# Patient Record
Sex: MALE | Race: WHITE | NOT HISPANIC OR LATINO | Employment: OTHER | URBAN - NONMETROPOLITAN AREA
[De-identification: names, ages, dates, MRNs, and addresses within clinical notes are randomized per-mention and may not be internally consistent; named-entity substitution may affect disease eponyms.]

---

## 2017-02-16 DIAGNOSIS — M25.50 ARTHRALGIA, UNSPECIFIED JOINT: Primary | ICD-10-CM

## 2017-02-16 RX ORDER — CELECOXIB 200 MG/1
200 CAPSULE ORAL DAILY
Qty: 60 CAPSULE | Refills: 3 | Status: SHIPPED | OUTPATIENT
Start: 2017-02-16 | End: 2017-09-03

## 2017-02-16 NOTE — TELEPHONE ENCOUNTER
celebrex Information provided by pharmacy-  Last Written Prescription Date:  1/10/17  Last Fill Quantity: 30,   # refills: 0  Last Office Visit with [facility], [facility] or [facility] prescribing provider: 2/25/15  Future Office visit:    Next 5 appointments (look out 90 days)     Mar 01, 2017  8:40 AM CST   Office Visit with Ryan Perera MD   [facility] ([facility]    [address] [facility] [id number] [phone]                   Routing refill request to provider for review/approval because:  Medication is reported/historical  Chelsie Maher MA     2/16/2017

## 2017-03-01 ENCOUNTER — OFFICE VISIT (OUTPATIENT)
Dept: FAMILY MEDICINE | Facility: OTHER | Age: 67
End: 2017-03-01
Payer: COMMERCIAL

## 2017-03-01 VITALS
HEIGHT: 72 IN | SYSTOLIC BLOOD PRESSURE: 126 MMHG | BODY MASS INDEX: 29.26 KG/M2 | RESPIRATION RATE: 20 BRPM | OXYGEN SATURATION: 98 % | TEMPERATURE: 97.6 F | HEART RATE: 60 BPM | WEIGHT: 216 LBS | DIASTOLIC BLOOD PRESSURE: 80 MMHG

## 2017-03-01 DIAGNOSIS — I10 HYPERTENSION GOAL BP (BLOOD PRESSURE) < 140/90: ICD-10-CM

## 2017-03-01 DIAGNOSIS — M25.551 HIP PAIN, RIGHT: Primary | ICD-10-CM

## 2017-03-01 DIAGNOSIS — M25.50 ARTHRALGIA, UNSPECIFIED JOINT: ICD-10-CM

## 2017-03-01 PROCEDURE — 99213 OFFICE O/P EST LOW 20 MIN: CPT | Performed by: FAMILY MEDICINE

## 2017-03-01 RX ORDER — ATENOLOL 25 MG/1
TABLET ORAL
Qty: 90 TABLET | Refills: 3 | Status: SHIPPED | OUTPATIENT
Start: 2017-03-01 | End: 2018-01-08

## 2017-03-01 ASSESSMENT — PAIN SCALES - GENERAL: PAINLEVEL: NO PAIN (0)

## 2017-03-01 NOTE — MR AVS SNAPSHOT
After Visit Summary   3/1/2017    Dav Seay    MRN: [id number]           Patient Information     Date Of Birth          1950        Visit Information        Provider Department      3/1/2017 8:40 AM Ryan Perera MD [facility]        Today's Diagnoses     Hip pain, right    -  1    Hypertension goal BP (blood pressure) < 140/90        Arthralgia, unspecified joint           Follow-ups after your visit        Additional Services     ORTHOPEDICS ADULT REFERRAL       Your provider has referred you to: [facility]: [facility] [phone]   [email or web address] Dr. Bah, Dr. Townsend, Dr. Alicia    Please be aware that coverage of these services is subject to the terms and limitations of your health insurance plan.  Call member services at your health plan with any benefit or coverage questions.      Please bring the following to your appointment:    >>   Any x-rays, CTs or MRIs which have been performed.  Contact the facility where they were done to arrange for  prior to your scheduled appointment.    >>   List of current medications   >>   This referral request   >>   Any documents/labs given to you for this referral                  Who to contact     If you have questions or need follow up information about today's clinic visit or your schedule please contact [facility] directly at [phone].  Normal or non-critical lab and imaging results will be communicated to you by MyChart, letter or phone within 4 business days after the clinic has received the results. If you do not hear from us within 7 days, please contact the clinic through MyChart or phone. If you have a critical or abnormal lab result, we will notify you by phone as soon as possible.  Submit refill requests through "[employer]" or call your pharmacy and they will forward the refill request to us. Please allow 3 business days for your refill to be  "completed.          Additional Information About Your Visit        [employer] Information     [employer] gives you secure access to your electronic health record. If you see a primary care provider, you can also send messages to your care team and make appointments. If you have questions, please call your primary care clinic.  If you do not have a primary care provider, please call [phone] and they will assist you.        Care EveryWhere ID     This is your Care EveryWhere ID. This could be used by other organizations to access your [facility] medical records  [id number]        Your Vitals Were     Pulse Temperature Respirations Height Pulse Oximetry BMI (Body Mass Index)    60 97.6  F (36.4  C) (Temporal) 20 5' 11.5\" (1.816 m) 98% 29.71 kg/m2       Blood Pressure from Last 3 Encounters:   03/01/17 126/80   02/25/15 136/88   03/25/14 132/78    Weight from Last 3 Encounters:   03/01/17 216 lb (98 kg)   02/25/15 214 lb 11.2 oz (97.4 kg)   03/25/14 223 lb 1.6 oz (101.2 kg)              We Performed the Following     ORTHOPEDICS ADULT REFERRAL          Today's Medication Changes          These changes are accurate as of: 3/1/17  9:03 AM.  If you have any questions, ask your nurse or doctor.               These medicines have changed or have updated prescriptions.        Dose/Directions    atenolol 25 MG tablet   Commonly known as:  TENORMIN   This may have changed:  See the new instructions.   Used for:  Hypertension goal BP (blood pressure) < 140/90   Changed by:  Ryan Perera MD        TAKE 1 TABLET BY MOUTH EVER Y DAY   Quantity:  90 tablet   Refills:  3            Where to get your medicines      These medications were sent to [id number] [place] - [address] [place] [address]  [address] [place], [place] [address] [address]    Hours:  M-F 8:30-6:30; Sat 9-4; closed Sunday Phone:  [phone]     atenolol 25 MG tablet                Primary Care Provider Office Phone # Fax #    Ryan Perera [phone]    "    [facility] [address] [facility] [address]        Thank you!     Thank you for choosing [facility]  for your care. Our goal is always to provide you with excellent care. Hearing back from our patients is one way we can continue to improve our services. Please take a few minutes to complete the written survey that you may receive in the mail after your visit with us. Thank you!             Your Updated Medication List - Protect others around you: Learn how to safely use, store and throw away your medicines at www.disposemymeds.org.          This list is accurate as of: 3/1/17  9:03 AM.  Always use your most recent med list.                   Brand Name Dispense Instructions for use    atenolol 25 MG tablet    TENORMIN    90 tablet    TAKE 1 TABLET BY MOUTH EVER Y DAY       celecoxib 200 MG capsule    celeBREX    60 capsule    Take 1 capsule (200 mg) by mouth daily       MELATONIN PO

## 2017-03-01 NOTE — PROGRESS NOTES
"Chief Complaint   Patient presents with     Refill Request     Recheck Medication       Initial /80 (BP Location: Right arm, Patient Position: Chair, Cuff Size: Adult Large)  Pulse 60  Temp 97.6  F (36.4  C) (Temporal)  Resp 20  Ht 5' 11.5\" (1.816 m)  Wt 216 lb (98 kg)  SpO2 98%  BMI 29.71 kg/m2 Estimated body mass index is 29.71 kg/(m^2) as calculated from the following:    Height as of this encounter: 5' 11.5\" (1.816 m).    Weight as of this encounter: 216 lb (98 kg).  Medication Reconciliation: complete     Batsheva Degroot CMA      "

## 2017-03-01 NOTE — PROGRESS NOTES
SUBJECTIVE:                                                    Dav Seay is a 66 year old male who presents to clinic today for the following health issues:      Medication Followup of Celebrex    Taking Medication as prescribed: yes 200 mg once per day    Side Effects:  None    Medication Helping Symptoms:  yes       Hypertension Follow-up      Outpatient blood pressures are not being checked.    Low Salt Diet: no added salt       Problem list and histories reviewed & adjusted, as indicated.  Additional history: He continues to take Celebrex daily for right hip pain. It controls his pain well and his pain increases if he forgets to take it. He did have Left SIOBHAN many years ago with [facility]. His surgeon has retired and he may be interested in having Right SIOBHAN before end of 2017.    Patient Active Problem List   Diagnosis     CARDIOVASCULAR SCREENING; LDL GOAL LESS THAN 160     Hypertension goal BP (blood pressure) < 140/90     Advance Care Planning     Insomnia, transient     Past Surgical History   Procedure Laterality Date     Hc removal of tonsils,<13 y/o  5 years old     Tonsils <14y.o.     Hc colonoscopy thru stoma w biopsy/cautery tumor/polyp/lesion  2003     hyperplastic polyp repeat in 2008.     Hernia repair, inguinal rt/lt       Joint replacement, hip rt/lt  12/19/11     Left total hip arthroplasty. [facility].     Colonoscopy  6/10/2013     Procedure: COLONOSCOPY;  Colonoscopy;  Surgeon: Wes Pablo MD;  Location:  GI       Social History   Substance Use Topics     Smoking status: Never Smoker     Smokeless tobacco: Never Used     Alcohol use Yes      Comment: occasional     Family History   Problem Relation Age of Onset     DIABETES Father      CANCER Father      skin, lymph and colon CA     HEART DISEASE Mother      Tachycardia     OSTEOPOROSIS Mother      Arthritis Brother      Anesthesia Reaction No family hx of          Current Outpatient Prescriptions   Medication Sig  "Dispense Refill     atenolol (TENORMIN) 25 MG tablet TAKE 1 TABLET BY MOUTH EVER Y DAY 90 tablet 3     celecoxib (CELEBREX) 200 MG capsule Take 1 capsule (200 mg) by mouth daily 60 capsule 3     MELATONIN PO        [DISCONTINUED] atenolol (TENORMIN) 25 MG tablet TAKE 1 TABLET BY MOUTH EVER Y DAY 90 tablet 2     [DISCONTINUED] Celecoxib (CELEBREX PO) Take  by mouth. 1 daily- not sure of dose       Allergies   Allergen Reactions     No Known Drug Allergies      Recent Labs   Lab Test 03/21/16  02/25/15   0827  12/14/11   0910  08/03/10   0807   02/07/09   0800   LDL  100.0  100   --   130*   < >   --    HDL  49  49   --   44   < >   --    TRIG  75  94   --   116   < >   --    ALT  31   --    --    --    --    --    CR  1.3  1.21  1.07  1.28*   --   1.09   GFRESTIMATED  56  61  71  58*   --   70   GFRESTBLACK   --   73  86  70   --   85   POTASSIUM  4.9  4.5  4.6  4.3   --   4.2   TSH   --    --    --    --    --   1.70    < > = values in this interval not displayed.      BP Readings from Last 3 Encounters:   03/01/17 126/80   02/25/15 136/88   03/25/14 132/78    Wt Readings from Last 3 Encounters:   03/01/17 216 lb (98 kg)   02/25/15 214 lb 11.2 oz (97.4 kg)   03/25/14 223 lb 1.6 oz (101.2 kg)                    Reviewed and updated as needed this visit by clinical staff  Tobacco  Allergies       Reviewed and updated as needed this visit by Provider         ROS:  C: NEGATIVE for fever, chills, change in weight  E/M: NEGATIVE for ear, mouth and throat problems  R: NEGATIVE for significant cough or SOB  CV: NEGATIVE for chest pain, palpitations or peripheral edema  MUSCULOSKELETAL: POSITIVE  for joint pain right hip  ROS otherwise negative    OBJECTIVE:                                                    /80 (BP Location: Right arm, Patient Position: Chair, Cuff Size: Adult Large)  Pulse 60  Temp 97.6  F (36.4  C) (Temporal)  Resp 20  Ht 5' 11.5\" (1.816 m)  Wt 216 lb (98 kg)  SpO2 98%  BMI 29.71 kg/m2  Body mass " index is 29.71 kg/(m^2).  GENERAL: healthy, alert and no distress  NECK: no adenopathy, no asymmetry, masses, or scars and thyroid normal to palpation  RESP: lungs clear to auscultation - no rales, rhonchi or wheezes  CV: regular rate and rhythm, normal S1 S2, no S3 or S4, no murmur, click or rub, no peripheral edema and peripheral pulses strong  ABDOMEN: soft, nontender, no hepatosplenomegaly, no masses and bowel sounds normal  MS: RLE exam shows normal strength and muscle mass, no deformities and EXTREMITIES: No palpable pain. ROM limited in right  hip with external rotation. No lateral/gluteal pain. Good pulses. No edema. Normal reflexes.      PELVIS AND BILATERAL HIPS 2009      CLINICAL HISTORY:  Back pain; Hip pain       FINDINGS:  AP view of the pelvis and lateral view of both hips was   obtained.  There is mild degenerative changes in the right hip and   moderate degenerative changes in the left hip. There is no acute   fracture identified or dislocation. There is evidence of previous   hernia repair in the pelvis.         IMPRESSION: Mild degenerative changes right hip and moderate   degenerative changes left hip.  No acute bone abnormalities seen.    Diagnostic Test Results:  none      ASSESSMENT/PLAN:                                                      1. Hypertension goal BP (blood pressure) < 140/90  Chronic controlled. The current medical regimen is effective;  continue present plan and medications.  - atenolol (TENORMIN) 25 MG tablet; TAKE 1 TABLET BY MOUTH EVER Y DAY  Dispense: 90 tablet; Refill: 3    2. Arthralgia, unspecified joint  Chronic degenerative joint of right hip. Continues to be symptomatic but manageable with Celebrex    3. Hip pain, right  Chronic degenerative joint of right hip. Continues to be symptomatic but manageable with Celebrex. Will refer to PT for opinion and possible surgery prior to end of 2017.  - ORTHOPEDICS ADULT REFERRAL    SELF MONITORING:       - Please check blood  pressure readings several times per week  Work on weight loss  Regular exercise    Ryan Perera MD  [facility]

## 2017-03-16 ENCOUNTER — TRANSFERRED RECORDS (OUTPATIENT)
Dept: HEALTH INFORMATION MANAGEMENT | Facility: CLINIC | Age: 67
End: 2017-03-16

## 2017-03-16 LAB
ALT SERPL-CCNC: 41 U/L (ref 0–65)
AST SERPL-CCNC: 29 U/L (ref 5–40)
CREAT SERPL-MCNC: 1.3 MG/DL (ref 0.5–1.5)
GFR SERPL CREATININE-BSD FRML MDRD: 56 ML/MIN/1.73M2
GLUCOSE SERPL-MCNC: 103 MG/DL (ref 70–100)
POTASSIUM SERPL-SCNC: 5.1 MMOL/L (ref 3.5–5)

## 2017-04-08 ENCOUNTER — APPOINTMENT (OUTPATIENT)
Dept: CT IMAGING | Facility: CLINIC | Age: 67
End: 2017-04-08
Attending: FAMILY MEDICINE
Payer: MEDICARE

## 2017-04-08 ENCOUNTER — HOSPITAL ENCOUNTER (EMERGENCY)
Facility: CLINIC | Age: 67
Discharge: HOME | End: 2017-04-08
Attending: FAMILY MEDICINE | Admitting: FAMILY MEDICINE
Payer: MEDICARE

## 2017-04-08 VITALS
TEMPERATURE: 98 F | HEIGHT: 72 IN | SYSTOLIC BLOOD PRESSURE: 148 MMHG | RESPIRATION RATE: 16 BRPM | BODY MASS INDEX: 28.44 KG/M2 | WEIGHT: 210 LBS | OXYGEN SATURATION: 98 % | DIASTOLIC BLOOD PRESSURE: 72 MMHG | HEART RATE: 74 BPM

## 2017-04-08 DIAGNOSIS — M25.551 RIGHT HIP PAIN: ICD-10-CM

## 2017-04-08 DIAGNOSIS — S39.012A STRAIN OF LUMBAR REGION, INITIAL ENCOUNTER: ICD-10-CM
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
## 2017-04-08 DIAGNOSIS — S33.5XXA SPRAIN OF LUMBAR REGION, INITIAL ENCOUNTER: ICD-10-CM

## 2017-04-08 PROCEDURE — 72192 CT PELVIS W/O DYE: CPT

## 2017-04-08 PROCEDURE — 96374 THER/PROPH/DIAG INJ IV PUSH: CPT

## 2017-04-08 PROCEDURE — 99285 EMERGENCY DEPT VISIT HI MDM: CPT | Mod: Z6 | Performed by: FAMILY MEDICINE

## 2017-04-08 PROCEDURE — 99285 EMERGENCY DEPT VISIT HI MDM: CPT | Mod: 25

## 2017-04-08 PROCEDURE — 72131 CT LUMBAR SPINE W/O DYE: CPT

## 2017-04-08 PROCEDURE — 25000132 ZZH RX MED GY IP 250 OP 250 PS 637: Performed by: FAMILY MEDICINE

## 2017-04-08 PROCEDURE — 96372 THER/PROPH/DIAG INJ SC/IM: CPT | Mod: XS

## 2017-04-08 PROCEDURE — 25000128 H RX IP 250 OP 636: Performed by: FAMILY MEDICINE

## 2017-04-08 RX ORDER — OXYCODONE AND ACETAMINOPHEN 5; 325 MG/1; MG/1
1-2 TABLET ORAL EVERY 4 HOURS PRN
Qty: 30 TABLET | Refills: 0 | Status: SHIPPED | OUTPATIENT
Start: 2017-04-08 | End: 2017-06-05

## 2017-04-08 RX ORDER — LIDOCAINE 40 MG/G
CREAM TOPICAL
Status: DISCONTINUED | OUTPATIENT
Start: 2017-04-08 | End: 2017-04-08 | Stop reason: HOSPADM

## 2017-04-08 RX ORDER — CYCLOBENZAPRINE HCL 5 MG
5 TABLET ORAL 3 TIMES DAILY PRN
Qty: 20 TABLET | Refills: 0 | Status: SHIPPED | OUTPATIENT
Start: 2017-04-08 | End: 2017-04-14

## 2017-04-08 RX ORDER — KETOROLAC TROMETHAMINE 15 MG/ML
30 INJECTION, SOLUTION INTRAMUSCULAR; INTRAVENOUS ONCE
Status: COMPLETED | OUTPATIENT
Start: 2017-04-08 | End: 2017-04-08

## 2017-04-08 RX ORDER — OXYCODONE AND ACETAMINOPHEN 5; 325 MG/1; MG/1
1-2 TABLET ORAL ONCE
Status: COMPLETED | OUTPATIENT
Start: 2017-04-08 | End: 2017-04-08

## 2017-04-08 RX ORDER — HYDROMORPHONE HYDROCHLORIDE 1 MG/ML
0.5 INJECTION, SOLUTION INTRAMUSCULAR; INTRAVENOUS; SUBCUTANEOUS
Status: COMPLETED | OUTPATIENT
Start: 2017-04-08 | End: 2017-04-08

## 2017-04-08 RX ADMIN — HYDROMORPHONE HYDROCHLORIDE 0.5 MG: 1 INJECTION, SOLUTION INTRAMUSCULAR; INTRAVENOUS; SUBCUTANEOUS at 17:12

## 2017-04-08 RX ADMIN — KETOROLAC TROMETHAMINE 30 MG: 15 INJECTION, SOLUTION INTRAMUSCULAR; INTRAVENOUS at 16:49

## 2017-04-08 RX ADMIN — OXYCODONE HYDROCHLORIDE AND ACETAMINOPHEN 1 TABLET: 5; 325 TABLET ORAL at 16:00

## 2017-04-08 ASSESSMENT — ENCOUNTER SYMPTOMS
NECK PAIN: 0
HEADACHES: 0
NUMBNESS: 0
ARTHRALGIAS: 1
BACK PAIN: 1
SHORTNESS OF BREATH: 0

## 2017-04-08 NOTE — ED AVS SNAPSHOT
[facility]    [address] [place] [address]    Phone:  [phone]    Fax:  [phone]                                       Dav Seay   MRN: [id number]    Department:  [facility]   Date of Visit:  4/8/2017           After Visit Summary Signature Page     I have received my discharge instructions, and my questions have been answered. I have discussed any challenges I see with this plan with the nurse or doctor.    ..........................................................................................................................................  Patient/Patient Representative Signature      ..........................................................................................................................................  Patient Representative Print Name and Relationship to Patient    ..................................................               ................................................  Date                                            Time    ..........................................................................................................................................  Reviewed by Signature/Title    ...................................................              ..............................................  Date                                                            Time

## 2017-04-08 NOTE — ED AVS SNAPSHOT
[facility]    [address]     [place] [address]    Phone:  [phone]    Fax:  [phone]                                       Dav Seay   MRN: [id number]    Department:  [facility]   Date of Visit:  4/8/2017           Patient Information     Date Of Birth          1950        Your diagnoses for this visit were:     Sprain of lumbar region, initial encounter        You were seen by Graham Will MD.      Follow-up Information     Follow up with Ryan Perera MD. Schedule an appointment as soon as possible for a visit in 3 days.    Specialty:  Family Practice    Why:  If not improving.    Contact information:    [facility]  [address] [facility] [address]  [phone]          Discharge Instructions         Back Sprain or Strain    Injury to the muscles (strain) or ligaments (sprain) around the spine can be troubling. Injury may occur after a sudden forceful twisting or bending force such as in a car accident, after a simple awkward movement, or after lifting something heavy with poor body positioning. In any case, muscle spasm is often present and adds to the pain.  Thankfully, most people feel better in 1 to 2 weeks, and most of the rest in 1 to 2 months. Most people can remain active. Unless you had a forceful physical injury such as a car accident or fall, X-rays are usually not ordered for the first evaluation of a back sprain or strain. If pain continues and does not respond to medical treatment, your healthcare provider may order X-rays and other tests.  Home care  The following guidelines will help you care for your injury at home:    When in bed, try to find a comfortable position. A firm mattress is best. Try lying flat on your back with pillows under your knees. You can also try lying on your side with your knees bent up toward your chest and a pillow between your knees.    Don't sit for long  periods. Try not to take long car rides or take other trips that have you sitting for a long time. This puts more stress on the lower back than standing or walking.    During the first 24 to 72 hours after an injury or flare-up, apply an ice pack to the painful area for 20 minutes. Then remove it for 20 minutes. Do this for 60 to 90 minutes, or several times a day, This will reduce swelling and pain. Be sure to wrap the ice pack in a thin towel or plastic to protect your skin.    You can start with ice, then switch to heat. Heat from a hot shower, hot bath, or heating pad reduces swelling and pain and works well for muscle spasms. Put heat on the painful area for 20 minutes, then remove for 20 minutes. Do this for 60 to 90 minutes, or several times a day. Do not use a heating pad while sleeping. It can burn the skin.    You can alternate the ice and heat. Talk with your healthcare provider to find out the best treatment or therapy for your back pain.    Therapeutic massage will help relax the back muscles without stretching them.    Be aware of safe lifting methods. Do not lift anything over 15 pounds until all of the pain is gone.  Medicines  Talk to your healthcare provider before using medicines, especially if you have other health problems or are taking other medicines.    You may use acetaminophen or ibuprofen to control pain, unless another pain medicine was prescribed. If you have chronic conditions like diabetes, liver or kidney disease, stomach ulcers, or gastrointestinal bleeding, or are taking blood-thinner medicines, talk with your doctor before taking any medicines.    Be careful if you are given prescription medicines, narcotics, or medicine for muscle spasm. They can cause drowsiness, and affect your coordination, reflexes, and judgment. Do not drive or operate heavy machinery.  Follow-up care  Follow up with your healthcare provider or this facility as advised. You may need physical therapy or more  tests if your symptoms get worse.  If you had X-rays today, they didn t show any broken bones, breaks, or fractures. Sometimes fractures don t show up on the first X-ray. Bruises and sprains can sometimes hurt as much as a fracture. These injuries can take time to heal completely. If your symptoms don t improve or they get worse, talk with your healthcare provider. You may need a repeat X-ray.  Call 911  Call for emergency care if any of the following occur:    Trouble breathing    Confused    Very drowsy or trouble awakening    Fainting or loss of consciousness    Rapid or very slow heart rate    Loss of bowel or bladder control  When to seek medical advice  Call your healthcare provider right away if any of the following occur:    Pain gets worse or spreads to your arms or legs    Weakness or numbness in one or both arms or legs    Numbness in the groin or genital area    6658-9332 [employer]. [address] [place], [place] [address]. All rights reserved. This information is not intended as a substitute for professional medical care. Always follow your healthcare professional's instructions.          Relieving Back Pain  Back pain is a common problem. You can strain back muscles by lifting too much weight or just by moving the wrong way. Back strain can be uncomfortable, even painful. And it can take weeks or months to improve. To help yourself feel better and prevent future back strains, try these tips.  Important Note: Do not give aspirin to children or teens without first discussing it with your healthcare provider.     Ice    Ice reduces muscle pain and swelling. It helps most during the first 24 to 48 hours after an injury.    Wrap an ice pack or a bag of frozen peas in a thin towel. (Never place ice directly on your skin.)    Place the ice where your back hurts the most.    Don t ice for more than 20 minutes at a time.    You can use ice several times a day.     Medicines  Over-the-counter  pain relievers can include acetaminophen and anti-inflammatory medicines, which includes aspirin or ibuprofen. They can help ease discomfort. Some also reduce swelling.    Tell your healthcare provider about any medicines you are already taking.    Take medicines only as directed.    Heat  After the first 48 hours, heat can relax sore muscles and improve blood flow.    Try a warm bath or shower. Or use a heating pad set on low. To prevent a burn, keep a cloth between you and the heating pad.    Don t use a heating pad for more than 15 minutes at a time. Never sleep on a heating pad.    0464-0578 [employer]. [address] [place], [place] [address]. All rights reserved. This information is not intended as a substitute for professional medical care. Always follow your healthcare professional's instructions.          24 Hour Appointment Hotline       To make an appointment at any [facility], call [phone] ([phone]). If you don't have a family doctor or clinic, we will help you find one. [facility] are conveniently located to serve the needs of you and your family.             Review of your medicines      START taking        Dose / Directions Last dose taken    cyclobenzaprine 5 MG tablet   Commonly known as:  FLEXERIL   Dose:  5 mg   Quantity:  20 tablet        Take 1 tablet (5 mg) by mouth 3 times daily as needed for muscle spasms   Refills:  0        oxyCODONE-acetaminophen 5-325 MG per tablet   Commonly known as:  PERCOCET   Dose:  1-2 tablet   Quantity:  30 tablet        Take 1-2 tablets by mouth every 4 hours as needed for pain   Refills:  0          Our records show that you are taking the medicines listed below. If these are incorrect, please call your family doctor or clinic.        Dose / Directions Last dose taken    atenolol 25 MG tablet   Commonly known as:  TENORMIN   Quantity:  90 tablet        TAKE 1 TABLET BY MOUTH EVER Y DAY   Refills:  3        celecoxib 200 MG  capsule   Commonly known as:  celeBREX   Dose:  200 mg   Quantity:  60 capsule        Take 1 capsule (200 mg) by mouth daily   Refills:  3        MELATONIN PO        Refills:  0                Prescriptions were sent or printed at these locations (2 Prescriptions)                   [facility] [place], [place] - [address]   [address], [place] [address]    Telephone:  [phone]   Fax:  [phone]   Hours:                  Printed at Department/Unit printer (2 of 2)         oxyCODONE-acetaminophen (PERCOCET) 5-325 MG per tablet               cyclobenzaprine (FLEXERIL) 5 MG tablet                Procedures and tests performed during your visit     CT Pelvis w/o Contrast    Lumbar spine CT w/o contrast      Orders Needing Specimen Collection     None      Pending Results     Date and Time Order Name Status Description    4/8/2017 1552 CT Pelvis w/o Contrast Preliminary     4/8/2017 1552 Lumbar spine CT w/o contrast Preliminary             Pending Culture Results     No orders found from 4/6/2017 to 4/9/2017.            Thank you for choosing [facility]       Thank you for choosing [facility] for your care. Our goal is always to provide you with excellent care. Hearing back from our patients is one way we can continue to improve our services. Please take a few minutes to complete the written survey that you may receive in the mail after you visit with us. Thank you!        [employer] Information     [employer] gives you secure access to your electronic health record. If you see a primary care provider, you can also send messages to your care team and make appointments. If you have questions, please call your primary care clinic.  If you do not have a primary care provider, please call [phone] and they will assist you.        Care EveryWhere ID     This is your Care EveryWhere ID. This could be used by other organizations to access your [facility] medical records  [id number]        After  Visit Summary       This is your record. Keep this with you and show to your community pharmacist(s) and doctor(s) at your next visit.

## 2017-04-08 NOTE — DISCHARGE INSTRUCTIONS
Back Sprain or Strain    Injury to the muscles (strain) or ligaments (sprain) around the spine can be troubling. Injury may occur after a sudden forceful twisting or bending force such as in a car accident, after a simple awkward movement, or after lifting something heavy with poor body positioning. In any case, muscle spasm is often present and adds to the pain.  Thankfully, most people feel better in 1 to 2 weeks, and most of the rest in 1 to 2 months. Most people can remain active. Unless you had a forceful physical injury such as a car accident or fall, X-rays are usually not ordered for the first evaluation of a back sprain or strain. If pain continues and does not respond to medical treatment, your healthcare provider may order X-rays and other tests.  Home care  The following guidelines will help you care for your injury at home:    When in bed, try to find a comfortable position. A firm mattress is best. Try lying flat on your back with pillows under your knees. You can also try lying on your side with your knees bent up toward your chest and a pillow between your knees.    Don't sit for long periods. Try not to take long car rides or take other trips that have you sitting for a long time. This puts more stress on the lower back than standing or walking.    During the first 24 to 72 hours after an injury or flare-up, apply an ice pack to the painful area for 20 minutes. Then remove it for 20 minutes. Do this for 60 to 90 minutes, or several times a day, This will reduce swelling and pain. Be sure to wrap the ice pack in a thin towel or plastic to protect your skin.    You can start with ice, then switch to heat. Heat from a hot shower, hot bath, or heating pad reduces swelling and pain and works well for muscle spasms. Put heat on the painful area for 20 minutes, then remove for 20 minutes. Do this for 60 to 90 minutes, or several times a day. Do not use a heating pad while sleeping. It can burn the  skin.    You can alternate the ice and heat. Talk with your healthcare provider to find out the best treatment or therapy for your back pain.    Therapeutic massage will help relax the back muscles without stretching them.    Be aware of safe lifting methods. Do not lift anything over 15 pounds until all of the pain is gone.  Medicines  Talk to your healthcare provider before using medicines, especially if you have other health problems or are taking other medicines.    You may use acetaminophen or ibuprofen to control pain, unless another pain medicine was prescribed. If you have chronic conditions like diabetes, liver or kidney disease, stomach ulcers, or gastrointestinal bleeding, or are taking blood-thinner medicines, talk with your doctor before taking any medicines.    Be careful if you are given prescription medicines, narcotics, or medicine for muscle spasm. They can cause drowsiness, and affect your coordination, reflexes, and judgment. Do not drive or operate heavy machinery.  Follow-up care  Follow up with your healthcare provider or this facility as advised. You may need physical therapy or more tests if your symptoms get worse.  If you had X-rays today, they didn t show any broken bones, breaks, or fractures. Sometimes fractures don t show up on the first X-ray. Bruises and sprains can sometimes hurt as much as a fracture. These injuries can take time to heal completely. If your symptoms don t improve or they get worse, talk with your healthcare provider. You may need a repeat X-ray.  Call 911  Call for emergency care if any of the following occur:    Trouble breathing    Confused    Very drowsy or trouble awakening    Fainting or loss of consciousness    Rapid or very slow heart rate    Loss of bowel or bladder control  When to seek medical advice  Call your healthcare provider right away if any of the following occur:    Pain gets worse or spreads to your arms or legs    Weakness or numbness in one or  both arms or legs    Numbness in the groin or genital area    8568-8443 [employer]. [address] [place] [address]. All rights reserved. This information is not intended as a substitute for professional medical care. Always follow your healthcare professional's instructions.          Relieving Back Pain  Back pain is a common problem. You can strain back muscles by lifting too much weight or just by moving the wrong way. Back strain can be uncomfortable, even painful. And it can take weeks or months to improve. To help yourself feel better and prevent future back strains, try these tips.  Important Note: Do not give aspirin to children or teens without first discussing it with your healthcare provider.     Ice    Ice reduces muscle pain and swelling. It helps most during the first 24 to 48 hours after an injury.    Wrap an ice pack or a bag of frozen peas in a thin towel. (Never place ice directly on your skin.)    Place the ice where your back hurts the most.    Don t ice for more than 20 minutes at a time.    You can use ice several times a day.     Medicines  Over-the-counter pain relievers can include acetaminophen and anti-inflammatory medicines, which includes aspirin or ibuprofen. They can help ease discomfort. Some also reduce swelling.    Tell your healthcare provider about any medicines you are already taking.    Take medicines only as directed.    Heat  After the first 48 hours, heat can relax sore muscles and improve blood flow.    Try a warm bath or shower. Or use a heating pad set on low. To prevent a burn, keep a cloth between you and the heating pad.    Don t use a heating pad for more than 15 minutes at a time. Never sleep on a heating pad.    2531-2669 [employer]. [address] [place] [id number]. All rights reserved. This information is not intended as a substitute for professional medical care. Always follow your healthcare professional's  instructions.

## 2017-04-08 NOTE — ED PROVIDER NOTES
"  History     Chief Complaint   Patient presents with     Fall     Fall from tree approx 5 feet. Right hip and back pain.      The history is provided by the patient.     Dav Seay is a 67 year old male who presents to the emergency department with right hip pain. Around 1500 today, the patient was 5 feet on a ladder leaning against a tree when he lost balance, falling, and landing on his back. He complains of right hip and right sided low back pain. Patient is able to bear \"some weight\" on his right leg but not without significant pain. He denies headache, numbness, tingling, neck pain, shortness of breath, or loss of consciousness. He says he hit his head \"a little\". Patient notes chronic issues with his right hip. He says he needs a right hip replacement. He has never received medications or injections for this chronic hip pain.     I have reviewed the Medications, Allergies, Past Medical and Surgical History, and Social History in the Epic system.    Patient Active Problem List   Diagnosis     CARDIOVASCULAR SCREENING; LDL GOAL LESS THAN 160     Hypertension goal BP (blood pressure) < 140/90     Advance Care Planning     Insomnia, transient     Past Medical History:   Diagnosis Date     Traumatic amputation of other finger(s) (complete) (partial), without mention of complication 1975    Amputated RIght 2nd finger       Past Surgical History:   Procedure Laterality Date     COLONOSCOPY  6/10/2013    Procedure: COLONOSCOPY;  Colonoscopy;  Surgeon: Wes Pablo MD;  Location:  GI      COLONOSCOPY THRU STOMA W BIOPSY/CAUTERY TUMOR/POLYP/LESION  2003    hyperplastic polyp repeat in 2008.     HC REMOVAL OF TONSILS,<15 Y/O  5 years old    Tonsils <14y.o.     HERNIA REPAIR, INGUINAL RT/LT       JOINT REPLACEMENT, HIP RT/LT  12/19/11    Left total hip arthroplasty. [facility].       Family History   Problem Relation Age of Onset     DIABETES Father      CANCER Father      skin, lymph and " colon CA     HEART DISEASE Mother      Tachycardia     OSTEOPOROSIS Mother      Arthritis Brother      Anesthesia Reaction No family hx of        Social History   Substance Use Topics     Smoking status: Never Smoker     Smokeless tobacco: Never Used     Alcohol use Yes      Comment: occasional        Immunization History   Administered Date(s) Administered     Hepatitis A Vac Ped/Adol-2 Dose 10/17/2012     Hepatitis B 10/17/2012     Influenza (IIV3) 11/19/2008, 10/19/2011, 10/17/2012, 09/11/2013     TD (ADULT, 7+) 12/18/2000     TDAP Vaccine (Adacel) 08/02/2010     Zoster vaccine, live 09/11/2013          Allergies   Allergen Reactions     No Known Drug Allergies        Current Outpatient Prescriptions   Medication Sig Dispense Refill     atenolol (TENORMIN) 25 MG tablet TAKE 1 TABLET BY MOUTH EVER Y DAY 90 tablet 3     celecoxib (CELEBREX) 200 MG capsule Take 1 capsule (200 mg) by mouth daily 60 capsule 3     MELATONIN PO        Review of Systems   Respiratory: Negative for shortness of breath.    Musculoskeletal: Positive for arthralgias (right hip) and back pain (right low). Negative for neck pain.   Neurological: Negative for numbness and headaches.   All other systems reviewed and are negative.      Physical Exam   BP: 150/88  Pulse: 68  Temp: 98  F (36.7  C)  Resp: 20  Height: 182.9 cm (6')  Weight: 95.3 kg (210 lb)  SpO2: 96 %    Physical Exam   Constitutional: He is oriented to person, place, and time. He appears well-developed and well-nourished.   HENT:   Head: Normocephalic and atraumatic.   Mouth/Throat: Oropharynx is clear and moist.   Eyes: Conjunctivae and EOM are normal. Pupils are equal, round, and reactive to light.   Neck: Normal range of motion. Neck supple. No spinous process tenderness and no muscular tenderness present.   Cardiovascular: Normal rate, regular rhythm, normal heart sounds and intact distal pulses.    Pulmonary/Chest: Effort normal and breath sounds normal. He has no wheezes. He  has no rhonchi. He has no rales.   Abdominal: Soft. Bowel sounds are normal. There is no tenderness.   Musculoskeletal:        Cervical back: He exhibits no tenderness.        Lumbar back: He exhibits decreased range of motion, tenderness, bony tenderness, pain and spasm. He exhibits no swelling, no edema, no deformity, no laceration and normal pulse.        Back:    Lumbar region: Midline and right paraspinal muscle tenderness.  Right hip: pain with internal and external rotation.   Neurological: He is alert and oriented to person, place, and time.   Skin: Skin is warm and dry.   Psychiatric: He has a normal mood and affect. His behavior is normal.   Nursing note and vitals reviewed.      ED Course     ED Course     Procedures         Results for orders placed or performed during the hospital encounter of 04/08/17   Lumbar spine CT w/o contrast    Narrative    CT LUMBAR SPINE WITHOUT CONTRAST 4/8/2017 4:39 PM    HISTORY: Fall, right-sided back pain, right pelvic pain.    COMPARISON: None.    TECHNIQUE: CT lumbar spine T12-S1. Multiplanar reconstruction.  Radiation dose for this scan was reduced using automated exposure  control, adjustment of the mA and/or kV according to patient size, or  iterative reconstruction technique.    FINDINGS: Grade 1 spondylolisthesis of L4 on L5 due to bilateral  spondylolysis at this level. Advanced degenerative narrowing of the  L4-L5 interspace with vacuum sign of degenerative disc disease and  advanced narrowing of the lumbosacral interspace. No fracture or acute  appearing abnormality in the lumbar spine or visualized upper sacrum.  No paraspinous soft tissue abnormality. Findings by level as follows:    L1-L2: Facet joint disease, mild.    L2-L3: No central or lateral stenosis. Moderate bilateral facet joint  disease.    L3-L4: Minimal annular bulge. Minimal central stenosis. No significant  foraminal stenosis. Moderate facet joint disease.    L4-L5: Degenerative changes  discussed above including spondylitic  spondylolisthesis and advanced degenerative disease. Moderate to  severe central stenosis. Moderate up down foraminal stenosis  bilaterally.    L5-S1: Narrowing of the interspace. No central stenosis. Moderate  bilateral foraminal stenosis. Minimal facet joint disease.      Impression    IMPRESSION:  1. No fracture or acute abnormality.  2. Multilevel degenerative change as described.  3. Grade I spondylolisthesis of L4 on L5 due to bilateral  spondylolysis.  4. Spinal stenosis as described most marked at L4-L5 centrally and  laterally.   CT Pelvis w/o Contrast    Narrative    CT PELVIS WITHOUT CONTRAST   4/8/2017 4:40 PM     HISTORY: Right pelvic and right hip pain, fall out of tree.    TECHNIQUE: CT bony pelvis without IV contrast. Transverse, sagittal  and coronal reformatted images were obtained from the source data.  Radiation dose for this scan was reduced using automated exposure  control, adjustment of the mA and/or kV according to patient size, or  iterative reconstruction technique.    COMPARISON: Pelvis and right hip x-rays dated 2/11/2009.    FINDINGS: Bilateral chronic L4 spondylolysis is noted with minimal  (0.5 cm) anterolisthesis of L4 and L5. Severe degenerative changes are  seen in the lumbar spine at L4-L5 and L5-S1 with partial fusion of L5  and S1 vertebral bodies. Lucency is seen through the inferior and  posterior osteophytes around the right acetabulum which could  represent fractured osteophytes but this could also be a chronic  finding. No acute fracture or malalignment is otherwise identified.  Left hip arthroplasty is new since the prior plain films from 2009.  This causes streak artifact mildly limiting evaluation of pelvis.  Hardware appears to be intact and well seated. No obvious evidence for  hardware failure or loosening is seen.    There are severe degenerative changes of the right hip with near  complete joint space loss and large osteophyte  formation. No acute  fracture of the hip is identified.    Partial fusion superior aspect of the right SI joint due to large  osteophytes are noted. This is consistent with degenerative change.    Small fat-containing bilateral inguinal hernias are noted. There are a  few scattered colonic diverticuli. Some atherosclerotic calcifications  are noted. Hernia fixation devices are seen in the anterior pelvic  wall. Visualized intrapelvic structures are otherwise unremarkable.      Impression    IMPRESSION:  1. Lucency is seen through the posterior and inferior osteophytes  around the right acetabulum which could represent a fractured  osteophyte. This is more likely a chronic finding. Otherwise, no acute  fracture or malalignment is identified.  2. Chronic bilateral L4 spondylolysis with minimal anterolisthesis of  L4 and L5.  3. Severe degenerative changes of the right hip and of the lower  lumbar spine. Degenerative changes of the right SI joint with partial  fusion are also noted.  4. Mild colonic diverticulosis is partially imaged. Small  fat-containing bilateral inguinal hernias.  5. Left hip arthroplasty causes streak artifact mildly limiting  evaluation of pelvis. No obvious abnormality of the left hip  arthroplasty is identified.       Medications   lidocaine 1 % 1 mL (not administered)   lidocaine (LMX4) kit (not administered)   sodium chloride (PF) 0.9% PF flush 3 mL (not administered)   sodium chloride (PF) 0.9% PF flush 3 mL (not administered)   ketorolac (TORADOL) injection 30 mg (30 mg Intramuscular Given 4/8/17 1649)   oxyCODONE-acetaminophen (PERCOCET) 5-325 MG per tablet 1-2 tablet (1 tablet Oral Given 4/8/17 1600)   HYDROmorphone (PF) (DILAUDID) injection 0.5 mg (0.5 mg Intravenous Given 4/8/17 1712)     Dav is a 67 year old male who presents to the ED with right hip and right low back pain after falling off a ladder from 5 feet in the air. Upon arrival to the ED, the patient had a blood pressure of  150/88 but was otherwise vitally stable. On exam, he exhibits pain with internal and external rotation of his hip. He also has midline lumber and right paraspinal tenderness. The patient was given Toradol and oxycodone.  With some improvement.  A  CT of his right pelvis and lumbar spine were ordered.  This is negative for any obvious fracture.  Patient did have significant amount of arthritis noted in this hip and back.  It seems like this fall mostly is causing muscle spasms in his back.  Recommend conservative care.  Will discharge him home with a prescriptive for Percocet and Flexeril.  We'll have a follow-up with his doctor on Monday if there is no improvement in the symptoms..        Assessments & Plan (with Medical Decision Making)  Lumbar muscle strain     I have reviewed the nursing notes.    I have reviewed the findings, diagnosis, plan and need for follow up with the patient.    This document serves as a record of services personally performed by Graham Will MD. It was created on their behalf by Lidia Cook, a trained medical scribe. The creation of this record is based on the provider's personal observations and the statements of the patient. This document has been checked and approved by the attending provider.     Note: Chart documentation done in part with Dragon Voice Recognition software. Although reviewed after completion, some word and grammatical errors may remain.    4/8/2017   [facility] EMERGENCY DEPARTMENT     Graham Will MD  04/08/17 [id number]

## 2017-06-05 ENCOUNTER — OFFICE VISIT (OUTPATIENT)
Dept: FAMILY MEDICINE | Facility: OTHER | Age: 67
End: 2017-06-05
Payer: COMMERCIAL

## 2017-06-05 VITALS
HEART RATE: 63 BPM | TEMPERATURE: 97.8 F | DIASTOLIC BLOOD PRESSURE: 80 MMHG | BODY MASS INDEX: 28.7 KG/M2 | WEIGHT: 211.6 LBS | OXYGEN SATURATION: 95 % | RESPIRATION RATE: 18 BRPM | SYSTOLIC BLOOD PRESSURE: 130 MMHG

## 2017-06-05 DIAGNOSIS — G56.02 CARPAL TUNNEL SYNDROME OF LEFT WRIST: Primary | ICD-10-CM

## 2017-06-05 PROCEDURE — 99213 OFFICE O/P EST LOW 20 MIN: CPT | Performed by: FAMILY MEDICINE

## 2017-06-05 ASSESSMENT — PAIN SCALES - GENERAL: PAINLEVEL: MILD PAIN (2)

## 2017-06-05 NOTE — PROGRESS NOTES
SUBJECTIVE:                                                    Dav Seay is a 67 year old male who presents to clinic today for the following health issues:        Left Wrist Pain     Onset: January 2017    Description:   Location: left wrist  Character: Dull ache    Intensity: mild    Progression of Symptoms: worse    Accompanying Signs & Symptoms:  Other symptoms: numbness, tingling, weakness of index finger, thumb, middle finger and little with ring finger and swelling   History:   Previous similar pain: no       Precipitating factors:   Trauma or overuse: YES    Alleviating factors:  Improved by: support wrap       Therapies Tried and outcome: Support wrap helps a little         Problem list and histories reviewed & adjusted, as indicated.  Additional history:     S: Dav Seay is a 67 year old male who complains of numbness of lateral aspect of left hand, especially with use of the hand and at night. He is left handed.        Patient Active Problem List   Diagnosis     CARDIOVASCULAR SCREENING; LDL GOAL LESS THAN 160     Hypertension goal BP (blood pressure) < 140/90     Advance Care Planning     Insomnia, transient     Past Surgical History:   Procedure Laterality Date     COLONOSCOPY  6/10/2013    Procedure: COLONOSCOPY;  Colonoscopy;  Surgeon: Wes Pablo MD;  Location:  GI     HC COLONOSCOPY THRU STOMA W BIOPSY/CAUTERY TUMOR/POLYP/LESION  2003    hyperplastic polyp repeat in 2008.     HC REMOVAL OF TONSILS,<13 Y/O  5 years old    Tonsils <14y.o.     HERNIA REPAIR, INGUINAL RT/LT       JOINT REPLACEMENT, HIP RT/LT  12/19/11    Left total hip arthroplasty. [facility].       Social History   Substance Use Topics     Smoking status: Never Smoker     Smokeless tobacco: Never Used     Alcohol use Yes      Comment: occasional     Family History   Problem Relation Age of Onset     HEART DISEASE Mother      Tachycardia     OSTEOPOROSIS Mother      DIABETES Father      CANCER Father       skin, lymph and colon CA     Arthritis Brother      Anesthesia Reaction No family hx of          Current Outpatient Prescriptions   Medication Sig Dispense Refill     atenolol (TENORMIN) 25 MG tablet TAKE 1 TABLET BY MOUTH EVER Y DAY 90 tablet 3     celecoxib (CELEBREX) 200 MG capsule Take 1 capsule (200 mg) by mouth daily 60 capsule 3     MELATONIN PO        Allergies   Allergen Reactions     No Known Drug Allergies      Recent Labs   Lab Test 03/21/16  02/25/15   0827  12/14/11   0910  08/03/10   0807   LDL  100.0  100   --   130*   HDL  49  49   --   44   TRIG  75  94   --   116   ALT  31   --    --    --    CR  1.3  1.21  1.07  1.28*   GFRESTIMATED  56  61  71  58*   GFRESTBLACK   --   73  86  70   POTASSIUM  4.9  4.5  4.6  4.3      BP Readings from Last 3 Encounters:   06/05/17 130/80   04/08/17 148/72   03/01/17 126/80    Wt Readings from Last 3 Encounters:   06/05/17 211 lb 9.6 oz (96 kg)   04/08/17 210 lb (95.3 kg)   03/01/17 216 lb (98 kg)                  Labs reviewed in EPIC    Reviewed and updated as needed this visit by clinical staff  Tobacco  Allergies  Meds  Problems  Med Hx  Surg Hx  Fam Hx  Soc Hx        Reviewed and updated as needed this visit by Provider      Allergies  Meds  Problems         O: He appears well, vitals are normal. Hand exam revealed bilaterally normal motor power and no atrophy.    RIGHT hand: Phalen's sign negative - Tinel's sign is negative.  LEFT hand: Phalen is positive - Tinel is positive.    A: Carpal tunnel syndrome.      P: Explanation of median nerve entrapment is given. A wrist splint is provided for prn use, especially at night.  The treatment spectrum is discussed, including possible surgical release if the symptoms are persistent and severe.  Return as needed for this problem.      Patient Instructions       What is Carpal Tunnel Syndrome (CTS)?  Carpal tunnel syndrome (CTS) is a problem that affects the wrist and hand. If you have CTS, tingling and  numbness can make even simple tasks hard to do. But CTS can be treated, and your symptoms can be controlled.    Learning about carpal tunnel  The carpal tunnel is a narrow space inside the wrist that is surrounded by bone and ligament. This space lets certain tendons and a major nerve pass from the forearm into the hand. With CTS, the tendon sheaths may thicken and enlarge. This reduces the amount of space inside the carpal tunnel. As a result, the median nerve may be compressed.  The symptoms of CTS  Tingling and numbness are the most common symptoms of CTS. Some people also have hand pain or even a weakened . At first, symptoms may wake you up at night. Later, they may also occur during your daily routines. For instance, you may notice symptoms while you are driving or holding a newspaper. Your symptoms may become more severe over time.     Symptoms of CTS may keep you up at night.    Working with your doctor  Your doctor will perform an exam to learn more about your symptoms. Once your problem is diagnosed, you and your doctor can make a treatment plan. He or she may recommend wrist braces or splints, local corticosteroid injections, and/or surgery, depending on your history and the severity of your physical exam findings. If you have surgery, you are likely to go home the same day.    3026-6470 [employer]. [address] [place], [place] [id number]. All rights reserved. This information is not intended as a substitute for professional medical care. Always follow your healthcare professional's instructions.        Carpal Tunnel Syndrome    Carpal tunnel syndrome is a painful condition of the wrist and arm. It is caused by pressure on the median nerve.  The median nerve is one of the nerves that give feeling and movement to the hand. It passes through a tunnel in the wrist called the carpal tunnel. This tunnel is made up of bones and ligaments. Narrowing of this tunnel or swelling of the tissues  inside the tunnel puts pressure on the median nerve. This causes numbness, pins and needles, or electric shooting pains in your hand and forearm. Often the pain is worse at night and may wake you when you are asleep.  Carpal tunnel syndrome may occur during pregnancy and with use of birth control pills. It is more common in workers who must often bend their wrists. It is also common in people who work with power tools that cause strong vibrations.  Home care    Rest the painful wrist. Avoid repeated bending of the wrist back and forth. This puts pressure on the median nerve. Avoid using power tools with strong vibrations.    If you were given a splint, wear it at night while you sleep. You may also wear it during the day for comfort.    Move your fingers and wrists often to avoid stiffness.    Elevate your arms on pillows when you lie down.    Try using the unaffected hand more.    Try not to hold your wrists in a bent, downward position.    Sometimes changes in the work place may ease symptoms. If you type most of the day, it may help to change the position of your keyboard or add a wrist support. Your wrist should be in a neutral position and not bent back when typing.    You may use over-the-counter pain medicine to treat pain and inflammation, unless another medicine was prescribed. Anti-inflammatory pain medicines, such as ibuprofen or naproxen may be more effective than acetaminophen, which treats pain, but not inflammation. If you have chronic liver or kidney disease or ever had a stomach ulcer or GI bleeding, talk with your doctor before using these medicines.    Opioid pain medicine will only give temporary relief and does not treat the problem. If pain continues, you may need a shot of a steroid drug into your wrist.    If the above methods fail, you may need surgery. This will open the carpal tunnel and release the pressure on the trapped nerve.  Follow-up care  Follow up with your healthcare provider,  or as advised, if the pain doesn t begin to improve within the next week.  If X-rays were taken, you will be notified of any new findings that may affect your care.  When to seek medical advice  Call your healthcare provider right away if any of these occur:    Pain not improving with the above treatment    Fingers or hand become cold, blue, numb, or tingly    Your whole arm becomes swollen or weak    5254-9220 [employer]. [address] [place] [address], [place] [address]. All rights reserved. This information is not intended as a substitute for professional medical care. Always follow your healthcare professional's instructions.        Carpal Tunnel Syndrome Prevention Tips  Some repetitive hand activities put you at higher risk for carpal tunnel syndrome (CTS). But you can reduce your risk. Learn how to change the way you use your hands. Below are tips for at home and on the job. Be sure to also follow the hand and wrist safety policies at your workplace.  Keep your wrist in neutral    Keep a neutral (straight) wrist position as often as you can. Don t use your wrist in a bent (flexed) position for long periods of time. This includes extended or twisted positions.  Watch your   Don t just use your thumb and index finger to grasp or lift. This can put stress on your wrist. When you can, use your whole hand and all its fingers to grasp an object.  Minimize repetition  Don t move your arms or hands or hold an object in the same way for long periods of time. Even simple, light tasks can cause injury this way. Instead, alternate tasks or switch hands.  Rest your hands  Give your hands a break from time to time with a rest. Even a few minutes once an hour can help.  Reduce speed and force  Slow down the speed in which you do a forceful, repetitive motion. This gives your wrist time to recover from the effort. Use power tools to help reduce the force.  Strengthen the muscles     Keep your wrist in a neutral  (straight) position when exercising.     Weak muscles may lead to a poor wrist or arm position. Exercises will make your hand and arm muscles stronger. This can help you keep a better position.    3264-7667 [employer]. [address] [place] [address], [place] [address]. All rights reserved. This information is not intended as a substitute for professional medical care. Always follow your healthcare professional's instructions.        Electronically signed by Ryan Perera MD

## 2017-06-05 NOTE — NURSING NOTE
Chief Complaint   Patient presents with     Musculoskeletal Problem       Initial /80 (BP Location: Left arm, Patient Position: Chair, Cuff Size: Adult Large)  Pulse 63  Temp 97.8  F (36.6  C) (Temporal)  Resp 18  Wt 211 lb 9.6 oz (96 kg)  SpO2 95%  BMI 28.7 kg/m2 Estimated body mass index is 28.7 kg/(m^2) as calculated from the following:    Height as of 4/8/17: 6' (1.829 m).    Weight as of this encounter: 211 lb 9.6 oz (96 kg).  Medication Reconciliation: complete     Zoe Mo MA 6/5/2017  3:09 PM

## 2017-06-05 NOTE — MR AVS SNAPSHOT
After Visit Summary   6/5/2017    Dav Seay    MRN: [id number]           Patient Information     Date Of Birth          1950        Visit Information        Provider Department      6/5/2017 2:50 PM Ryan Perera MD [facility]        Today's Diagnoses     Carpal tunnel syndrome of left wrist    -  1      Care Instructions      What is Carpal Tunnel Syndrome (CTS)?  Carpal tunnel syndrome (CTS) is a problem that affects the wrist and hand. If you have CTS, tingling and numbness can make even simple tasks hard to do. But CTS can be treated, and your symptoms can be controlled.    Learning about carpal tunnel  The carpal tunnel is a narrow space inside the wrist that is surrounded by bone and ligament. This space lets certain tendons and a major nerve pass from the forearm into the hand. With CTS, the tendon sheaths may thicken and enlarge. This reduces the amount of space inside the carpal tunnel. As a result, the median nerve may be compressed.  The symptoms of CTS  Tingling and numbness are the most common symptoms of CTS. Some people also have hand pain or even a weakened . At first, symptoms may wake you up at night. Later, they may also occur during your daily routines. For instance, you may notice symptoms while you are driving or holding a newspaper. Your symptoms may become more severe over time.     Symptoms of CTS may keep you up at night.    Working with your doctor  Your doctor will perform an exam to learn more about your symptoms. Once your problem is diagnosed, you and your doctor can make a treatment plan. He or she may recommend wrist braces or splints, local corticosteroid injections, and/or surgery, depending on your history and the severity of your physical exam findings. If you have surgery, you are likely to go home the same day.    4648-8022 [employer]. [address] [place] [address], [place] [address]. All rights reserved. This information is  not intended as a substitute for professional medical care. Always follow your healthcare professional's instructions.        Carpal Tunnel Syndrome    Carpal tunnel syndrome is a painful condition of the wrist and arm. It is caused by pressure on the median nerve.  The median nerve is one of the nerves that give feeling and movement to the hand. It passes through a tunnel in the wrist called the carpal tunnel. This tunnel is made up of bones and ligaments. Narrowing of this tunnel or swelling of the tissues inside the tunnel puts pressure on the median nerve. This causes numbness, pins and needles, or electric shooting pains in your hand and forearm. Often the pain is worse at night and may wake you when you are asleep.  Carpal tunnel syndrome may occur during pregnancy and with use of birth control pills. It is more common in workers who must often bend their wrists. It is also common in people who work with power tools that cause strong vibrations.  Home care    Rest the painful wrist. Avoid repeated bending of the wrist back and forth. This puts pressure on the median nerve. Avoid using power tools with strong vibrations.    If you were given a splint, wear it at night while you sleep. You may also wear it during the day for comfort.    Move your fingers and wrists often to avoid stiffness.    Elevate your arms on pillows when you lie down.    Try using the unaffected hand more.    Try not to hold your wrists in a bent, downward position.    Sometimes changes in the work place may ease symptoms. If you type most of the day, it may help to change the position of your keyboard or add a wrist support. Your wrist should be in a neutral position and not bent back when typing.    You may use over-the-counter pain medicine to treat pain and inflammation, unless another medicine was prescribed. Anti-inflammatory pain medicines, such as ibuprofen or naproxen may be more effective than acetaminophen, which treats pain,  but not inflammation. If you have chronic liver or kidney disease or ever had a stomach ulcer or GI bleeding, talk with your doctor before using these medicines.    Opioid pain medicine will only give temporary relief and does not treat the problem. If pain continues, you may need a shot of a steroid drug into your wrist.    If the above methods fail, you may need surgery. This will open the carpal tunnel and release the pressure on the trapped nerve.  Follow-up care  Follow up with your healthcare provider, or as advised, if the pain doesn t begin to improve within the next week.  If X-rays were taken, you will be notified of any new findings that may affect your care.  When to seek medical advice  Call your healthcare provider right away if any of these occur:    Pain not improving with the above treatment    Fingers or hand become cold, blue, numb, or tingly    Your whole arm becomes swollen or weak    5457-2615 [employer]. [address] [place] [address]. All rights reserved. This information is not intended as a substitute for professional medical care. Always follow your healthcare professional's instructions.        Carpal Tunnel Syndrome Prevention Tips  Some repetitive hand activities put you at higher risk for carpal tunnel syndrome (CTS). But you can reduce your risk. Learn how to change the way you use your hands. Below are tips for at home and on the job. Be sure to also follow the hand and wrist safety policies at your workplace.  Keep your wrist in neutral    Keep a neutral (straight) wrist position as often as you can. Don t use your wrist in a bent (flexed) position for long periods of time. This includes extended or twisted positions.  Watch your   Don t just use your thumb and index finger to grasp or lift. This can put stress on your wrist. When you can, use your whole hand and all its fingers to grasp an object.  Minimize repetition  Don t move your arms or hands or  hold an object in the same way for long periods of time. Even simple, light tasks can cause injury this way. Instead, alternate tasks or switch hands.  Rest your hands  Give your hands a break from time to time with a rest. Even a few minutes once an hour can help.  Reduce speed and force  Slow down the speed in which you do a forceful, repetitive motion. This gives your wrist time to recover from the effort. Use power tools to help reduce the force.  Strengthen the muscles     Keep your wrist in a neutral (straight) position when exercising.     Weak muscles may lead to a poor wrist or arm position. Exercises will make your hand and arm muscles stronger. This can help you keep a better position.    5826-1151 [employer]. [address] [place] [address], [place] [address]. All rights reserved. This information is not intended as a substitute for professional medical care. Always follow your healthcare professional's instructions.                Follow-ups after your visit        Follow-up notes from your care team     Return if symptoms worsen or fail to improve.      Who to contact     If you have questions or need follow up information about today's clinic visit or your schedule please contact [facility] directly at [phone].  Normal or non-critical lab and imaging results will be communicated to you by [employer], letter or phone within 4 business days after the clinic has received the results. If you do not hear from us within 7 days, please contact the clinic through [employer] or phone. If you have a critical or abnormal lab result, we will notify you by phone as soon as possible.  Submit refill requests through [employer] or call your pharmacy and they will forward the refill request to us. Please allow 3 business days for your refill to be completed.          Additional Information About Your Visit        [email or web address] Information     [employer] gives you secure access to your electronic health record.  If you see a primary care provider, you can also send messages to your care team and make appointments. If you have questions, please call your primary care clinic.  If you do not have a primary care provider, please call [phone] and they will assist you.        Care EveryWhere ID     This is your Care EveryWhere ID. This could be used by other organizations to access your [facility] medical records  [id number]        Your Vitals Were     Pulse Temperature Respirations Pulse Oximetry BMI (Body Mass Index)       63 97.8  F (36.6  C) (Temporal) 18 95% 28.7 kg/m2        Blood Pressure from Last 3 Encounters:   06/05/17 130/80   04/08/17 148/72   03/01/17 126/80    Weight from Last 3 Encounters:   06/05/17 211 lb 9.6 oz (96 kg)   04/08/17 210 lb (95.3 kg)   03/01/17 216 lb (98 kg)              Today, you had the following     No orders found for display       Primary Care Provider Office Phone # Fax #    Ryan Perera [phone]       [facility] [address] [facility], located within [facility] [address]        Thank you!     Thank you for choosing [facility]  for your care. Our goal is always to provide you with excellent care. Hearing back from our patients is one way we can continue to improve our services. Please take a few minutes to complete the written survey that you may receive in the mail after your visit with us. Thank you!             Your Updated Medication List - Protect others around you: Learn how to safely use, store and throw away your medicines at www.disposemymeds.org.          This list is accurate as of: 6/5/17  3:30 PM.  Always use your most recent med list.                   Brand Name Dispense Instructions for use    atenolol 25 MG tablet    TENORMIN    90 tablet    TAKE 1 TABLET BY MOUTH EVER Y DAY       celecoxib 200 MG capsule    celeBREX    60 capsule    Take 1 capsule (200 mg) by mouth daily       MELATONIN PO

## 2017-06-11 ENCOUNTER — HOSPITAL ENCOUNTER (EMERGENCY)
Facility: CLINIC | Age: 67
Discharge: HOME | End: 2017-06-11
Attending: EMERGENCY MEDICINE | Admitting: EMERGENCY MEDICINE
Payer: COMMERCIAL

## 2017-06-11 VITALS
BODY MASS INDEX: 29.23 KG/M2 | RESPIRATION RATE: 18 BRPM | TEMPERATURE: 97.1 F | OXYGEN SATURATION: 97 % | WEIGHT: 215.5 LBS | DIASTOLIC BLOOD PRESSURE: 79 MMHG | SYSTOLIC BLOOD PRESSURE: 135 MMHG

## 2017-06-11 DIAGNOSIS — J01.90 ACUTE SINUSITIS WITH SYMPTOMS > 10 DAYS: ICD-10-CM

## 2017-06-11 PROCEDURE — 99282 EMERGENCY DEPT VISIT SF MDM: CPT | Performed by: EMERGENCY MEDICINE

## 2017-06-11 PROCEDURE — 99284 EMERGENCY DEPT VISIT MOD MDM: CPT | Mod: Z6 | Performed by: EMERGENCY MEDICINE

## 2017-06-11 RX ORDER — CEFDINIR 300 MG/1
600 CAPSULE ORAL DAILY
Qty: 20 CAPSULE | Refills: 0 | Status: SHIPPED | OUTPATIENT
Start: 2017-06-11 | End: 2017-06-23

## 2017-06-11 NOTE — ED AVS SNAPSHOT
[facility] Emergency Department    [address] [place] [address]    Phone:  [phone]    Fax:  [phone]                                       Dav Seay   MRN: [id number]    Department:  [facility] Emergency Department   Date of Visit:  6/11/2017           Patient Information     Date Of Birth          1950        Your diagnoses for this visit were:     Acute sinusitis with symptoms > 10 days        You were seen by Su Rojas MD.      Follow-up Information     Follow up with Ryan Perera MD In 1 week.    Specialty:  Family Practice    Why:  As needed    Contact information:    [facility]  [address] [facility] [address] [phone]          Discharge Instructions       Be sure to drink plenty of fluids and rest.    Antibiotics as prescribed.    Can use a saline nasal spray if desired to help moisten and clear the nasal passages.    Follow up in clinic if not improving through the next week or so and return at anytime for worsening, changes or concerns.    I hope you feel MUCH better soon!!    Discharge References/Attachments     SINUSITIS (ANTIBIOTIC TREATMENT) (ENGLISH)      24 Hour Appointment Hotline       To make an appointment at any [facility], call [phone] ([phone]). If you don't have a family doctor or clinic, we will help you find one. [facility] clinics are conveniently located to serve the needs of you and your family.             Review of your medicines      START taking        Dose / Directions Last dose taken    cefdinir 300 MG capsule   Commonly known as:  OMNICEF   Dose:  600 mg   Quantity:  20 capsule        Take 2 capsules (600 mg) by mouth daily   Refills:  0          Our records show that you are taking the medicines listed below. If these are incorrect, please call your family doctor or clinic.        Dose / Directions Last dose taken    ALEVE PO        Refills:  0        atenolol 25 MG tablet    Commonly known as:  TENORMIN   Quantity:  90 tablet        TAKE 1 TABLET BY MOUTH EVER Y DAY   Refills:  3        celecoxib 200 MG capsule   Commonly known as:  celeBREX   Dose:  200 mg   Quantity:  60 capsule        Take 1 capsule (200 mg) by mouth daily   Refills:  3        MELATONIN PO        Refills:  0                Prescriptions were sent or printed at these locations (1 Prescription)                   [facility] [address] [place] [address] [address]   [address] [facility] [address]    Telephone:  [phone]   Fax:  [phone]   Hours:                  E-Prescribed (1 of 1)         cefdinir (OMNICEF) 300 MG capsule                Orders Needing Specimen Collection     None      Pending Results     No orders found from 6/9/2017 to 6/12/2017.            Pending Culture Results     No orders found from 6/9/2017 to 6/12/2017.            Pending Results Instructions     If you had any lab results that were not finalized at the time of your Discharge, you can call the ED Lab Result RN at [phone]. You will be contacted by this team for any positive Lab results or changes in treatment. The nurses are available 7 days a week from 10A to 6:30P.  You can leave a message 24 hours per day and they will return your call.        Thank you for choosing [facility]       Thank you for choosing [facility] for your care. Our goal is always to provide you with excellent care. Hearing back from our patients is one way we can continue to improve our services. Please take a few minutes to complete the written survey that you may receive in the mail after you visit with us. Thank you!        [facility] Information     [employer] gives you secure access to your electronic health record. If you see a primary care provider, you can also send messages to your care team and make appointments. If you have questions, please call your primary care clinic.  If you do not have a primary care provider, please call [phone] and  they will assist you.        Care EveryWhere ID     This is your Care EveryWhere ID. This could be used by other organizations to access your [facility] medical records  [id number]        After Visit Summary       This is your record. Keep this with you and show to your community pharmacist(s) and doctor(s) at your next visit.

## 2017-06-11 NOTE — ED AVS SNAPSHOT
[facility] Emergency Department    [address] [place] [address]    Phone:  [phone]    Fax:  [phone]                                       Dav Seay   MRN: [id number]    Department:  [facility] Emergency Department   Date of Visit:  6/11/2017           After Visit Summary Signature Page     I have received my discharge instructions, and my questions have been answered. I have discussed any challenges I see with this plan with the nurse or doctor.    ..........................................................................................................................................  Patient/Patient Representative Signature      ..........................................................................................................................................  Patient Representative Print Name and Relationship to Patient    ..................................................               ................................................  Date                                            Time    ..........................................................................................................................................  Reviewed by Signature/Title    ...................................................              ..............................................  Date                                                            Time

## 2017-06-11 NOTE — ED NOTES
States has had a cold for over a week, and started coughing up green sputum this past Tuesday or Wednesday, comes in today for worsening symptoms.

## 2017-06-12 NOTE — ED PROVIDER NOTES
History     Chief Complaint   Patient presents with     Sinusitis     The history is provided by the patient and medical records.     This is a 67-year-old male with history of hypertension presenting with sinusitis. Patient started having URI symptoms with runny nose, sinus discomfort, cough 2 weeks ago.  Symptoms lasted for about a week and then seemed to improve for a couple of days. He then started having worsening symptoms about 5 days ago. He has increased sinus pressure and has been coughing up green sputum. He notes that this seems to be postnasal rather than from his chest. He does not have any chest tightness. No fevers or chills. No ear pain. Eating and drinking normally. No bowel or bladder symptoms. Patient does have a history of sinusitis. He was traveling in [place] when his symptoms started. He believes he had sick contacts in the airplane. No other complaints. Patient does not smoke.    I have reviewed the Medications, Allergies, Past Medical and Surgical History, and Social History in the Epic system.    Allergies:   Allergies   Allergen Reactions     No Known Drug Allergies          No current facility-administered medications on file prior to encounter.   Current Outpatient Prescriptions on File Prior to Encounter:  atenolol (TENORMIN) 25 MG tablet TAKE 1 TABLET BY MOUTH EVER Y DAY   celecoxib (CELEBREX) 200 MG capsule Take 1 capsule (200 mg) by mouth daily   MELATONIN PO        Patient Active Problem List   Diagnosis     CARDIOVASCULAR SCREENING; LDL GOAL LESS THAN 160     Hypertension goal BP (blood pressure) < 140/90     Advance Care Planning     Insomnia, transient       Past Surgical History:   Procedure Laterality Date     COLONOSCOPY  6/10/2013    Procedure: COLONOSCOPY;  Colonoscopy;  Surgeon: Wes Pablo MD;  Location:  GI     HC COLONOSCOPY THRU STOMA W BIOPSY/CAUTERY TUMOR/POLYP/LESION  2003    hyperplastic polyp repeat in 2008.     HC REMOVAL OF TONSILS,<11 Y/O  5 years  old    Tonsils <12y.o.     HERNIA REPAIR, INGUINAL RT/LT       JOINT REPLACEMENT, HIP RT/LT  12/19/11    Left total hip arthroplasty. [facility].       Social History   Substance Use Topics     Smoking status: Never Smoker     Smokeless tobacco: Never Used     Alcohol use Yes      Comment: occasional       Most Recent Immunizations   Administered Date(s) Administered     Hepatitis A Vac Ped/Adol-2 Dose 10/17/2012     Hepatitis B 10/17/2012     Influenza (IIV3) 09/11/2013     TD (ADULT, 7+) 12/18/2000     TDAP Vaccine (Adacel) 08/02/2010     Zoster vaccine, live 09/11/2013       BMI: Estimated body mass index is 29.23 kg/(m^2) as calculated from the following:    Height as of 4/8/17: 1.829 m (6').    Weight as of this encounter: 97.8 kg (215 lb 8 oz).      Review of Systems   All other ROS reviewed and are negative or non-contributory except as stated in HPI.     Physical Exam   BP: 135/79  Heart Rate: 53  Temp: 97.1  F (36.2  C)  Resp: 18  Weight: 97.8 kg (215 lb 8 oz)  SpO2: 97 %  Physical Exam   Constitutional: He is oriented to person, place, and time. He appears well-developed and well-nourished.   Pleasant, healthy appearing male sitting in the bed   HENT:   Head: Normocephalic.   Right Ear: External ear normal.   Left Ear: External ear normal.   Mouth/Throat: Oropharynx is clear and moist.   Tenderness over the maxillary sinuses  Nasal congestion   Eyes: Conjunctivae and EOM are normal. Pupils are equal, round, and reactive to light.   Neck: Normal range of motion. Neck supple.   Cardiovascular: Regular rhythm, normal heart sounds and intact distal pulses.    Slight bradycardia   Pulmonary/Chest: Effort normal and breath sounds normal.   Musculoskeletal: Normal range of motion. He exhibits no tenderness.   Neurological: He is alert and oriented to person, place, and time. He exhibits normal muscle tone.   Skin: Skin is warm and dry. He is not diaphoretic.   Psychiatric: He has a normal mood and  affect. His behavior is normal.   Vitals reviewed.      ED Course (with Medical Decision Making)    Pt seen and examined by me.  RN and EPIC notes reviewed.      Patient with sinus symptoms that initially started almost 2 weeks ago or so. He has had worsening over the last week. Because of the worsening symptoms and an extended course, and going to treat him with antibiotics. We talked about using nasal saline spray also. Follow-up with primary care provider if not improving over the next week or so and return at any time for worsening, changes or concerns.      Procedures      Assessments & Plan   I have reviewed the findings, diagnosis, plan and need for follow up prn with the patient.    Discharge Medication List as of 6/11/2017  1:22 PM      START taking these medications    Details   cefdinir (OMNICEF) 300 MG capsule Take 2 capsules (600 mg) by mouth daily, Disp-20 capsule, R-0, E-Prescribe             Final diagnoses:   Acute sinusitis with symptoms > 10 days     Disposition: Patient discharged home in stable condition.  Plan as above.  Return for concerns.   Note: Chart documentation done in part with Dragon Voice Recognition software. Although reviewed after completion, some word and grammatical errors may remain.     6/11/2017   [facility] EMERGENCY DEPARTMENT     Su Rojas MD  06/12/17 0010

## 2017-06-20 ENCOUNTER — TELEPHONE (OUTPATIENT)
Dept: FAMILY MEDICINE | Facility: OTHER | Age: 67
End: 2017-06-20

## 2017-06-20 NOTE — TELEPHONE ENCOUNTER
Reason for call:  Patient reporting a symptom    Symptom or request: left wrist pain, not improving, maybe a little worse, numbness in fingers    Duration (how long have symptoms been present): seen last, 6-6-17    Have you been treated for this before? Yes    Additional comments: asking if you can see him next week, or if testing can be ordered and scheduled.     Phone Number patient can be reached at:  Home number on file [phone] (home)    Best Time:  any    Can we leave a detailed message on this number:  YES, aware Dr is out today.     Call taken on 6/20/2017 at 9:39 AM by Jacqueline Muir

## 2017-06-20 NOTE — TELEPHONE ENCOUNTER
OK for workin to next available non Same Day appointment. Please call patient  to schedule time.  Electronically signed by Ryan Perera MD

## 2017-06-23 ENCOUNTER — OFFICE VISIT (OUTPATIENT)
Dept: FAMILY MEDICINE | Facility: OTHER | Age: 67
End: 2017-06-23
Payer: COMMERCIAL

## 2017-06-23 VITALS
SYSTOLIC BLOOD PRESSURE: 130 MMHG | RESPIRATION RATE: 18 BRPM | WEIGHT: 214.7 LBS | BODY MASS INDEX: 29.12 KG/M2 | HEART RATE: 65 BPM | DIASTOLIC BLOOD PRESSURE: 74 MMHG | OXYGEN SATURATION: 96 % | TEMPERATURE: 97.8 F

## 2017-06-23 DIAGNOSIS — G56.02 CARPAL TUNNEL SYNDROME OF LEFT WRIST: Primary | ICD-10-CM

## 2017-06-23 PROCEDURE — 99213 OFFICE O/P EST LOW 20 MIN: CPT | Performed by: FAMILY MEDICINE

## 2017-06-23 ASSESSMENT — PAIN SCALES - GENERAL: PAINLEVEL: NO PAIN (0)

## 2017-06-23 NOTE — PATIENT INSTRUCTIONS
Having EMG and NCS Tests  You will be having electromyography (EMG) and nerve conduction studies (NCS) to measure muscle and nerve function. In most cases, both tests are done. NCS is most often done first. You will be asked to lie on an exam table with a blanket over you. You may have one or both of the following:    Nerve conduction study (NCS)  During NCS, mild electrical currents are used to test how fast impulses move along your nerves. The healthcare provider will put small metal disks (electrodes) on your skin on the area of your body being tested. This will be done using water-based gel or paste. A doctor or technologist will apply mild electrical currents to your skin. Your muscles will twitch, but the test won t harm you. Currents are usually applied to the same area several times. Usually the intensity of the electrical stimulation is increased on each body part. Despite some increasing discomfort that varies from person to person, the electrical shock is not dangerous. The test may continue on other parts of your body unless the reason for doing the test is limited to a small part of the body.  Electromyography (EMG)  Most of the electrodes will be removed for EMG. The doctor will clean the area being tested with alcohol. A very fine needle will be put into the muscles in this region. When the needle is inserted, you may feel as if your skin is being pinched. Try to relax and do as instructed, since you will be asked to relax and contract the muscle being tested. Following instructions will allow your doctor to interpret the test results.  Let the technologist know  For your safety and for the success of your test, tell the technologist if you:    Have any bleeding problems.    Take blood thinners (anticoagulants) or other medications, including aspirin.    Have any immune system problems.    Have had neck or back surgery.  You may also be asked questions about your overall health.  Before the  test  Prepare for your test as instructed. Shower or bathe, but don't use powder, oil, or lotion. Your skin should be clean and free of excess oil. Wear loose clothes. But know that you may be asked to change into a hospital gown. The entire test will take about 60 minutes. Be sure to allow extra time to check in.  After your test  Before you leave, all electrodes will be removed. You can then get right back to your normal routine. If you feel tired or have some discomfort, take it easy. If you were told to stop taking any medicines for your test, ask when you can start taking them again. Your doctor will let you know when your test results are ready.  Date Last Reviewed: 9/12/2015 2000-2017 [employer]. [address] [place] [address], [place] [address]. All rights reserved. This information is not intended as a substitute for professional medical care. Always follow your healthcare professional's instructions.

## 2017-06-23 NOTE — MR AVS SNAPSHOT
After Visit Summary   6/23/2017    Dav Seay    MRN: [id number]           Patient Information     Date Of Birth          1950        Visit Information        Provider Department      6/23/2017 2:50 PM Ryan Perera MD [facility]        Today's Diagnoses     Carpal tunnel syndrome of left wrist    -  1      Care Instructions      Having EMG and NCS Tests  You will be having electromyography (EMG) and nerve conduction studies (NCS) to measure muscle and nerve function. In most cases, both tests are done. NCS is most often done first. You will be asked to lie on an exam table with a blanket over you. You may have one or both of the following:    Nerve conduction study (NCS)  During NCS, mild electrical currents are used to test how fast impulses move along your nerves. The healthcare provider will put small metal disks (electrodes) on your skin on the area of your body being tested. This will be done using water-based gel or paste. A doctor or technologist will apply mild electrical currents to your skin. Your muscles will twitch, but the test won t harm you. Currents are usually applied to the same area several times. Usually the intensity of the electrical stimulation is increased on each body part. Despite some increasing discomfort that varies from person to person, the electrical shock is not dangerous. The test may continue on other parts of your body unless the reason for doing the test is limited to a small part of the body.  Electromyography (EMG)  Most of the electrodes will be removed for EMG. The doctor will clean the area being tested with alcohol. A very fine needle will be put into the muscles in this region. When the needle is inserted, you may feel as if your skin is being pinched. Try to relax and do as instructed, since you will be asked to relax and contract the muscle being tested. Following instructions will allow your doctor to interpret the test  results.  Let the technologist know  For your safety and for the success of your test, tell the technologist if you:    Have any bleeding problems.    Take blood thinners (anticoagulants) or other medications, including aspirin.    Have any immune system problems.    Have had neck or back surgery.  You may also be asked questions about your overall health.  Before the test  Prepare for your test as instructed. Shower or bathe, but don't use powder, oil, or lotion. Your skin should be clean and free of excess oil. Wear loose clothes. But know that you may be asked to change into a hospital gown. The entire test will take about 60 minutes. Be sure to allow extra time to check in.  After your test  Before you leave, all electrodes will be removed. You can then get right back to your normal routine. If you feel tired or have some discomfort, take it easy. If you were told to stop taking any medicines for your test, ask when you can start taking them again. Your doctor will let you know when your test results are ready.  Date Last Reviewed: 9/12/2015 2000-2017 [employer] ([employer]). [address] [place] [address]. All rights reserved. This information is not intended as a substitute for professional medical care. Always follow your healthcare professional's instructions.                Follow-ups after your visit        Your next 10 appointments already scheduled     Jul 06, 2017  8:00 AM CDT   EMG with Kraolina Lopez MD   [facility] [[email or web address] ([facility] [[email or web address]])    [address] [place] [address] [phone]              Future tests that were ordered for you today     Open Future Orders        Priority Expected Expires Ordered    EMG Routine  7/23/2017 6/23/2017            Who to contact     If you have questions or need follow up information about today's clinic visit or your schedule please contact [facility] directly at [phone].  Normal or  non-critical lab and imaging results will be communicated to you by MyChart, letter or phone within 4 business days after the clinic has received the results. If you do not hear from us within 7 days, please contact the clinic through [employer] or phone. If you have a critical or abnormal lab result, we will notify you by phone as soon as possible.  Submit refill requests through [employer] or call your pharmacy and they will forward the refill request to us. Please allow 3 business days for your refill to be completed.          Additional Information About Your Visit        [employer] Information     [employer] gives you secure access to your electronic health record. If you see a primary care provider, you can also send messages to your care team and make appointments. If you have questions, please call your primary care clinic.  If you do not have a primary care provider, please call [phone] and they will assist you.        Care EveryWhere ID     This is your Care EveryWhere ID. This could be used by other organizations to access your [facility] medical records  [id number]        Your Vitals Were     Pulse Temperature Respirations Pulse Oximetry BMI (Body Mass Index)       65 97.8  F (36.6  C) (Temporal) 18 96% 29.12 kg/m2        Blood Pressure from Last 3 Encounters:   06/23/17 130/74   06/11/17 135/79   06/05/17 130/80    Weight from Last 3 Encounters:   06/23/17 214 lb 11.2 oz (97.4 kg)   06/11/17 215 lb 8 oz (97.8 kg)   06/05/17 211 lb 9.6 oz (96 kg)               Primary Care Provider Office Phone # Fax #    Ryan Perera [phone]       [facility] [address] [facility] [address]        Equal Access to Services     JACKIE ALBERT : Hadii rehan espinoza Soanna, waaxda luqadaha, qaybta kaalmada adekrisyada, neto kwok. So [facility] [phone].    ATENCIÓN: Si habla español, tiene a patten disposición servicios gratuitos de asistencia lingüística. Llame al  [phone].    We comply with applicable federal civil rights laws and [place] laws. We do not discriminate on the basis of race, color, national origin, age, disability sex, sexual orientation or gender identity.            Thank you!     Thank you for choosing [facility]  for your care. Our goal is always to provide you with excellent care. Hearing back from our patients is one way we can continue to improve our services. Please take a few minutes to complete the written survey that you may receive in the mail after your visit with us. Thank you!             Your Updated Medication List - Protect others around you: Learn how to safely use, store and throw away your medicines at www.disposemymeds.org.          This list is accurate as of: 6/23/17  3:45 PM.  Always use your most recent med list.                   Brand Name Dispense Instructions for use Diagnosis    ALEVE PO           atenolol 25 MG tablet    TENORMIN    90 tablet    TAKE 1 TABLET BY MOUTH EVER Y DAY    Hypertension goal BP (blood pressure) < 140/90       celecoxib 200 MG capsule    celeBREX    60 capsule    Take 1 capsule (200 mg) by mouth daily    Arthralgia, unspecified joint       MELATONIN PO

## 2017-06-23 NOTE — NURSING NOTE
Chief Complaint   Patient presents with     Musculoskeletal Problem       Initial /74 (BP Location: Left arm, Patient Position: Chair, Cuff Size: Adult Large)  Pulse 65  Temp 97.8  F (36.6  C) (Temporal)  Resp 18  Wt 214 lb 11.2 oz (97.4 kg)  SpO2 96%  BMI 29.12 kg/m2 Estimated body mass index is 29.12 kg/(m^2) as calculated from the following:    Height as of 4/8/17: 6' (1.829 m).    Weight as of this encounter: 214 lb 11.2 oz (97.4 kg).  Medication Reconciliation: complete     Zoe Mo MA 6/23/2017  3:14 PM

## 2017-06-23 NOTE — PROGRESS NOTES
SUBJECTIVE:                                                    Dav Seay is a 67 year old male who presents to clinic today for the following health issues:        Left Wrist Pain    Onset: 3 to 4 months    Description:   Location: left wrist  Character: Burning and Numbess    Intensity: mild    Progression of Symptoms: same    Accompanying Signs & Symptoms:  Other symptoms: numbness, tingling, weakness of wrist, thumb and fingers and swelling    History:   Previous similar pain: no       Precipitating factors:   Trauma or overuse: no     Alleviating factors:  Improved by: Brace seems to help little at night    Therapies Tried and outcome: Brace worn at night seems to help a little.         Problem list and histories reviewed & adjusted, as indicated.  Additional history: He has worn left wrist splint at bedtime for 2-3 weeks but finds it difficult to wear during the day at work.    Patient Active Problem List   Diagnosis     CARDIOVASCULAR SCREENING; LDL GOAL LESS THAN 160     Hypertension goal BP (blood pressure) < 140/90     Advance Care Planning     Insomnia, transient     Past Surgical History:   Procedure Laterality Date     COLONOSCOPY  6/10/2013    Procedure: COLONOSCOPY;  Colonoscopy;  Surgeon: Wes Pablo MD;  Location:  GI     HC COLONOSCOPY THRU STOMA W BIOPSY/CAUTERY TUMOR/POLYP/LESION  2003    hyperplastic polyp repeat in 2008.     HC REMOVAL OF TONSILS,<15 Y/O  5 years old    Tonsils <14y.o.     HERNIA REPAIR, INGUINAL RT/LT       JOINT REPLACEMENT, HIP RT/LT  12/19/11    Left total hip arthroplasty. [facility].       Social History   Substance Use Topics     Smoking status: Never Smoker     Smokeless tobacco: Never Used     Alcohol use Yes      Comment: occasional     Family History   Problem Relation Age of Onset     HEART DISEASE Mother      Tachycardia     OSTEOPOROSIS Mother      DIABETES Father      CANCER Father      skin, lymph and colon CA     Arthritis Brother       Anesthesia Reaction No family hx of          Current Outpatient Prescriptions   Medication Sig Dispense Refill     Naproxen Sodium (ALEVE PO)        atenolol (TENORMIN) 25 MG tablet TAKE 1 TABLET BY MOUTH EVER Y DAY 90 tablet 3     celecoxib (CELEBREX) 200 MG capsule Take 1 capsule (200 mg) by mouth daily 60 capsule 3     MELATONIN PO        Allergies   Allergen Reactions     No Known Drug Allergies      Recent Labs   Lab Test 03/16/17 03/21/16  02/25/15   0827  12/14/11   0910  08/03/10   0807   LDL   --   100.0  100   --   130*   HDL   --   49  49   --   44   TRIG   --   75  94   --   116   ALT  41  31   --    --    --    CR  1.3  1.3  1.21  1.07  1.28*   GFRESTIMATED  56  56  61  71  58*   GFRESTBLACK   --    --   73  86  70   POTASSIUM  5.1*  4.9  4.5  4.6  4.3      BP Readings from Last 3 Encounters:   06/23/17 130/74   06/11/17 135/79   06/05/17 130/80    Wt Readings from Last 3 Encounters:   06/23/17 214 lb 11.2 oz (97.4 kg)   06/11/17 215 lb 8 oz (97.8 kg)   06/05/17 211 lb 9.6 oz (96 kg)                    Reviewed and updated as needed this visit by clinical staff  Tobacco  Allergies  Meds  Problems  Med Hx  Surg Hx  Fam Hx  Soc Hx        Reviewed and updated as needed this visit by Provider  Allergies  Meds  Problems         ROS:  Constitutional, HEENT, cardiovascular, pulmonary, gi and gu systems are negative, except as otherwise noted.    OBJECTIVE:     /74 (BP Location: Left arm, Patient Position: Chair, Cuff Size: Adult Large)  Pulse 65  Temp 97.8  F (36.6  C) (Temporal)  Resp 18  Wt 214 lb 11.2 oz (97.4 kg)  SpO2 96%  BMI 29.12 kg/m2  Body mass index is 29.12 kg/(m^2).  WRISTS: Exam reveals positive Phalen and Tinel sign on left. There is no muscle atrophy noted. Strength and sensation are normal.      Diagnostic Test Results:  none     ASSESSMENT/PLAN:       1. Carpal tunnel syndrome of left wrist  Will obtain EMG. Continue current cares. If positive the referral to Ortho  for CT release.  - EMG; Future    FURTHER TESTING:       - EMG    Patient Instructions     Having EMG and NCS Tests  You will be having electromyography (EMG) and nerve conduction studies (NCS) to measure muscle and nerve function. In most cases, both tests are done. NCS is most often done first. You will be asked to lie on an exam table with a blanket over you. You may have one or both of the following:    Nerve conduction study (NCS)  During NCS, mild electrical currents are used to test how fast impulses move along your nerves. The healthcare provider will put small metal disks (electrodes) on your skin on the area of your body being tested. This will be done using water-based gel or paste. A doctor or technologist will apply mild electrical currents to your skin. Your muscles will twitch, but the test won t harm you. Currents are usually applied to the same area several times. Usually the intensity of the electrical stimulation is increased on each body part. Despite some increasing discomfort that varies from person to person, the electrical shock is not dangerous. The test may continue on other parts of your body unless the reason for doing the test is limited to a small part of the body.  Electromyography (EMG)  Most of the electrodes will be removed for EMG. The doctor will clean the area being tested with alcohol. A very fine needle will be put into the muscles in this region. When the needle is inserted, you may feel as if your skin is being pinched. Try to relax and do as instructed, since you will be asked to relax and contract the muscle being tested. Following instructions will allow your doctor to interpret the test results.  Let the technologist know  For your safety and for the success of your test, tell the technologist if you:    Have any bleeding problems.    Take blood thinners (anticoagulants) or other medications, including aspirin.    Have any immune system problems.    Have had neck or back  surgery.  You may also be asked questions about your overall health.  Before the test  Prepare for your test as instructed. Shower or bathe, but don't use powder, oil, or lotion. Your skin should be clean and free of excess oil. Wear loose clothes. But know that you may be asked to change into a hospital gown. The entire test will take about 60 minutes. Be sure to allow extra time to check in.  After your test  Before you leave, all electrodes will be removed. You can then get right back to your normal routine. If you feel tired or have some discomfort, take it easy. If you were told to stop taking any medicines for your test, ask when you can start taking them again. Your doctor will let you know when your test results are ready.  Date Last Reviewed: 9/12/2015 2000-2017 [employer]. [address] [place] [address], [place] [address]. All rights reserved. This information is not intended as a substitute for professional medical care. Always follow your healthcare professional's instructions.            Ryan Perera MD  [facility]

## 2017-07-06 ENCOUNTER — OFFICE VISIT (OUTPATIENT)
Dept: NEUROLOGY | Facility: CLINIC | Age: 67
End: 2017-07-06
Attending: FAMILY MEDICINE
Payer: COMMERCIAL

## 2017-07-06 DIAGNOSIS — G56.02 CARPAL TUNNEL SYNDROME OF LEFT WRIST: ICD-10-CM

## 2017-07-06 PROCEDURE — 95911 NRV CNDJ TEST 9-10 STUDIES: CPT | Performed by: PSYCHIATRY & NEUROLOGY

## 2017-07-06 PROCEDURE — 95886 MUSC TEST DONE W/N TEST COMP: CPT | Performed by: PSYCHIATRY & NEUROLOGY

## 2017-07-06 NOTE — PROGRESS NOTES
[facility]  Electrodiagnostic Laboratory    Nerve Conduction & EMG Report          Patient: Dav Seay YOB: 1950  Patient ID: [id number] Age: 67 Years 3 Months  Gender: Male        History & Examination: 67 years old, left handed male with numbness over the left 2-4 digits. + left thenar atrophy       Techniques: Motor conduction studies were done with surface recording electrodes. Sensory conduction studies were performed with surface electrodes, unless indicated otherwise by (n), designating the use of subdermal recording electrodes. Temperature was monitored and recorded throughout the study. Upper extremities were maintained at a temperature of 32 degrees Centigrade or higher.  Lower extremities were maintained at a temperature of 31degrees Centigrade or higher. EMG was done with a concentric needle electrode.        Results:  Nerve conduction studies  1. Left median sensory and motor responses are not recorded.   2. In the left interosseous second lumbricals comparison study, distal latency in median nerve is significantly prolonged in comparison to ulnar nerve.   3. Right median sensory study shows markedly reduced SNAP with prolonged distal latency. CV across the wrist is slow.   4. Right median motor study shows prolonged distal latency with low normal CMAP. CV in the forearm segment is normal.   5. In the right interosseous second lumbricals comparison study, distal latency in median nerve is prolonged in comparison to ulnar nerve.   6. Left ulnar sensory study shows decreased SNAP with prolonged peak distal latency. CV across the wrist is mildly slow.   7. Left ulnar motor study shows mild slowing across the elbow. Distal CMAP and distal latency are normal.     Needle EMG  1. There was mild fibrillation potentials observed only in the left PT muscle.   2. MUAPs were normal in morphology and recruitment pattens in the muscles sampled in the left upper extremity.      Interpretation:  1. This test is abnormal.  2. The neurophysiologic studies are consistent with median neuropathy at the wrist bilaterally, severe in the left side and moderate in the right side.   3. The study is suggestive of mild ulnar neuropathy at the elbow in the left side.       EMG Physician: Karolina Lopez MD              Sensory NCS      Nerve / Sites Rec. Site Onset Peak NP Amp Ref. PP Amp Dist Edgar Ref. Temp     ms ms  V  V  V cm m/s m/s  C   L MEDIAN - Dig II Anti      Wrist Dig II NR NR NR 10.0 NR 14 NR 48.0 32   R MEDIAN - Dig II Anti      Wrist Dig II 5.00 5.89 1.7 10.0 2.5 14 28.0 48.0 32.4   L ULNAR - Dig V Anti      Wrist Dig V 2.76 3.59 4.6 8.0 2.5 12.5 45.3 48.0 32   L RADIAL - Snuff      Forearm Snuff 1.61 2.19 16.5 15.0 33.9 10 61.9 48.0 32.2       Motor NCS      Nerve / Sites Rec. Site Lat Ref. Amp Ref. Rel Amp Dist Edgar Ref. Dur. Area Temp.     ms ms mV mV % cm m/s m/s ms %  C   L MEDIAN - APB      Wrist APB NR 4.40 NR 5.0 NR 8   NR NR 32   R MEDIAN - APB      Wrist APB 6.15 4.40 5.2 5.0 100 8   6.61 100 32.1      Elbow APB 10.94  5.2  101 23 48.0 48.0 6.88 108 32   L ULNAR - ADM      Wrist ADM 2.81 3.50 12.0 5.0 100 8   5.68 100 32.1      B.Elbow ADM 6.98  12.0  100 22 52.8 48.0 5.63 97.5 32      A.Elbow ADM 9.48  11.6  96.8 10 40.0 48.0 5.68 95.1 32.1   L MEDIAN - II Lumb      Median II Lumb 5.63  0.5  100 10   9.58 100 32.2      Ulnar Palm Int 2.60  2.3  461 10   6.25 270 32.2   R MEDIAN - II Lumb      Median II Lumb 5.36  0.7  100 10   8.18 100 31.9      Ulnar Palm Int 3.28  1.6  223 10   5.83 126 32.2       EMG Summary Table     Spontaneous MUAP Recruitment    IA Fib Fasc H.F. Amp Dur. PPP Pattern   L. PRON TERES Normal 1+ None None N N None Normal   L. FLEX CARPI RAD Normal None None None N N None Normal   L. ABD POLL LONG Normal None None None N N None Normal   L. FLEX DIG PROF IV Normal None None None N N None Normal   L. FIRST D INTEROSS Normal None None None N N None Normal    L. BICEPS Normal None None None N N None Normal

## 2017-07-06 NOTE — MR AVS SNAPSHOT
After Visit Summary   7/6/2017    Dav Seay    MRN: [id number]           Patient Information     Date Of Birth          1950        Visit Information        Provider Department      7/6/2017 8:00 AM Karolina Lopez MD [facility]        Today's Diagnoses     Carpal tunnel syndrome of left wrist           Follow-ups after your visit        Who to contact     If you have questions or need follow up information about today's clinic visit or your schedule please contact [facility] directly at [phone].  Normal or non-critical lab and imaging results will be communicated to you by [employer], letter or phone within 4 business days after the clinic has received the results. If you do not hear from us within 7 days, please contact the clinic through [employer] or phone. If you have a critical or abnormal lab result, we will notify you by phone as soon as possible.  Submit refill requests through [employer] or call your pharmacy and they will forward the refill request to us. Please allow 3 business days for your refill to be completed.          Additional Information About Your Visit        MyChart Information     [employer] gives you secure access to your electronic health record. If you see a primary care provider, you can also send messages to your care team and make appointments. If you have questions, please call your primary care clinic.  If you do not have a primary care provider, please call [phone] and they will assist you.      [employer] is an electronic gateway that provides easy, online access to your medical records. With [employer], you can request a clinic appointment, read your test results, renew a prescription or communicate with your care team.     To access your existing account, please contact your [facility] or call [phone] for assistance.        Care EveryWhere ID     This is your Care EveryWhere ID. This could  be used by other organizations to access your [facility] medical records  [id number]         Blood Pressure from Last 3 Encounters:   06/23/17 130/74   06/11/17 135/79   06/05/17 130/80    Weight from Last 3 Encounters:   06/23/17 97.4 kg (214 lb 11.2 oz)   06/11/17 97.8 kg (215 lb 8 oz)   06/05/17 96 kg (211 lb 9.6 oz)              We Performed the Following     EMG     NCS Motor with or without F-Wave, 9-10 nerves (14549)     NEEDLE EMG 1 EXTREMITY (24387)        Primary Care Provider Office Phone # Fax #    Ryan Perera [phone]       [facility] [address] [facility] [address]        Equal Access to Services     JACKIE ALBERT : Hadii aad ku hadasho Soomaali, waaxda luqadaha, qaybta kaalmada adeegyada, neto kwok. So [facility] [phone].    ATENCIÓN: Si habla español, tiene a patten disposición servicios gratuitos de asistencia lingüística. Llame al [phone].    We comply with applicable federal civil rights laws and [place] laws. We do not discriminate on the basis of race, color, national origin, age, disability sex, sexual orientation or gender identity.            Thank you!     Thank you for choosing [facility]  for your care. Our goal is always to provide you with excellent care. Hearing back from our patients is one way we can continue to improve our services. Please take a few minutes to complete the written survey that you may receive in the mail after your visit with us. Thank you!             Your Updated Medication List - Protect others around you: Learn how to safely use, store and throw away your medicines at www.disposemymeds.org.          This list is accurate as of: 7/6/17  8:51 AM.  Always use your most recent med list.                   Brand Name Dispense Instructions for use Diagnosis    ALEVE PO           atenolol 25 MG tablet    TENORMIN    90 tablet    TAKE 1 TABLET BY MOUTH EVER Y DAY    Hypertension goal BP  (blood pressure) < 140/90       celecoxib 200 MG capsule    celeBREX    60 capsule    Take 1 capsule (200 mg) by mouth daily    Arthralgia, unspecified joint       MELATONIN PO

## 2017-07-18 ENCOUNTER — MYC MEDICAL ADVICE (OUTPATIENT)
Dept: FAMILY MEDICINE | Facility: OTHER | Age: 67
End: 2017-07-18

## 2017-07-20 NOTE — TELEPHONE ENCOUNTER
Spoke with patient, transferred him to specialty scheduling to set up appointment with Ortho regarding surgery.    Destini Plummer XRO/

## 2017-07-25 ENCOUNTER — OFFICE VISIT (OUTPATIENT)
Dept: ORTHOPEDICS | Facility: CLINIC | Age: 67
End: 2017-07-25
Payer: COMMERCIAL

## 2017-07-25 ENCOUNTER — TELEPHONE (OUTPATIENT)
Dept: ORTHOPEDICS | Facility: CLINIC | Age: 67
End: 2017-07-25

## 2017-07-25 VITALS — BODY MASS INDEX: 28.75 KG/M2 | WEIGHT: 212 LBS | TEMPERATURE: 97.8 F

## 2017-07-25 DIAGNOSIS — G56.01 CARPAL TUNNEL SYNDROME OF RIGHT WRIST: ICD-10-CM

## 2017-07-25 DIAGNOSIS — G56.02 CARPAL TUNNEL SYNDROME OF LEFT WRIST: Primary | ICD-10-CM

## 2017-07-25 PROCEDURE — 99203 OFFICE O/P NEW LOW 30 MIN: CPT | Performed by: ORTHOPAEDIC SURGERY

## 2017-07-25 ASSESSMENT — PAIN SCALES - GENERAL: PAINLEVEL: NO PAIN (0)

## 2017-07-25 NOTE — TELEPHONE ENCOUNTER
Surgery Scheduled    Date of Surgery 10/18/17 Time of Surgery   Procedure: Left carpal tunnel release  Hospital/Surgical Facility: [facility]  Surgeon: Dr. Townsend  Type of Anesthesia : Local  Pre-op na with   Post op:10/30/17 with Dr. Townsend        Surgery packet mailed to patient's home address. Patients instructed to arrive 1 hours prior to surgery. Patient understood and agrees to plan.    Serena Dong  Surgery Scheduler

## 2017-07-25 NOTE — LETTER
7/25/2017         RE: Dav Seay  [address]  [facility] [address]        Dear Colleague,    Thank you for referring your patient, Dav Seay, to the [facility]. Please see a copy of my visit note below.    ORTHOPEDIC CLINIC CONSULT      Dav Seay is a 67 year old male who is seen in consultation at the request of Dr. Perera.  History of Present illness:  Dav presents for evaluation of:   1.) Left Carpal Tunnel Syndrome     Onset:  December 2016    Symptoms brought on by Unknown.   Location:  Left wrist.    Character:  numbness and tingling in wrist and most the fingers except for the pinky  Progression of symptoms:  worse.    Previous similar pain: no .   Pain Level:  0/10.   Previous treatments:  support wrap., Brace at night which patient states helps while he is wearing it but he still has very obvious symptoms. Patient did undergo EMG testing  Currently on Blood thinners? No  Diagnosis of Diabetes? No      Past Medical History:   Diagnosis Date     Traumatic amputation of other finger(s) (complete) (partial), without mention of complication 1975    Amputated RIght 2nd finger   HTN as only medical issue  occasional insomnia    Past Surgical History:   Procedure Laterality Date     COLONOSCOPY  6/10/2013    Procedure: COLONOSCOPY;  Colonoscopy;  Surgeon: Wes Pablo MD;  Location: [facility]     HC COLONOSCOPY THRU STOMA W BIOPSY/CAUTERY TUMOR/POLYP/LESION  2003    hyperplastic polyp repeat in 2008.     HC REMOVAL OF TONSILS,<13 Y/O  5 years old    Tonsils <14y.o.     HERNIA REPAIR, INGUINAL RT/LT       JOINT REPLACEMENT, HIP RT/LT  12/19/11    Left total hip arthroplasty. [facility].       Home Medications:  Prior to Admission medications    Medication Sig Start Date End Date Taking? Authorizing Provider   atenolol (TENORMIN) 25 MG tablet TAKE 1 TABLET BY MOUTH EVER Y DAY 3/1/17  Yes Ryan Perera MD   celecoxib (CELEBREX) 200 MG capsule Take 1 capsule  (200 mg) by mouth daily 2/16/17  Yes Ryan Perera MD   MELATONIN PO    Yes Reported, Patient       Allergies   Allergen Reactions     No Known Drug Allergies        Social History     Occupational History     contractor      Social History Main Topics     Smoking status: Never Smoker     Smokeless tobacco: Never Used     Alcohol use Yes      Comment: occasional     Drug use: No     Sexual activity: Yes     Partners: Female    patient is a  but states he mostly drives. In the past he was a     Family History   Problem Relation Age of Onset     HEART DISEASE Mother      Tachycardia     OSTEOPOROSIS Mother      DIABETES Father      CANCER Father      skin, lymph and colon CA     Arthritis Brother      Anesthesia Reaction No family hx of        REVIEW OF SYSTEMS  General: negative for fever or fatigue     Psych:  No anxiety or depression    Ophthalmic:  Corrective lenses?  no    ENT:  Hearing difficulty? no    CV: negative for chest pain, venous insuffiency    Endocrine:  No diabetes     Urology:  negative kidney disease    Resp:  Normal respiratory effort      Skin:  Negative for cuts or redness      Musculoskeletal: as above    Neurologic:positive for numbness/tingling    Hematologic: negative for bleeding disorder, no use of prescription anticoagulants     Physical Exam:    Vitals: Temp 97.8  F (36.6  C) (Temporal)  Wt 96.2 kg (212 lb)  BMI 28.75 kg/m2  BMI= Body mass index is 28.75 kg/(m^2).    GENERAL APPEARANCE:  Healthy, alert, no distress    SKIN:  No suspicious lesions or rashes    NEURO: Normal strength and tone, mentation intact and speech normal    PSYCH:  Mentation appears normal and affect normal/bright    RESPIRATORY:No respiratory distress.    EYES: No Conjunctivitis    Cardiovascular:  No edema      GAIT:na    JOINT/EXTREMITIES:  Left wrist:  No visible atrophy  Patient with positive Tinel's into the second third and fourth fingers    EMG is positive for severe carpal tunnel  on the left and moderate on the right    Impression:      ICD-10-CM    1. Carpal tunnel syndrome of left wrist G56.02    2. Carpal tunnel syndrome of right wrist G56.01      Patient with bilateral carpal tunnel but patient states he is not able to tolerate the symptoms of his left wrist with the numbness and tingling.  EMG confirmed diagnosis    Plan:  Patient is advised of the above findings.  With the severity of patient's carpal tunnel symptoms he is advised that injection therapies would likely not be effective. Carpal tunnel release is recommended.  Patient is given details of carpal tunnel release as well as the recovery process.  Patient is told that increased severity can sometimes mean the recovery of the nerve may take longer.   Patient is advised not to return to any vibratory tools, power lifting/gripping over the next 4-6 weeks postoperatively.  Patient is also advised that the golf he likes to do maybe quite some time before his return. He is advised of the palmar sensitivity that can occur for several months postoperatively.  Patient is comfortable with local procedure without the use of Valium  Patient would like to proceed with scheduling though may consider addressing closer to the fall.  Patient follow-up  Approximately 10 days postoperatively.    Patient is evaluated with and plan developed in conjunction with Dr. Townsend    Scribed by  Katelin Carpenter PA-C   2017  3:15 PM        I attest I have seen and evaluated the patient.  I agree with above impression and plan.    Norris Townsend MD    Please schedule for surgery, pre op H&P, and post ops.      Patient Name:  Dav Seay ([id number]).  :  1950  Gender:  male  Patient Type:  Same Day Surgery  Surgeon:  Norris Townsend MD  Physician requests assist from:  None    Procedures:    Left carpal tunnel release   Diagnosis:     Carpal tunnel syndrome of left wrist  Anesthesia:  local  Time needed:  30 minutes    [facility] Discussed:   Not Applicable    Post op 1:                  Again, thank you for allowing me to participate in the care of your patient.        Sincerely,        Norris Townsend MD

## 2017-07-25 NOTE — PROGRESS NOTES
ORTHOPEDIC CLINIC CONSULT      Dav Seay is a 67 year old male who is seen in consultation at the request of Dr. Perera.  History of Present illness:  Dav presents for evaluation of:   1.) Left Carpal Tunnel Syndrome     Onset:  December 2016    Symptoms brought on by Unknown.   Location:  Left wrist.    Character:  numbness and tingling in wrist and most the fingers except for the pinky  Progression of symptoms:  worse.    Previous similar pain: no .   Pain Level:  0/10.   Previous treatments:  support wrap., Brace at night which patient states helps while he is wearing it but he still has very obvious symptoms. Patient did undergo EMG testing  Currently on Blood thinners? No  Diagnosis of Diabetes? No      Past Medical History:   Diagnosis Date     Traumatic amputation of other finger(s) (complete) (partial), without mention of complication 1975    Amputated RIght 2nd finger   HTN as only medical issue  occasional insomnia    Past Surgical History:   Procedure Laterality Date     COLONOSCOPY  6/10/2013    Procedure: COLONOSCOPY;  Colonoscopy;  Surgeon: Wes Pablo MD;  Location: [facility]     HC COLONOSCOPY THRU STOMA W BIOPSY/CAUTERY TUMOR/POLYP/LESION  2003    hyperplastic polyp repeat in 2008.     HC REMOVAL OF TONSILS,<15 Y/O  5 years old    Tonsils <14y.o.     HERNIA REPAIR, INGUINAL RT/LT       JOINT REPLACEMENT, HIP RT/LT  12/19/11    Left total hip arthroplasty. [facility].       Home Medications:  Prior to Admission medications    Medication Sig Start Date End Date Taking? Authorizing Provider   atenolol (TENORMIN) 25 MG tablet TAKE 1 TABLET BY MOUTH EVER Y DAY 3/1/17  Yes Ryan Perera MD   celecoxib (CELEBREX) 200 MG capsule Take 1 capsule (200 mg) by mouth daily 2/16/17  Yes Ryan Perera MD   MELATONIN PO    Yes Reported, Patient       Allergies   Allergen Reactions     No Known Drug Allergies        Social History     Occupational History     contractor      Social  History Main Topics     Smoking status: Never Smoker     Smokeless tobacco: Never Used     Alcohol use Yes      Comment: occasional     Drug use: No     Sexual activity: Yes     Partners: Female    patient is a  but states he mostly drives. In the past he was a     Family History   Problem Relation Age of Onset     HEART DISEASE Mother      Tachycardia     OSTEOPOROSIS Mother      DIABETES Father      CANCER Father      skin, lymph and colon CA     Arthritis Brother      Anesthesia Reaction No family hx of        REVIEW OF SYSTEMS  General: negative for fever or fatigue     Psych:  No anxiety or depression    Ophthalmic:  Corrective lenses?  no    ENT:  Hearing difficulty? no    CV: negative for chest pain, venous insuffiency    Endocrine:  No diabetes     Urology:  negative kidney disease    Resp:  Normal respiratory effort      Skin:  Negative for cuts or redness      Musculoskeletal: as above    Neurologic:positive for numbness/tingling    Hematologic: negative for bleeding disorder, no use of prescription anticoagulants     Physical Exam:    Vitals: Temp 97.8  F (36.6  C) (Temporal)  Wt 96.2 kg (212 lb)  BMI 28.75 kg/m2  BMI= Body mass index is 28.75 kg/(m^2).    GENERAL APPEARANCE:  Healthy, alert, no distress    SKIN:  No suspicious lesions or rashes    NEURO: Normal strength and tone, mentation intact and speech normal    PSYCH:  Mentation appears normal and affect normal/bright    RESPIRATORY:No respiratory distress.    EYES: No Conjunctivitis    Cardiovascular:  No edema      GAIT:na    JOINT/EXTREMITIES:  Left wrist:  No visible atrophy  Patient with positive Tinel's into the second third and fourth fingers    EMG is positive for severe carpal tunnel on the left and moderate on the right    Impression:      ICD-10-CM    1. Carpal tunnel syndrome of left wrist G56.02    2. Carpal tunnel syndrome of right wrist G56.01      Patient with bilateral carpal tunnel but patient states he  is not able to tolerate the symptoms of his left wrist with the numbness and tingling.  EMG confirmed diagnosis    Plan:  Patient is advised of the above findings.  With the severity of patient's carpal tunnel symptoms he is advised that injection therapies would likely not be effective. Carpal tunnel release is recommended.  Patient is given details of carpal tunnel release as well as the recovery process.  Patient is told that increased severity can sometimes mean the recovery of the nerve may take longer.   Patient is advised not to return to any vibratory tools, power lifting/gripping over the next 4-6 weeks postoperatively.  Patient is also advised that the golf he likes to do maybe quite some time before his return. He is advised of the palmar sensitivity that can occur for several months postoperatively.  Patient is comfortable with local procedure without the use of Valium  Patient would like to proceed with scheduling though may consider addressing closer to the fall.  Patient follow-up  Approximately 10 days postoperatively.    Patient is evaluated with and plan developed in conjunction with Dr. Townsend    Scribed by  Katelin Carpenter PA-C   2017  3:15 PM        I attest I have seen and evaluated the patient.  I agree with above impression and plan.    Norris Townsend MD    Please schedule for surgery, pre op H&P, and post ops.      Patient Name:  Dav Seay ([id number]).  :  1950  Gender:  male  Patient Type:  Same Day Surgery  Surgeon:  Norris Townsend MD  Physician requests assist from:  None    Procedures:    Left carpal tunnel release   Diagnosis:     Carpal tunnel syndrome of left wrist  Anesthesia:  local  Time needed:  30 minutes    [facility] Discussed:  Not Applicable    Post op 1:

## 2017-07-25 NOTE — NURSING NOTE
Chief Complaint   Patient presents with     Consult       Initial Temp 97.8  F (36.6  C) (Temporal)  Wt 96.2 kg (212 lb)  BMI 28.75 kg/m2 Estimated body mass index is 28.75 kg/(m^2) as calculated from the following:    Height as of 4/8/17: 1.829 m (6').    Weight as of this encounter: 96.2 kg (212 lb).  Medication Reconciliation: complete   FLOR Siu

## 2017-09-03 DIAGNOSIS — M25.50 ARTHRALGIA, UNSPECIFIED JOINT: ICD-10-CM

## 2017-09-05 RX ORDER — CELECOXIB 200 MG/1
CAPSULE ORAL
Qty: 90 CAPSULE | Refills: 1 | Status: SHIPPED | OUTPATIENT
Start: 2017-09-05 | End: 2018-02-08

## 2017-09-05 NOTE — TELEPHONE ENCOUNTER
Routing refill request to provider for review/approval because:  Warning message came up. Will not let RN approve. Will have PCP review.     Linda Dugan RN  [facility]

## 2017-09-05 NOTE — TELEPHONE ENCOUNTER
Celebrex      Last Written Prescription Date: 2/16/17  Last Quantity: 60, # refills: 3  Last Office Visit with [facility] or [facility] prescribing provider: 6/23/17  Next 5 appointments (look out 90 days)     Oct 30, 2017  1:00 PM CDT   [facility]    [address] [place] [address] [phone]                   Creatinine   Date Value Ref Range Status   03/16/2017 1.3 0.5 - 1.5 mg/dL Final     Lab Results   Component Value Date    AST 29 03/16/2017     Lab Results   Component Value Date    ALT 41 03/16/2017     BP Readings from Last 3 Encounters:   06/23/17 130/74   06/11/17 135/79   06/05/17 130/80

## 2017-10-18 ENCOUNTER — HOSPITAL ENCOUNTER (OUTPATIENT)
Facility: CLINIC | Age: 67
Discharge: HOME | End: 2017-10-18
Attending: ORTHOPAEDIC SURGERY | Admitting: ORTHOPAEDIC SURGERY
Payer: MEDICARE

## 2017-10-18 ENCOUNTER — SURGERY (OUTPATIENT)
Age: 67
End: 2017-10-18

## 2017-10-18 VITALS
RESPIRATION RATE: 16 BRPM | BODY MASS INDEX: 28.44 KG/M2 | OXYGEN SATURATION: 95 % | HEIGHT: 72 IN | TEMPERATURE: 97.8 F | SYSTOLIC BLOOD PRESSURE: 163 MMHG | DIASTOLIC BLOOD PRESSURE: 101 MMHG | WEIGHT: 210 LBS

## 2017-10-18 DIAGNOSIS — G56.02 CARPAL TUNNEL SYNDROME OF LEFT WRIST: Primary | ICD-10-CM

## 2017-10-18 PROCEDURE — 27210794 ZZH OR GENERAL SUPPLY STERILE: Performed by: ORTHOPAEDIC SURGERY

## 2017-10-18 PROCEDURE — 71000027 ZZH RECOVERY PHASE 2 EACH 15 MINS: Performed by: ORTHOPAEDIC SURGERY

## 2017-10-18 PROCEDURE — 36000050 ZZH SURGERY LEVEL 2 1ST 30 MIN: Performed by: ORTHOPAEDIC SURGERY

## 2017-10-18 PROCEDURE — 40000306 ZZH STATISTIC PRE PROC ASSESS II: Performed by: ORTHOPAEDIC SURGERY

## 2017-10-18 PROCEDURE — 36000052 ZZH SURGERY LEVEL 2 EA 15 ADDTL MIN: Performed by: ORTHOPAEDIC SURGERY

## 2017-10-18 PROCEDURE — 25000125 ZZHC RX 250: Performed by: ORTHOPAEDIC SURGERY

## 2017-10-18 PROCEDURE — 64721 CARPAL TUNNEL SURGERY: CPT | Mod: LT | Performed by: ORTHOPAEDIC SURGERY

## 2017-10-18 PROCEDURE — S0020 INJECTION, BUPIVICAINE HYDRO: HCPCS | Performed by: ORTHOPAEDIC SURGERY

## 2017-10-18 RX ORDER — BUPIVACAINE HYDROCHLORIDE 5 MG/ML
INJECTION, SOLUTION PERINEURAL PRN
Status: DISCONTINUED | OUTPATIENT
Start: 2017-10-18 | End: 2017-10-18 | Stop reason: HOSPADM

## 2017-10-18 RX ORDER — LIDOCAINE HYDROCHLORIDE 10 MG/ML
INJECTION, SOLUTION INFILTRATION; PERINEURAL PRN
Status: DISCONTINUED | OUTPATIENT
Start: 2017-10-18 | End: 2017-10-18 | Stop reason: HOSPADM

## 2017-10-18 RX ORDER — OXYCODONE HYDROCHLORIDE 5 MG/1
5-10 TABLET ORAL
Status: DISCONTINUED | OUTPATIENT
Start: 2017-10-18 | End: 2017-10-18 | Stop reason: HOSPADM

## 2017-10-18 RX ORDER — ETHYL CHLORIDE 100 %
AEROSOL, SPRAY (ML) TOPICAL PRN
Status: DISCONTINUED | OUTPATIENT
Start: 2017-10-18 | End: 2017-10-18 | Stop reason: HOSPADM

## 2017-10-18 RX ORDER — OXYCODONE HYDROCHLORIDE 5 MG/1
5-10 TABLET ORAL
Qty: 30 TABLET | Refills: 0 | Status: SHIPPED | OUTPATIENT
Start: 2017-10-18 | End: 2017-10-30

## 2017-10-18 RX ORDER — AMOXICILLIN 250 MG
1-2 CAPSULE ORAL 2 TIMES DAILY
Qty: 30 TABLET | Refills: 0 | Status: SHIPPED | OUTPATIENT
Start: 2017-10-18 | End: 2017-10-30

## 2017-10-18 RX ORDER — ONDANSETRON 4 MG/1
4 TABLET, ORALLY DISINTEGRATING ORAL
Status: DISCONTINUED | OUTPATIENT
Start: 2017-10-18 | End: 2017-10-18 | Stop reason: HOSPADM

## 2017-10-18 RX ADMIN — LIDOCAINE HYDROCHLORIDE 9.5 ML: 10 INJECTION, SOLUTION INFILTRATION; PERINEURAL at 15:05

## 2017-10-18 RX ADMIN — Medication 1 SPRAY: at 14:44

## 2017-10-18 RX ADMIN — BUPIVACAINE HYDROCHLORIDE 9.5 ML: 5 INJECTION, SOLUTION PERINEURAL at 15:05

## 2017-10-18 NOTE — IP AVS SNAPSHOT
MRN:[id number]                      After Visit Summary   10/18/2017    Dav Seay    MRN: [id number]           Thank you!     Thank you for choosing [facility] for your care. Our goal is always to provide you with excellent care. Hearing back from our patients is one way we can continue to improve our services. Please take a few minutes to complete the written survey that you may receive in the mail after you visit with us. Thank you!        Patient Information     Date Of Birth          1950        About your hospital stay     You were admitted on:  October 18, 2017 You last received care in the:  [facility] Phase [facility]    You were discharged on:  October 18, 2017       Who to Call     For medical emergencies, please call 911.  For non-urgent questions about your medical care, please call your primary care provider or clinic, [phone]  For questions related to your surgery, please call your surgery clinic        Attending Provider     Provider Specialty    Norris Townsend MD Orthopedics       Primary Care Provider Office Phone # Fax #    Ryan Perera [phone]      After Care Instructions      Diet as Tolerated       Return to diet before surgery, unless instructed otherwise.            Discharge Instructions       Review outpatient procedure discharge instructions with patient as directed by Provider            Ice to affected area       Ice pack to surgical site every 15 minutes per hour for 24 hours            No Dressing Change       No dressing change until follow up appointment.            No driving or operating machinery        until the day after procedure            No weight bearing           Return to clinic       Return to clinic on 10/30/17 at 1pm at [facility] with Dr. Townsend at that time we will not need xrays.                  Your next 10 appointments already scheduled     Oct 30, 2017  1:00 PM CDT   [facility]  [facility]    [address] [address] [phone]              Further instructions from your care team       [facility] Orthopedic Discharge Instructions For Carpal Tunnel Surgery    Call the Bone and Joint Service Line for after-surgery issues:    [phone]  Pain Control: New home prescriptions Oxycodone 5 mg tablet:  Take one or two tablets every 4-6 hours as needed for pain.        Take your pain medications as prescribed. These medications may make you sleepy. Do not drive, operate equipment, or drink alcohol when taking these.  You may take Tylenol (Generic name is acetaminophen) as directed on the bottle for additional relief or in place of the prescribed pain medications as your pain gets better.  Ibuprofen or Aleve can be used in supplement to the pain prescription and Tylenol if you need. If the medications cause a reaction such as nausea or skin rash, stop taking them and contact your doctor.  Please plan accordingly, pain medications will not be re-filled on the weekends or at night.  Call the office during the day if you need more medications.   Wound Care/Dressings Try to keep the bandage on until office recheck.  You must keep the bandage dry.    Do not immerse your hand in water until the sutures are removed.  Call the office if you have any problems with the dressings.   Activity You may use your hand for light activity.   I encourage you to move your fingers.    Do not lift anything heavy.  Keep your hand higher than your heart.  It will minimize swelling.   Diet Regular.   When to Call the Office Temperature greater than 101.5 degrees Fahrenheit.  Increasing pain not relieved by medicine, elevation and icing.  Any issues with your dressing.   Follow up Appointment This should have already been made for you.  If not call the office and make an appointment for 7-10 days  after surgery.          Pending Results     No orders found from 10/16/2017 to 10/19/2017.            Admission Information     Date & Time Provider Department Dept. Phone    10/18/2017 Norris Townsend MD [facility] Phase [phone]      Your Vitals Were     Blood Pressure Temperature Respirations Height Weight Pulse Oximetry    163/101 97.8  F (36.6  C) (Oral) 16 1.829 m (6') 95.3 kg (210 lb) 95%    BMI (Body Mass Index)                   28.48 kg/m2           MyChart Information     [facility] gives you secure access to your electronic health record. If you see a primary care provider, you can also send messages to your care team and make appointments. If you have questions, please call your primary care clinic.  If you do not have a primary care provider, please call [phone] and they will assist you.        Care EveryWhere ID     This is your Care EveryWhere ID. This could be used by other organizations to access your [facility] medical records  [id number]        Equal Access to Services     JACKIE ALBERT AH: Hadii aad ku hadasho Soanna, waaxda luqadaha, qaybta kaalmada adeegyada, neto green . So [facility] [phone].    ATENCIÓN: Si habla español, tiene a patten disposición servicios gratuitos de asistencia lingüística. Llame al [phone].    We comply with applicable federal civil rights laws and [place] laws. We do not discriminate on the basis of race, color, national origin, age, disability, sex, sexual orientation, or gender identity.               Review of your medicines      START taking        Dose / Directions    oxyCODONE 5 MG IR tablet   Commonly known as:  ROXICODONE   Used for:  Carpal tunnel syndrome of left wrist        Dose:  5-10 mg   Take 1-2 tablets (5-10 mg) by mouth every 3 hours as needed for pain or other (Moderate to Severe)   Quantity:  30 tablet   Refills:  0       senna-docusate 8.6-50 MG per tablet   Commonly known as:   SENOKOT-S;PERICOLACE   Used for:  Carpal tunnel syndrome of left wrist        Dose:  1-2 tablet   Take 1-2 tablets by mouth 2 times daily Take while on oral narcotics to prevent or treat constipation.   Quantity:  30 tablet   Refills:  0         CONTINUE these medicines which have NOT CHANGED        Dose / Directions    atenolol 25 MG tablet   Commonly known as:  TENORMIN   Used for:  Hypertension goal BP (blood pressure) < 140/90        TAKE 1 TABLET BY MOUTH EVER Y DAY   Quantity:  90 tablet   Refills:  3       celecoxib 200 MG capsule   Commonly known as:  celeBREX   Used for:  Arthralgia, unspecified joint        TAKE 1 CAPSULE BY MOUTH EVERY DAY   Quantity:  90 capsule   Refills:  1       MELATONIN PO        Refills:  0            Where to get your medicines      These medications were sent to [facility], [place] - [address]   [address], [facility] [address]     Phone:  [phone]     senna-docusate 8.6-50 MG per tablet         Some of these will need a paper prescription and others can be bought over the counter. Ask your nurse if you have questions.     Bring a paper prescription for each of these medications     oxyCODONE 5 MG IR tablet                Protect others around you: Learn how to safely use, store and throw away your medicines at www.disposemymeds.org.             Medication List: This is a list of all your medications and when to take them. Check marks below indicate your daily home schedule. Keep this list as a reference.      Medications           Morning Afternoon Evening Bedtime As Needed    atenolol 25 MG tablet   Commonly known as:  TENORMIN   TAKE 1 TABLET BY MOUTH EVER Y DAY                                celecoxib 200 MG capsule   Commonly known as:  celeBREX   TAKE 1 CAPSULE BY MOUTH EVERY DAY                                MELATONIN PO                                oxyCODONE 5 MG IR tablet   Commonly known as:  ROXICODONE   Take 1-2 tablets (5-10  mg) by mouth every 3 hours as needed for pain or other (Moderate to Severe)                                senna-docusate 8.6-50 MG per tablet   Commonly known as:  SENOKOT-S;PERICOLACE   Take 1-2 tablets by mouth 2 times daily Take while on oral narcotics to prevent or treat constipation.

## 2017-10-18 NOTE — IP AVS SNAPSHOT
[facility]    [address] [facility] [facility]     [place] [address]    Phone:  [phone]                                       After Visit Summary   10/18/2017    Dav Seay    MRN: [id number]           After Visit Summary Signature Page     I have received my discharge instructions, and my questions have been answered. I have discussed any challenges I see with this plan with the nurse or doctor.    ..........................................................................................................................................  Patient/Patient Representative Signature      ..........................................................................................................................................  Patient Representative Print Name and Relationship to Patient    ..................................................               ................................................  Date                                            Time    ..........................................................................................................................................  Reviewed by Signature/Title    ...................................................              ..............................................  Date                                                            Time

## 2017-10-18 NOTE — DISCHARGE INSTRUCTIONS
[facility] Orthopedic Discharge Instructions For Carpal Tunnel Surgery    Call the Bone and Joint Service Line for after-surgery issues:    [phone]  Pain Control: New home prescriptions Oxycodone 5 mg tablet:  Take one or two tablets every 4-6 hours as needed for pain.        Take your pain medications as prescribed. These medications may make you sleepy. Do not drive, operate equipment, or drink alcohol when taking these.  You may take Tylenol (Generic name is acetaminophen) as directed on the bottle for additional relief or in place of the prescribed pain medications as your pain gets better.  Ibuprofen or Aleve can be used in supplement to the pain prescription and Tylenol if you need. If the medications cause a reaction such as nausea or skin rash, stop taking them and contact your doctor.  Please plan accordingly, pain medications will not be re-filled on the weekends or at night.  Call the office during the day if you need more medications.   Wound Care/Dressings Try to keep the bandage on until office recheck.  You must keep the bandage dry.    Do not immerse your hand in water until the sutures are removed.  Call the office if you have any problems with the dressings.   Activity You may use your hand for light activity.   I encourage you to move your fingers.    Do not lift anything heavy.  Keep your hand higher than your heart.  It will minimize swelling.   Diet Regular.   When to Call the Office Temperature greater than 101.5 degrees Fahrenheit.  Increasing pain not relieved by medicine, elevation and icing.  Any issues with your dressing.   Follow up Appointment This should have already been made for you.  If not call the office and make an appointment for 7-10 days after surgery.

## 2017-10-18 NOTE — OP NOTE
OPERATIVE REPORT    DATE OF SERVICE:  10/18/2017      PREOPERATIVE DIAGNOSIS  LEFT carpal tunnel syndrome.      POSTOPERATIVE DIAGNOSIS  LEFT carpal tunnel syndrome.      NAME OF OPERATION  LEFT carpal tunnel release.     SURGEON  Norris Townsend MD     ANESTHESIA: Local    ESTIMATED BLOOD LOSS: less than 20cc    ANTIBIOTICS: none    FINDINGS: Severe Erythema.    Tourniquet time: 4 minutes at 250 mmHg.    Complications: None apparent.      INDICATIONS  Dav Seay is a 67 year old male with LEFT carpal tunnel syndrome, confirmed by EMG. The symptoms have been quite bothersome.  Conservative treatment has failed, including night splints, therapy, NSAIDs . Risks of surgery, including but not limited to: bleeding, infection, pain, scar, damage to adjacent structures (nerves, vessels), temporary vs permanent nerve damage, failure to relieve symptoms, recurrence of symptoms, need for further surgery, risks of anesthesia, and death were discussed. All questions were addressed to patient satisfaction. The patient elected to proceed with this surgery understanding the risks and perceived benefits. Informed consent was obtained for the procedure.       PROCEDURE IN DETAIL  The patient was identified in the preoperative holding area. The correct procedure and procedural site was confirmed with the patient. Consent was reviewed. The correct surgical site was marked with an indelible marker by the attending surgeon, Norris Townsend MD.  The patient was then taken to the operating room and placed on the operating table in the supine position.  Tourniquet was placed around the proximal arm. All bony prominences well padded. The limb was prepped and draped in the usual sterile fashion.   Local anesthetic using a combination of 0.25% Marcaine and 1% lidocaine was injected in the anticipated incision site.  The limb was exsanguinated and tourniquet inflated to 250 mmHg.    A curvilinear incision was made in the usual location from  the mid palmar region to the flexor crease of the wrist and then angle ulnar wise another centimeter.  The incision was taken down through the skin and subcutaneous tissue carefully to the level of the palmar fascia.   A self retaining retractor was placed.    The palmar fascia was then carefully incised, and the transverse carpal ligament was then exposed. The distal edge of the ligament was identified and a small hemostat was placed underneath and to the ulnar side of the canal.  The transverse carpal ligament was then incised from distal to proximal under direct vision while protecting underlying structures with the hemostat.    Proximally, we protected the superficial structures using a raggnel , and then completed the ligament incision using a tenotomy scissors under direct visualization.  The distal volar forearm fascia was also incised longitudinally for a segment of approximately 3cm so that the little finger of the surgeon could be passed into the distal forearm indicating the complete release of the median nerve.   The wound was copiously irrigated with normal saline and the tourniquet deflated.  Hemostasis was achieved with pressure and electrocautery.      The nerve was now inspected.     The wound was re irrigated.  Skin closure was accomplished with 3-0 nylon using a vertical.  Dressings of xeroform, sterile 4X4's and sterile Webril were applied followed by a 3 inch coban.     The patient was then transferred to the hospital cart and to the recovery area in stable condition. There were no apparent complications.    Postop plan will be non-wt bearing of right upper extremity.  Oral pain medications will be provided. Elevation of right upper extremity at all times to reduce swelling.  Dressing to be left in place until seen in the office in 10 days.  Call the office for any dressing concerns.    Norris Townsend MD

## 2017-10-30 ENCOUNTER — OFFICE VISIT (OUTPATIENT)
Dept: ORTHOPEDICS | Facility: CLINIC | Age: 67
End: 2017-10-30
Payer: COMMERCIAL

## 2017-10-30 VITALS — HEIGHT: 72 IN | TEMPERATURE: 97.3 F

## 2017-10-30 DIAGNOSIS — G56.02 CARPAL TUNNEL SYNDROME OF LEFT WRIST: Primary | ICD-10-CM

## 2017-10-30 PROCEDURE — 99024 POSTOP FOLLOW-UP VISIT: CPT | Performed by: ORTHOPAEDIC SURGERY

## 2017-10-30 ASSESSMENT — PAIN SCALES - GENERAL: PAINLEVEL: NO PAIN (0)

## 2017-10-30 NOTE — LETTER
10/30/2017         RE: Dav Seay  [address]  [place] [address]        Dear Colleague,    Thank you for referring your patient, Dav Seay, to the [facility]. Please see a copy of my visit note below.    HISTORY OF PRESENT ILLNESS:    Dav Seay is a 67 year old male who is seen in follow up for   Chief Complaint   Patient presents with     RECHECK     Left Carpal Tunnel Release.  DOS: 10/18/17 (12 days postop)     Surgical Followup     PREOPERATIVE DIAGNOSIS: LEFT carpal tunnel syndrome. POSTOPERATIVE DIAGNOSIS: LEFT carpal tunnel syndrome. NAME OF OPERATION: LEFT carpal tunnel release.     Present symptoms: Although he still has some tingling in his fingertips is overall symptoms have improved quite nicely.  Treatments tried to this point: This is his first postsurgical visit.    PHYSICAL EXAM:  Temp 97.3  F (36.3  C) (Temporal)  Ht 1.829 m (6')  There is no height or weight on file to calculate BMI.       Physical Exam:  Vitals: Temp 97.3  F (36.3  C) (Temporal)  Ht 1.829 m (6')  BMI= There is no height or weight on file to calculate BMI.  Constitutional: healthy, alert and no acute distress   Psychiatric: mentation appears normal and affect normal/bright    JOINT/EXTREMITIES:  NEURO: no focal deficits  Affected extremity pulses are easily palpable.  SKIN: no excoriation or erythema. No signs of infection.    Sutures were removed.    Swelling: No unusual  Bruising: None noted  ROM: No limitations of range of motion to his digits.      IMAGING INTERPRETATION:  No x-rays obtained.     ASSESSMENT:    ICD-10-CM    1. Carpal tunnel syndrome of left wrist G56.02        Doing as expected.    PLAN:      Edema control reviewed.  Pain medication usage reviewed. No request for refill  Physical Therapy:  Home exercise program discussed  Return to Work:  He has artery return to work as a .  Short-term limitations were reviewed. Slow increase in activity and lifting was  explained.  Expectations in the short run and the long one were also reviewed.    He is encouraged to return if he has any questions whatsoever.    Return to clinic PRN    Norris Townsend MD            Again, thank you for allowing me to participate in the care of your patient.        Sincerely,        Norris Townsend MD

## 2017-10-30 NOTE — MR AVS SNAPSHOT
After Visit Summary   10/30/2017    Dav Seay    MRN: [id number]           Patient Information     Date Of Birth          1950        Visit Information        Provider Department      10/30/2017 1:00 PM Norris Townsend MD [facility]        Today's Diagnoses     Carpal tunnel syndrome of left wrist    -  1       Follow-ups after your visit        Who to contact     If you have questions or need follow up information about today's clinic visit or your schedule please contact [facility] directly at [phone].  Normal or non-critical lab and imaging results will be communicated to you by [employer], letter or phone within 4 business days after the clinic has received the results. If you do not hear from us within 7 days, please contact the clinic through [employer]ok[employer] or phone. If you have a critical or abnormal lab result, we will notify you by phone as soon as possible.  Submit refill requests through [employer] or call your pharmacy and they will forward the refill request to us. Please allow 3 business days for your refill to be completed.          Additional Information About Your Visit        MyChart Information     [employer] gives you secure access to your electronic health record. If you see a primary care provider, you can also send messages to your care team and make appointments. If you have questions, please call your primary care clinic.  If you do not have a primary care provider, please call [phone] and they will assist you.        Care EveryWhere ID     This is your Care EveryWhere ID. This could be used by other organizations to access your [place] medical records  [id number]        Your Vitals Were     Temperature Height                97.3  F (36.3  C) (Temporal) 1.829 m (6')           Blood Pressure from Last 3 Encounters:   10/18/17 (!) 163/101   06/23/17 130/74   06/11/17 135/79    Weight from Last 3 Encounters:   10/18/17 95.3 kg (210 lb)    07/25/17 96.2 kg (212 lb)   06/23/17 97.4 kg (214 lb 11.2 oz)              Today, you had the following     No orders found for display         Today's Medication Changes          These changes are accurate as of: 10/30/17  1:25 PM.  If you have any questions, ask your nurse or doctor.               Stop taking these medicines if you haven't already. Please contact your care team if you have questions.     oxyCODONE 5 MG IR tablet   Commonly known as:  ROXICODONE   Stopped by:  Norris Townsend MD           senna-docusate 8.6-50 MG per tablet   Commonly known as:  SENOKOT-S;PERICOLACE   Stopped by:  Norris Townsend MD                    Primary Care Provider Office Phone # Fax #    Ryan Perera [phone]       [address] [facility] [address]        Equal Access to Services     [facility]: Hadii rehan olivares hadasho Soanna, waaxda luqadaha, qaybta kaalmada adekrisyada, neto green . So [facility] [phone].    ATENCIÓN: Si habla español, tiene a patten disposición servicios gratuitos de asistencia lingüística. Llame al [phone].    We comply with applicable federal civil rights laws and [place] laws. We do not discriminate on the basis of race, color, national origin, age, disability, sex, sexual orientation, or gender identity.            Thank you!     Thank you for choosing [facility]  for your care. Our goal is always to provide you with excellent care. Hearing back from our patients is one way we can continue to improve our services. Please take a few minutes to complete the written survey that you may receive in the mail after your visit with us. Thank you!             Your Updated Medication List - Protect others around you: Learn how to safely use, store and throw away your medicines at www.disposemymeds.org.          This list is accurate as of: 10/30/17  1:25 PM.  Always use your most recent med list.                   Brand Name  Dispense Instructions for use Diagnosis    atenolol 25 MG tablet    TENORMIN    90 tablet    TAKE 1 TABLET BY MOUTH EVER Y DAY    Hypertension goal BP (blood pressure) < 140/90       celecoxib 200 MG capsule    celeBREX    90 capsule    TAKE 1 CAPSULE BY MOUTH EVERY DAY    Arthralgia, unspecified joint       MELATONIN PO

## 2017-10-30 NOTE — NURSING NOTE
Chief Complaint   Patient presents with     RECHECK     Left Carpal Tunnel Release.  DOS: 10/18/17 (12 days postop)     Surgical Followup     PREOPERATIVE DIAGNOSIS: LEFT carpal tunnel syndrome. POSTOPERATIVE DIAGNOSIS: LEFT carpal tunnel syndrome. NAME OF OPERATION: LEFT carpal tunnel release.       Initial Temp 97.3  F (36.3  C) (Temporal)  Ht 1.829 m (6') Estimated body mass index is 28.48 kg/(m^2) as calculated from the following:    Height as of 10/18/17: 1.829 m (6').    Weight as of 10/18/17: 95.3 kg (210 lb).  Medication Reconciliation: complete   FLOR Siu

## 2017-10-30 NOTE — PROGRESS NOTES
HISTORY OF PRESENT ILLNESS:    Dav Seay is a 67 year old male who is seen in follow up for   Chief Complaint   Patient presents with     RECHECK     Left Carpal Tunnel Release.  DOS: 10/18/17 (12 days postop)     Surgical Followup     PREOPERATIVE DIAGNOSIS: LEFT carpal tunnel syndrome. POSTOPERATIVE DIAGNOSIS: LEFT carpal tunnel syndrome. NAME OF OPERATION: LEFT carpal tunnel release.     Present symptoms: Although he still has some tingling in his fingertips is overall symptoms have improved quite nicely.  Treatments tried to this point: This is his first postsurgical visit.    PHYSICAL EXAM:  Temp 97.3  F (36.3  C) (Temporal)  Ht 1.829 m (6')  There is no height or weight on file to calculate BMI.       Physical Exam:  Vitals: Temp 97.3  F (36.3  C) (Temporal)  Ht 1.829 m (6')  BMI= There is no height or weight on file to calculate BMI.  Constitutional: healthy, alert and no acute distress   Psychiatric: mentation appears normal and affect normal/bright    JOINT/EXTREMITIES:  NEURO: no focal deficits  Affected extremity pulses are easily palpable.  SKIN: no excoriation or erythema. No signs of infection.    Sutures were removed.    Swelling: No unusual  Bruising: None noted  ROM: No limitations of range of motion to his digits.      IMAGING INTERPRETATION:  No x-rays obtained.     ASSESSMENT:    ICD-10-CM    1. Carpal tunnel syndrome of left wrist G56.02        Doing as expected.    PLAN:      Edema control reviewed.  Pain medication usage reviewed. No request for refill  Physical Therapy:  Home exercise program discussed  Return to Work:  He has artery return to work as a .  Short-term limitations were reviewed. Slow increase in activity and lifting was explained.  Expectations in the short run and the long one were also reviewed.    He is encouraged to return if he has any questions whatsoever.    Return to clinic PRN    Norris Townsend MD

## 2017-12-08 ENCOUNTER — OFFICE VISIT (OUTPATIENT)
Dept: FAMILY MEDICINE | Facility: OTHER | Age: 67
End: 2017-12-08
Payer: COMMERCIAL

## 2017-12-08 VITALS
HEART RATE: 56 BPM | DIASTOLIC BLOOD PRESSURE: 70 MMHG | BODY MASS INDEX: 29.25 KG/M2 | TEMPERATURE: 97.4 F | OXYGEN SATURATION: 95 % | SYSTOLIC BLOOD PRESSURE: 120 MMHG | WEIGHT: 215.7 LBS | RESPIRATION RATE: 16 BRPM

## 2017-12-08 DIAGNOSIS — I10 HYPERTENSION GOAL BP (BLOOD PRESSURE) < 140/90: ICD-10-CM

## 2017-12-08 DIAGNOSIS — M16.11 PRIMARY OSTEOARTHRITIS OF RIGHT HIP: ICD-10-CM

## 2017-12-08 DIAGNOSIS — Z01.818 PREOP GENERAL PHYSICAL EXAM: Primary | ICD-10-CM

## 2017-12-08 LAB
ANION GAP SERPL CALCULATED.3IONS-SCNC: 3 MMOL/L (ref 3–14)
BUN SERPL-MCNC: 25 MG/DL (ref 7–30)
CALCIUM SERPL-MCNC: 9.1 MG/DL (ref 8.5–10.1)
CHLORIDE SERPL-SCNC: 105 MMOL/L (ref 94–109)
CO2 SERPL-SCNC: 30 MMOL/L (ref 20–32)
CREAT SERPL-MCNC: 1.35 MG/DL (ref 0.66–1.25)
ERYTHROCYTE [DISTWIDTH] IN BLOOD BY AUTOMATED COUNT: 13.2 % (ref 10–15)
GFR SERPL CREATININE-BSD FRML MDRD: 53 ML/MIN/1.7M2
GLUCOSE SERPL-MCNC: 109 MG/DL (ref 70–99)
HCT VFR BLD AUTO: 45.9 % (ref 40–53)
HGB BLD-MCNC: 15.4 G/DL (ref 13.3–17.7)
MCH RBC QN AUTO: 31.2 PG (ref 26.5–33)
MCHC RBC AUTO-ENTMCNC: 33.6 G/DL (ref 31.5–36.5)
MCV RBC AUTO: 93 FL (ref 78–100)
PLATELET # BLD AUTO: 222 10E9/L (ref 150–450)
POTASSIUM SERPL-SCNC: 4.9 MMOL/L (ref 3.4–5.3)
RBC # BLD AUTO: 4.93 10E12/L (ref 4.4–5.9)
SODIUM SERPL-SCNC: 138 MMOL/L (ref 133–144)
WBC # BLD AUTO: 6.5 10E9/L (ref 4–11)

## 2017-12-08 PROCEDURE — 99214 OFFICE O/P EST MOD 30 MIN: CPT | Performed by: FAMILY MEDICINE

## 2017-12-08 PROCEDURE — 36415 COLL VENOUS BLD VENIPUNCTURE: CPT | Performed by: FAMILY MEDICINE

## 2017-12-08 PROCEDURE — 85027 COMPLETE CBC AUTOMATED: CPT | Performed by: FAMILY MEDICINE

## 2017-12-08 PROCEDURE — 80048 BASIC METABOLIC PNL TOTAL CA: CPT | Performed by: FAMILY MEDICINE

## 2017-12-08 PROCEDURE — 93000 ELECTROCARDIOGRAM COMPLETE: CPT | Performed by: FAMILY MEDICINE

## 2017-12-08 ASSESSMENT — PAIN SCALES - GENERAL: PAINLEVEL: NO PAIN (0)

## 2017-12-08 NOTE — PATIENT INSTRUCTIONS
Before Your Surgery      Call your surgeon if there is any change in your health. This includes signs of a cold or flu (such as a sore throat, runny nose, cough, rash or fever).    Do not smoke, drink alcohol or take over the counter medicine (unless your surgeon or primary care doctor tells you to) for the 24 hours before and after surgery.    If you take prescribed drugs: Follow your doctor s orders about which medicines to take and which to stop until after surgery.    Eating and drinking prior to surgery: follow the instructions from your surgeon    Take a shower or bath the night before surgery. Use the soap your surgeon gave you to gently clean your skin. If you do not have soap from your surgeon, use your regular soap. Do not shave or scrub the surgery site.  Wear clean pajamas and have clean sheets on your bed.       --Patient is to take all scheduled medications on the day of surgery EXCEPT for modifications listed below.    Anticoagulant or Antiplatelet Medication Use  NSAIDS: Celecoxib (Celebrex):    Stop 2-3 days prior to surgery

## 2017-12-08 NOTE — NURSING NOTE
Chief Complaint   Patient presents with     Pre-Op Exam       Initial /70 (BP Location: Left arm, Patient Position: Chair, Cuff Size: Adult Large)  Pulse 56  Temp 97.4  F (36.3  C) (Temporal)  Resp 16  Wt 215 lb 11.2 oz (97.8 kg)  SpO2 95%  BMI 29.25 kg/m2 Estimated body mass index is 29.25 kg/(m^2) as calculated from the following:    Height as of 10/30/17: 6' (1.829 m).    Weight as of this encounter: 215 lb 11.2 oz (97.8 kg).  Medication Reconciliation: complete     Zoe Mo MA 12/8/2017  9:08 AM

## 2017-12-08 NOTE — PROGRESS NOTES
[facility]  [address] [facility] [address]  [phone]  Dept: [phone]    PRE-OP EVALUATION:  Today's date: 2017    Dav Seay (: 1950) presents for pre-operative evaluation assessment as requested by Dr. Sánchez.  He requires evaluation and anesthesia risk assessment prior to undergoing surgery/procedure for treatment of Hip Replacement Right .  Proposed procedure: Total Arthroplasty Right    Date of Surgery/ Procedure: 2017  Time of Surgery/ Procedure: 8:00am  Hospital/Surgical Facility: [facility]  Fax number for surgical facility: [phone]  Primary Physician: Ryan Perera  Type of Anesthesia Anticipated: General    Patient has a Health Care Directive or Living Will:  YES     Preop Questions 2017   1.  Do you have a history of heart attack, stroke, stent, bypass or surgery on an artery in the head, neck, heart or legs? No   2.  Do you ever have any pain or discomfort in your chest? No   3.  Do you have a history of  Heart Failure? No   4.   Are you troubled by shortness of breath when:  walking on a level surface, or up a slight hill, or at night? No   5.  Do you currently have a cold, bronchitis or other respiratory infection? No   6.  Do you have a cough, shortness of breath, or wheezing? No   7.  Do you sometimes get pains in the calves of your legs when you walk? No   8. Do you or anyone in your family have previous history of blood clots? No   9.  Do you or does anyone in your family have a serious bleeding problem such as prolonged bleeding following surgeries or cuts? No   10. Have you ever had problems with anemia or been told to take iron pills? No   11. Have you had any abnormal blood loss such as black, tarry or bloody stools? No   12. Have you ever had a blood transfusion? No   13. Have you or any of your relatives ever had problems with anesthesia? No   14. Do you have sleep apnea, excessive snoring or daytime drowsiness? YES  - Snoring and daytime drowsiness   15. Do you have any prosthetic heart valves? No   16. Do you have prosthetic joints? YES - Left Hip           HPI:                                                      Brief HPI related to upcoming procedure: Dav Seay is a 67 year old male who presents with chronic right hip pain with primary osteoarthrosis. Failed conservative management.       HYPERTENSION - Patient has longstanding history of mod-severe HTN , currently denies any symptoms referable to elevated blood pressure. Specifically denies chest pain, palpitations, dyspnea, orthopnea, PND or peripheral edema. Blood pressure readings have been in normal range. Current medication regimen is as listed below. Patient denies any side effects of medication.                                                                                                                                                                                          .    MEDICAL HISTORY:                                                    Patient Active Problem List    Diagnosis Date Noted     Carpal tunnel syndrome of left wrist 07/25/2017     Priority: Medium     Carpal tunnel syndrome of right wrist 07/25/2017     Priority: Medium     Insomnia, transient 12/02/2013     Priority: Medium     Advance Care Planning 11/30/2012     Priority: Medium     Advance Care Planning 9/16/2015: Receipt of ACP document:  Received: Health Care Directive which was witnessed or notarized on 8/29/15.  Document not previously scanned.  Validation form completed and sent with document to be scanned.  Code Status needs to be updated to reflect choices in most recent ACP document. Confirmed/documented designated decision maker(s).  Added by Tara Davis RN, BSN, MA, Advance Care Planning Liaison.  Advance Care Planning 11/30/12: Patient states has Advance Directive and will bring in a copy to clinic. 11/30/2012  Henri/FLOR          Hypertension goal BP (blood  pressure) < 140/90 02/15/2012     Priority: Medium     CARDIOVASCULAR SCREENING; LDL GOAL LESS THAN 160 10/31/2010     Priority: Medium      Past Medical History:   Diagnosis Date     Traumatic amputation of other finger(s) (complete) (partial), without mention of complication 1975    Amputated RIght 2nd finger     Past Surgical History:   Procedure Laterality Date     COLONOSCOPY  6/10/2013    Procedure: COLONOSCOPY;  Colonoscopy;  Surgeon: Wes Pablo MD;  Location:  GI     HC COLONOSCOPY THRU STOMA W BIOPSY/CAUTERY TUMOR/POLYP/LESION  2003    hyperplastic polyp repeat in 2008.     HC REMOVAL OF TONSILS,<15 Y/O  5 years old    Tonsils <14y.o.     HERNIA REPAIR, INGUINAL RT/LT       JOINT REPLACEMENT, HIP RT/LT  12/19/11    Left total hip arthroplasty. [facility]     RELEASE CARPAL TUNNEL Left 10/18/2017    Procedure: RELEASE CARPAL TUNNEL;  Left Carpal Tunnel Release;  Surgeon: Norris Townsend MD;  Location:  OR     Current Outpatient Prescriptions   Medication Sig Dispense Refill     celecoxib (CELEBREX) 200 MG capsule TAKE 1 CAPSULE BY MOUTH EVERY DAY 90 capsule 1     atenolol (TENORMIN) 25 MG tablet TAKE 1 TABLET BY MOUTH EVER Y DAY 90 tablet 3     MELATONIN PO        OTC products: None, except as noted above    Allergies   Allergen Reactions     No Known Drug Allergies       Latex Allergy: NO    Social History   Substance Use Topics     Smoking status: Never Smoker     Smokeless tobacco: Never Used     Alcohol use Yes      Comment: occasional     History   Drug Use No       REVIEW OF SYSTEMS:                                                    Constitutional, neuro, ENT, endocrine, pulmonary, cardiac, gastrointestinal, genitourinary, musculoskeletal, integument and psychiatric systems are negative, except as otherwise noted.      EXAM:                                                    /70 (BP Location: Left arm, Patient Position: Chair, Cuff Size: Adult Large)  Pulse 56   Temp 97.4  F (36.3  C) (Temporal)  Resp 16  Wt 215 lb 11.2 oz (97.8 kg)  SpO2 95%  BMI 29.25 kg/m2    GENERAL APPEARANCE: healthy, alert and no distress     EYES: EOMI,  PERRL     HENT: ear canals and TM's normal and nose and mouth without ulcers or lesions     NECK: no adenopathy, no asymmetry, masses, or scars and thyroid normal to palpation     RESP: lungs clear to auscultation - no rales, rhonchi or wheezes     CV: regular rates and rhythm, normal S1 S2, no S3 or S4 and no murmur, click or rub     ABDOMEN:  soft, nontender, no HSM or masses and bowel sounds normal     MS: EXTREMITIES: No palpable pain. ROM limited in right hip with external rotation. No lateral/gluteal pain. Good pulses. No edema. Normal reflexes.     SKIN: no suspicious lesions or rashes     NEURO: Normal strength and tone, sensory exam grossly normal, mentation intact and speech normal     PSYCH: mentation appears normal. and affect normal/bright     LYMPHATICS: No axillary, cervical, or supraclavicular nodes    DIAGNOSTICS:                                                      EKG: sinus bradycardia, normal axis, normal intervals, no acute ST/T changes c/w ischemia, no LVH by voltage criteria, unchanged from previous tracings  Labs Drawn and in Process:     Results for orders placed or performed in visit on 12/08/17   CBC with platelets   Result Value Ref Range    WBC 6.5 4.0 - 11.0 10e9/L    RBC Count 4.93 4.4 - 5.9 10e12/L    Hemoglobin 15.4 13.3 - 17.7 g/dL    Hematocrit 45.9 40.0 - 53.0 %    MCV 93 78 - 100 fl    MCH 31.2 26.5 - 33.0 pg    MCHC 33.6 31.5 - 36.5 g/dL    RDW 13.2 10.0 - 15.0 %    Platelet Count 222 150 - 450 10e9/L   Basic metabolic panel   Result Value Ref Range    Sodium 138 133 - 144 mmol/L    Potassium 4.9 3.4 - 5.3 mmol/L    Chloride 105 94 - 109 mmol/L    Carbon Dioxide 30 20 - 32 mmol/L    Anion Gap 3 3 - 14 mmol/L    Glucose 109 (H) 70 - 99 mg/dL    Urea Nitrogen 25 7 - 30 mg/dL    Creatinine 1.35 (H) 0.66 - 1.25  mg/dL    GFR Estimate 53 (L) >60 mL/min/1.7m2    GFR Estimate If Black 64 >60 mL/min/1.7m2    Calcium 9.1 8.5 - 10.1 mg/dL           Recent Labs   Lab Test 03/16/17 03/21/16  02/25/15   0827  01/10/12   1530  01/03/12   1545   12/14/11   0910   INR   --    --    --   2.08*  2.46*   < >   --    NA   --    --   138   --    --    --   142   POTASSIUM  5.1*  4.9  4.5   --    --    --   4.6   CR  1.3  1.3  1.21   --    --    --   1.07    < > = values in this interval not displayed.        IMPRESSION:                                                    Reason for surgery/procedure: Right hip osteoarthrosis/RTHA    The proposed surgical procedure is considered INTERMEDIATE risk.    REVISED CARDIAC RISK INDEX  The patient has the following serious cardiovascular risks for perioperative complications such as (MI, PE, VFib and 3  AV Block):  No serious cardiac risks  INTERPRETATION: 0 risks: Class I (very low risk - 0.4% complication rate)    The patient has the following additional risks for perioperative complications:  No identified additional risks      ICD-10-CM    1. Preop general physical exam Z01.818 CBC with platelets   2. Primary osteoarthritis of right hip M16.11    3. Hypertension goal BP (blood pressure) < 140/90 I10 Basic metabolic panel       RECOMMENDATIONS:                                                      --Consult hospital rounder / IM to assist post-op medical management    Cardiovascular Risk  Patient is already on a Beta Blocker. Continue Betablocker therapy after surgery, using Beta blocker order set as necessary for NPO status.      --Patient is to take all scheduled medications on the day of surgery EXCEPT for modifications listed below.    Anticoagulant or Antiplatelet Medication Use  NSAIDS: Celecoxib (Celebrex):    Stop 2-3 days prior to surgery          APPROVAL GIVEN to proceed with proposed procedure, without further diagnostic evaluation       Signed Electronically by: Ryan Perera  MD    Copy of this evaluation report is provided to requesting physician.    [place] Preop Guidelines

## 2017-12-08 NOTE — MR AVS SNAPSHOT
After Visit Summary   12/8/2017    Dav Seay    MRN: [id number]           Patient Information     Date Of Birth          1950        Visit Information        Provider Department      12/8/2017 8:40 AM Ryan Perera MD [facility]        Today's Diagnoses     Preop general physical exam    -  1    Primary osteoarthritis of right hip        Hypertension goal BP (blood pressure) < 140/90          Care Instructions      Before Your Surgery      Call your surgeon if there is any change in your health. This includes signs of a cold or flu (such as a sore throat, runny nose, cough, rash or fever).    Do not smoke, drink alcohol or take over the counter medicine (unless your surgeon or primary care doctor tells you to) for the 24 hours before and after surgery.    If you take prescribed drugs: Follow your doctor s orders about which medicines to take and which to stop until after surgery.    Eating and drinking prior to surgery: follow the instructions from your surgeon    Take a shower or bath the night before surgery. Use the soap your surgeon gave you to gently clean your skin. If you do not have soap from your surgeon, use your regular soap. Do not shave or scrub the surgery site.  Wear clean pajamas and have clean sheets on your bed.       --Patient is to take all scheduled medications on the day of surgery EXCEPT for modifications listed below.    Anticoagulant or Antiplatelet Medication Use  NSAIDS: Celecoxib (Celebrex):    Stop 2-3 days prior to surgery          Follow-ups after your visit        Who to contact     If you have questions or need follow up information about today's clinic visit or your schedule please contact [facility] directly at [phone].  Normal or non-critical lab and imaging results will be communicated to you by MyChart, letter or phone within 4 business days after the clinic has received the results. If you do not hear from us within 7  days, please contact the clinic through Etaphase or phone. If you have a critical or abnormal lab result, we will notify you by phone as soon as possible.  Submit refill requests through Etaphase or call your pharmacy and they will forward the refill request to us. Please allow 3 business days for your refill to be completed.          Additional Information About Your Visit        [employer] Information     Etaphase gives you secure access to your electronic health record. If you see a primary care provider, you can also send messages to your care team and make appointments. If you have questions, please call your primary care clinic.  If you do not have a primary care provider, please call [phone] and they will assist you.        Care EveryWhere ID     This is your Care EveryWhere ID. This could be used by other organizations to access your [place] medical records  [id number]        Your Vitals Were     Pulse Temperature Respirations Pulse Oximetry BMI (Body Mass Index)       56 97.4  F (36.3  C) (Temporal) 16 95% 29.25 kg/m2        Blood Pressure from Last 3 Encounters:   12/08/17 120/70   10/18/17 (!) 163/101   06/23/17 130/74    Weight from Last 3 Encounters:   12/08/17 215 lb 11.2 oz (97.8 kg)   10/18/17 210 lb (95.3 kg)   07/25/17 212 lb (96.2 kg)              We Performed the Following     Basic metabolic panel     CBC with platelets     EKG 12-lead complete w/read - Clinics        Primary Care Provider Office Phone # Fax #    Ryan Perera [phone]       [address] [facility] [address]        Equal Access to Services     [facility] : Hadii aad ku hadasho Soomaali, waaxda luqadaha, qaybta kaalmada neto parrish . So [facility] [phone].    ATENCIÓN: Si habla español, tiene a patten disposición servicios gratuitos de asistencia lingüística. Llame al [phone].    We comply with applicable federal civil rights laws and [place] laws. We do not  discriminate on the basis of race, color, national origin, age, disability, sex, sexual orientation, or gender identity.            Thank you!     Thank you for choosing [facility]  for your care. Our goal is always to provide you with excellent care. Hearing back from our patients is one way we can continue to improve our services. Please take a few minutes to complete the written survey that you may receive in the mail after your visit with us. Thank you!             Your Updated Medication List - Protect others around you: Learn how to safely use, store and throw away your medicines at www.disposemymeds.org.          This list is accurate as of: 12/8/17  9:40 AM.  Always use your most recent med list.                   Brand Name Dispense Instructions for use Diagnosis    atenolol 25 MG tablet    TENORMIN    90 tablet    TAKE 1 TABLET BY MOUTH EVER Y DAY    Hypertension goal BP (blood pressure) < 140/90       celecoxib 200 MG capsule    celeBREX    90 capsule    TAKE 1 CAPSULE BY MOUTH EVERY DAY    Arthralgia, unspecified joint       MELATONIN PO

## 2017-12-18 ENCOUNTER — TRANSFERRED RECORDS (OUTPATIENT)
Dept: HEALTH INFORMATION MANAGEMENT | Facility: CLINIC | Age: 67
End: 2017-12-18

## 2017-12-19 ENCOUNTER — TELEPHONE (OUTPATIENT)
Dept: FAMILY MEDICINE | Facility: OTHER | Age: 67
End: 2017-12-19

## 2017-12-19 DIAGNOSIS — I10 HYPERTENSION GOAL BP (BLOOD PRESSURE) < 140/90: Primary | ICD-10-CM

## 2017-12-19 NOTE — TELEPHONE ENCOUNTER
I have attempted to call the pt with the message below. I left message for pt to call back. I will call back another time. Bianca Rouse CMA ([facility])

## 2017-12-19 NOTE — TELEPHONE ENCOUNTER
Reason for Call: Request for an order or referral:    Order or referral being requested: Creatinine lab    Date needed: as soon as possible    Has the patient been seen by the PCP for this problem? YES    Additional comments: Dav has a f/u appt with the surgeon on 1/3/18 and they want this lab result before hand. Can you place the order and call the patient to let him know if he just needs a lab appt or if you need to see him?     Phone number Patient can be reached at:  Home number on file [phone] (home)    Best Time:  anytime    Can we leave a detailed message on this number?  YES    Call taken on 12/19/2017 at 11:39 AM by Omaira Baker

## 2017-12-19 NOTE — TELEPHONE ENCOUNTER
Order placed future. Please let patient know and schedule lab only appointment.  Electronically signed by Ryan Perera MD

## 2017-12-27 DIAGNOSIS — I10 HYPERTENSION GOAL BP (BLOOD PRESSURE) < 140/90: ICD-10-CM

## 2017-12-27 LAB
CREAT SERPL-MCNC: 1.32 MG/DL (ref 0.66–1.25)
GFR SERPL CREATININE-BSD FRML MDRD: 54 ML/MIN/1.7M2

## 2017-12-27 PROCEDURE — 36415 COLL VENOUS BLD VENIPUNCTURE: CPT | Performed by: FAMILY MEDICINE

## 2017-12-27 PROCEDURE — 82565 ASSAY OF CREATININE: CPT | Performed by: FAMILY MEDICINE

## 2018-01-03 ENCOUNTER — TRANSFERRED RECORDS (OUTPATIENT)
Dept: HEALTH INFORMATION MANAGEMENT | Facility: CLINIC | Age: 68
End: 2018-01-03

## 2018-01-08 ENCOUNTER — OFFICE VISIT (OUTPATIENT)
Dept: FAMILY MEDICINE | Facility: OTHER | Age: 68
End: 2018-01-08
Payer: COMMERCIAL

## 2018-01-08 VITALS
RESPIRATION RATE: 16 BRPM | BODY MASS INDEX: 30.26 KG/M2 | HEART RATE: 74 BPM | SYSTOLIC BLOOD PRESSURE: 124 MMHG | DIASTOLIC BLOOD PRESSURE: 70 MMHG | TEMPERATURE: 97.9 F | OXYGEN SATURATION: 97 % | WEIGHT: 223.1 LBS

## 2018-01-08 DIAGNOSIS — G47.01 INSOMNIA DUE TO MEDICAL CONDITION: ICD-10-CM

## 2018-01-08 DIAGNOSIS — I10 HYPERTENSION GOAL BP (BLOOD PRESSURE) < 140/90: Primary | ICD-10-CM

## 2018-01-08 DIAGNOSIS — N18.30 CKD (CHRONIC KIDNEY DISEASE) STAGE 3, GFR 30-59 ML/MIN (H): ICD-10-CM

## 2018-01-08 PROCEDURE — 99214 OFFICE O/P EST MOD 30 MIN: CPT | Performed by: FAMILY MEDICINE

## 2018-01-08 RX ORDER — ATORVASTATIN CALCIUM 20 MG/1
20 TABLET, FILM COATED ORAL DAILY
Qty: 90 TABLET | Refills: 3 | Status: SHIPPED | OUTPATIENT
Start: 2018-01-08 | End: 2018-12-15

## 2018-01-08 RX ORDER — ACETAMINOPHEN 500 MG
1000 TABLET ORAL
Status: ON HOLD | COMMUNITY
Start: 2017-12-19 | End: 2019-05-20

## 2018-01-08 RX ORDER — ZOLPIDEM TARTRATE 10 MG/1
10 TABLET ORAL
Qty: 30 TABLET | Refills: 1 | Status: SHIPPED | OUTPATIENT
Start: 2018-01-08 | End: 2019-04-05 | Stop reason: ALTCHOICE

## 2018-01-08 RX ORDER — LISINOPRIL 2.5 MG/1
2.5 TABLET ORAL DAILY
Qty: 30 TABLET | Refills: 1 | Status: SHIPPED | OUTPATIENT
Start: 2018-01-08 | End: 2018-02-08

## 2018-01-08 RX ORDER — METOPROLOL TARTRATE 25 MG/1
25 TABLET, FILM COATED ORAL 2 TIMES DAILY
Qty: 180 TABLET | Refills: 3 | Status: SHIPPED | OUTPATIENT
Start: 2018-01-08 | End: 2018-12-15

## 2018-01-08 RX ORDER — ASPIRIN 325 MG
325 TABLET ORAL
COMMUNITY
Start: 2017-12-19 | End: 2018-01-18

## 2018-01-08 RX ORDER — METOPROLOL TARTRATE 25 MG/1
25 TABLET, FILM COATED ORAL
COMMUNITY
Start: 2017-12-19 | End: 2018-01-08

## 2018-01-08 ASSESSMENT — PAIN SCALES - GENERAL: PAINLEVEL: MILD PAIN (2)

## 2018-01-08 NOTE — PROGRESS NOTES
SUBJECTIVE:   Dav Seay is a 67 year old male who presents to clinic today for the following health issues:    Follow up on Right Hip Replacement. Medications have been changed due to creatine level was high. Metoprolol Tartrate, Asprin and acetaminophen. He was switched from atenolol to metoprolol due to national shortage. He was noted to have elevated creatinine which [facility].    GFR Estimate   Date Value Ref Range Status   12/27/2017 54 (L) >60 mL/min/1.7m2 Final     Comment:     Non  GFR Calc   12/08/2017 53 (L) >60 mL/min/1.7m2 Final     Comment:     Non  GFR Calc   03/16/2017 56 >=60 ml/min/1.73m2 Final   03/21/2016 56 ml/min/1.73m2 Final   02/25/2015 61 >60 mL/min/1.7m2 Final     Comment:     Non  GFR Calc     GFR Estimate If Black   Date Value Ref Range Status   12/27/2017 66 >60 mL/min/1.7m2 Final     Comment:      GFR Calc   12/08/2017 64 >60 mL/min/1.7m2 Final     Comment:      GFR Calc   02/25/2015 73 >60 mL/min/1.7m2 Final     Comment:      GFR Calc   12/14/2011 86 >60 mL/min/1.7m2 Final   08/03/2010 70 >60 mL/min/1.7m2 Final     He has not been started on ACEI or ARB. He is not on statin.    LDL Cholesterol Calculated   Date Value Ref Range Status   03/21/2016 100.0 mg/dL Final   02/25/2015 100 0 - 129 mg/dL Final     Comment:     LDL Cholesterol is the primary guide to therapy: LDL-cholesterol goal in high   risk patients is <100 mg/dL and in very high risk patients is <70 mg/dL.       Insomnia  Onset: worse since surgery 12/18/2019    Description:   Time to fall asleep (sleep latency): 1 hour  Middle of night awakening:  YES  Early morning awakening:  YES    Progression of Symptoms:  worsening    Accompanying Signs & Symptoms:  Daytime sleepiness/napping: YES  Excessive snoring/apnea: YES  Restless legs: YES  Frequent urination: YES  Chronic pain:  no    History:  Prior  Insomnia: no    Precipitating factors:   New stressful situation: YES, total hip replacement  Caffeine intake: YES  OTC decongestants: no  Any new medications: YES- acetaminophen, aspirin, metoprolol tartrate    Alleviating factors:  Self medicating (alcohol, etc.):  no    Therapies Tried and outcome: Melatonin and Tylenol PM have not helped.           Hypertension Follow-up      Outpatient blood pressures are not being checked.    Low Salt Diet: no added salt    Chronic Kidney Disease Follow-up      Current NSAID use?  Yes:   celecoxib (Celebrex)             Problem list and histories reviewed & adjusted, as indicated.  Additional history: as documented    Patient Active Problem List   Diagnosis     CARDIOVASCULAR SCREENING; LDL GOAL LESS THAN 160     Hypertension goal BP (blood pressure) < 140/90     Advance Care Planning     Insomnia, transient     Carpal tunnel syndrome of left wrist     Carpal tunnel syndrome of right wrist     Past Surgical History:   Procedure Laterality Date     COLONOSCOPY  6/10/2013    Procedure: COLONOSCOPY;  Colonoscopy;  Surgeon: Wes Pablo MD;  Location:  GI     HC COLONOSCOPY THRU STOMA W BIOPSY/CAUTERY TUMOR/POLYP/LESION  2003    hyperplastic polyp repeat in 2008.     HC REMOVAL OF TONSILS,<13 Y/O  5 years old    Tonsils <14y.o.     HERNIA REPAIR, INGUINAL RT/LT       JOINT REPLACEMENT, HIP RT/LT  12/19/11    Left total hip arthroplasty. [facility]     JOINT REPLACEMENT, HIP RT/LT  12/18/2017    Right tota hip arthroplasty. [facility]     RELEASE CARPAL TUNNEL Left 10/18/2017    Procedure: RELEASE CARPAL TUNNEL;  Left Carpal Tunnel Release;  Surgeon: Norris Townsend MD;  Location:  OR       Social History   Substance Use Topics     Smoking status: Never Smoker     Smokeless tobacco: Never Used     Alcohol use Yes      Comment: occasional     Family History   Problem Relation Age of Onset     HEART DISEASE Mother      Tachycardia     OSTEOPOROSIS  Mother      DIABETES Father      CANCER Father      skin, lymph and colon CA     Arthritis Brother      Anesthesia Reaction No family hx of          Current Outpatient Prescriptions   Medication Sig Dispense Refill     acetaminophen (TYLENOL) 500 MG tablet Take 1,000 mg by mouth       aspirin 325 MG tablet Take 325 mg by mouth       metoprolol (LOPRESSOR) 25 MG tablet Take 25 mg by mouth       MELATONIN PO        celecoxib (CELEBREX) 200 MG capsule TAKE 1 CAPSULE BY MOUTH EVERY DAY (Patient not taking: Reported on 1/8/2018) 90 capsule 1     atenolol (TENORMIN) 25 MG tablet TAKE 1 TABLET BY MOUTH EVER Y DAY (Patient not taking: Reported on 1/8/2018) 90 tablet 3     Allergies   Allergen Reactions     No Known Drug Allergies      Recent Labs   Lab Test  12/27/17   0804  12/08/17   0941 03/16/17 03/21/16  02/25/15   0827   08/03/10   0807   LDL   --    --    --   100.0  100   --   130*   HDL   --    --    --   49  49   --   44   TRIG   --    --    --   75  94   --   116   ALT   --    --   41  31   --    --    --    CR  1.32*  1.35*  1.3  1.3  1.21   < >  1.28*   GFRESTIMATED  54*  53*  56  56  61   < >  58*   GFRESTBLACK  66  64   --    --   73   < >  70   POTASSIUM   --   4.9  5.1*  4.9  4.5   < >  4.3    < > = values in this interval not displayed.      BP Readings from Last 3 Encounters:   01/08/18 124/70   12/08/17 120/70   10/18/17 (!) 163/101    Wt Readings from Last 3 Encounters:   01/08/18 223 lb 1.6 oz (101.2 kg)   12/08/17 215 lb 11.2 oz (97.8 kg)   10/18/17 210 lb (95.3 kg)                  Labs reviewed in EPIC          Reviewed and updated as needed this visit by clinical staffAllergies  Meds  Problems       Reviewed and updated as needed this visit by Provider  Allergies  Meds  Problems         ROS:  Constitutional, HEENT, cardiovascular, pulmonary, gi and gu systems are negative, except as otherwise noted.      OBJECTIVE:   /70 (BP Location: Left arm, Patient Position: Chair, Cuff Size: Adult  Large)  Pulse 74  Temp 97.9  F (36.6  C) (Temporal)  Resp 16  Wt 223 lb 1.6 oz (101.2 kg)  SpO2 97%  BMI 30.26 kg/m2  Body mass index is 30.26 kg/(m^2).  GENERAL: healthy, alert and no distress  EYES: Eyes grossly normal to inspection, PERRL and conjunctivae and sclerae normal  NECK: no adenopathy, no asymmetry, masses, or scars and thyroid normal to palpation  RESP: lungs clear to auscultation - no rales, rhonchi or wheezes  CV: regular rate and rhythm, normal S1 S2, no S3 or S4, no murmur, click or rub, no peripheral edema and peripheral pulses strong  ABDOMEN: soft, nontender, no hepatosplenomegaly, no masses and bowel sounds normal  MS: no gross musculoskeletal defects noted, no edema    Diagnostic Test Results:  none     ASSESSMENT/PLAN:     Hypertension; controlled   Associated with the following complications:    CKD   Plan:  Medications:     ACE/ARB - lisinopril and statin  - metoprolol (LOPRESSOR) 25 MG tablet; Take 1 tablet (25 mg) by mouth 2 times daily  Dispense: 180 tablet; Refill: 3  - lisinopril (PRINIVIL/ZESTRIL) 2.5 MG tablet; Take 1 tablet (2.5 mg) by mouth daily  Dispense: 30 tablet; Refill: 1      Chronic Kidney Disease Stage 3 , stable     Associated with the following complications: None     Plan:  Labs Recheck renal function in 1 month  Meds  ACE - Lisinopril and CKD Care Packag      - lisinopril (PRINIVIL/ZESTRIL) 2.5 MG tablet; Take 1 tablet (2.5 mg) by mouth daily  Dispense: 30 tablet; Refill: 1  - atorvastatin (LIPITOR) 20 MG tablet; Take 1 tablet (20 mg) by mouth daily  Dispense: 90 tablet; Refill: 3    3. Insomnia due to medical condition  Acute due to post op hip pain. Will provider limited sleep aid.   - zolpidem (AMBIEN) 10 MG tablet; Take 1 tablet (10 mg) by mouth nightly as needed for sleep  Dispense: 30 tablet; Refill: 1    FUTURE LABS:       - Schedule a non-fasting blood draw 1 month  FUTURE APPOINTMENTS:       - Follow-up visit in 1 month recheck BP, CKD     Ryan Perera  MD  [facility]

## 2018-01-08 NOTE — MR AVS SNAPSHOT
After Visit Summary   1/8/2018    Dav Seay    MRN: [id number]           Patient Information     Date Of Birth          1950        Visit Information        Provider Department      1/8/2018 1:50 PM Ryan Perera MD [facility]        Today's Diagnoses     Hypertension goal BP (blood pressure) < 140/90    -  1    CKD (chronic kidney disease) stage 3, GFR 30-59 ml/min        Insomnia due to medical condition           Follow-ups after your visit        Follow-up notes from your care team     Return in about 1 month (around 2/8/2018) for Lab Work, BP Recheck.      Who to contact     If you have questions or need follow up information about today's clinic visit or your schedule please contact [facility] directly at [phone].  Normal or non-critical lab and imaging results will be communicated to you by MyChart, letter or phone within 4 business days after the clinic has received the results. If you do not hear from us within 7 days, please contact the clinic through MyChart or phone. If you have a critical or abnormal lab result, we will notify you by phone as soon as possible.  Submit refill requests through [employer] or call your pharmacy and they will forward the refill request to us. Please allow 3 business days for your refill to be completed.          Additional Information About Your Visit        MyChart Information     [employer] gives you secure access to your electronic health record. If you see a primary care provider, you can also send messages to your care team and make appointments. If you have questions, please call your primary care clinic.  If you do not have a primary care provider, please call [phone] and they will assist you.        Care EveryWhere ID     This is your Care EveryWhere ID. This could be used by other organizations to access your [place] medical records  [id number]        Your Vitals Were     Pulse Temperature Respirations  Pulse Oximetry BMI (Body Mass Index)       74 97.9  F (36.6  C) (Temporal) 16 97% 30.26 kg/m2        Blood Pressure from Last 3 Encounters:   01/08/18 124/70   12/08/17 120/70   10/18/17 (!) 163/101    Weight from Last 3 Encounters:   01/08/18 223 lb 1.6 oz (101.2 kg)   12/08/17 215 lb 11.2 oz (97.8 kg)   10/18/17 210 lb (95.3 kg)              Today, you had the following     No orders found for display         Today's Medication Changes          These changes are accurate as of: 1/8/18  2:35 PM.  If you have any questions, ask your nurse or doctor.               Start taking these medicines.        Dose/Directions    atorvastatin 20 MG tablet   Commonly known as:  LIPITOR   Used for:  CKD (chronic kidney disease) stage 3, GFR 30-59 ml/min   Started by:  Ryan Perera MD        Dose:  20 mg   Take 1 tablet (20 mg) by mouth daily   Quantity:  90 tablet   Refills:  3       lisinopril 2.5 MG tablet   Commonly known as:  PRINIVIL/Zestril   Used for:  Hypertension goal BP (blood pressure) < 140/90, CKD (chronic kidney disease) stage 3, GFR 30-59 ml/min   Started by:  Ryan Perera MD        Dose:  2.5 mg   Take 1 tablet (2.5 mg) by mouth daily   Quantity:  30 tablet   Refills:  1       zolpidem 10 MG tablet   Commonly known as:  AMBIEN   Used for:  Insomnia due to medical condition   Started by:  Ryan Perera MD        Dose:  10 mg   Take 1 tablet (10 mg) by mouth nightly as needed for sleep   Quantity:  30 tablet   Refills:  1         These medicines have changed or have updated prescriptions.        Dose/Directions    metoprolol 25 MG tablet   Commonly known as:  LOPRESSOR   This may have changed:  when to take this   Used for:  Hypertension goal BP (blood pressure) < 140/90   Changed by:  Ryan Perera MD        Dose:  25 mg   Take 1 tablet (25 mg) by mouth 2 times daily   Quantity:  180 tablet   Refills:  3         Stop taking these medicines if you haven't already. Please contact your care team if you have  questions.     atenolol 25 MG tablet   Commonly known as:  TENORMIN   Stopped by:  Ryan Perera MD                Where to get your medicines      These medications were sent to [id number] - [place], [place] - [address] [place] [address]  [address] [place] [id number]    Hours:  M-F 8:30-6:30; Sat 9-4; closed Sunday Phone:  [phone]     atorvastatin 20 MG tablet    lisinopril 2.5 MG tablet    metoprolol 25 MG tablet         Some of these will need a paper prescription and others can be bought over the counter.  Ask your nurse if you have questions.     Bring a paper prescription for each of these medications     zolpidem 10 MG tablet                Primary Care Provider Office Phone # Fax #    Ryan Perera [phone]       [address] [facility] [address]        Equal Access to Services     JACKIE ALBERT : Hadii rehan olivares hadasho Soomaali, waaxda luqadaha, qaybta kaalmada adeegyada, neto green . So [facility] [phone].    ATENCIÓN: Si habla español, tiene a patten disposición servicios gratuitos de asistencia lingüística. [facility] [phone].    We comply with applicable federal civil rights laws and [place] laws. We do not discriminate on the basis of race, color, national origin, age, disability, sex, sexual orientation, or gender identity.            Thank you!     Thank you for choosing [facility]  for your care. Our goal is always to provide you with excellent care. Hearing back from our patients is one way we can continue to improve our services. Please take a few minutes to complete the written survey that you may receive in the mail after your visit with us. Thank you!             Your Updated Medication List - Protect others around you: Learn how to safely use, store and throw away your medicines at www.disposemymeds.org.          This list is accurate as of: 1/8/18  2:35 PM.  Always use your most recent med list.                   Brand Name  Dispense Instructions for use Diagnosis    acetaminophen 500 MG tablet    TYLENOL     Take 1,000 mg by mouth        aspirin 325 MG tablet      Take 325 mg by mouth        atorvastatin 20 MG tablet    LIPITOR    90 tablet    Take 1 tablet (20 mg) by mouth daily    CKD (chronic kidney disease) stage 3, GFR 30-59 ml/min       celecoxib 200 MG capsule    celeBREX    90 capsule    TAKE 1 CAPSULE BY MOUTH EVERY DAY    Arthralgia, unspecified joint       lisinopril 2.5 MG tablet    PRINIVIL/Zestril    30 tablet    Take 1 tablet (2.5 mg) by mouth daily    Hypertension goal BP (blood pressure) < 140/90, CKD (chronic kidney disease) stage 3, GFR 30-59 ml/min       MELATONIN PO           metoprolol 25 MG tablet    LOPRESSOR    180 tablet    Take 1 tablet (25 mg) by mouth 2 times daily    Hypertension goal BP (blood pressure) < 140/90       zolpidem 10 MG tablet    AMBIEN    30 tablet    Take 1 tablet (10 mg) by mouth nightly as needed for sleep    Insomnia due to medical condition

## 2018-01-16 ENCOUNTER — DOCUMENTATION ONLY (OUTPATIENT)
Dept: VASCULAR SURGERY | Facility: CLINIC | Age: 68
End: 2018-01-16

## 2018-01-16 DIAGNOSIS — Z13.6 ENCOUNTER FOR ABDOMINAL AORTIC ANEURYSM (AAA) SCREENING: Primary | ICD-10-CM

## 2018-02-06 ENCOUNTER — TRANSFERRED RECORDS (OUTPATIENT)
Dept: HEALTH INFORMATION MANAGEMENT | Facility: CLINIC | Age: 68
End: 2018-02-06

## 2018-02-08 ENCOUNTER — OFFICE VISIT (OUTPATIENT)
Dept: FAMILY MEDICINE | Facility: OTHER | Age: 68
End: 2018-02-08
Payer: COMMERCIAL

## 2018-02-08 VITALS
WEIGHT: 217 LBS | SYSTOLIC BLOOD PRESSURE: 114 MMHG | DIASTOLIC BLOOD PRESSURE: 72 MMHG | RESPIRATION RATE: 16 BRPM | HEART RATE: 58 BPM | OXYGEN SATURATION: 99 % | BODY MASS INDEX: 29.43 KG/M2 | TEMPERATURE: 97.5 F

## 2018-02-08 DIAGNOSIS — M25.50 ARTHRALGIA, UNSPECIFIED JOINT: ICD-10-CM

## 2018-02-08 DIAGNOSIS — R35.1 NOCTURIA: Primary | ICD-10-CM

## 2018-02-08 DIAGNOSIS — I10 HYPERTENSION GOAL BP (BLOOD PRESSURE) < 140/90: ICD-10-CM

## 2018-02-08 DIAGNOSIS — N18.30 CKD (CHRONIC KIDNEY DISEASE) STAGE 3, GFR 30-59 ML/MIN (H): ICD-10-CM

## 2018-02-08 LAB
ALBUMIN UR-MCNC: NEGATIVE MG/DL
ANION GAP SERPL CALCULATED.3IONS-SCNC: 7 MMOL/L (ref 3–14)
APPEARANCE UR: CLEAR
BILIRUB UR QL STRIP: NEGATIVE
BUN SERPL-MCNC: 22 MG/DL (ref 7–30)
CALCIUM SERPL-MCNC: 9 MG/DL (ref 8.5–10.1)
CHLORIDE SERPL-SCNC: 107 MMOL/L (ref 94–109)
CO2 SERPL-SCNC: 26 MMOL/L (ref 20–32)
COLOR UR AUTO: YELLOW
CREAT SERPL-MCNC: 1.28 MG/DL (ref 0.66–1.25)
GFR SERPL CREATININE-BSD FRML MDRD: 56 ML/MIN/1.7M2
GLUCOSE SERPL-MCNC: 98 MG/DL (ref 70–99)
GLUCOSE UR STRIP-MCNC: NEGATIVE MG/DL
HGB UR QL STRIP: NEGATIVE
KETONES UR STRIP-MCNC: NEGATIVE MG/DL
LEUKOCYTE ESTERASE UR QL STRIP: NEGATIVE
NITRATE UR QL: NEGATIVE
PH UR STRIP: 6 PH (ref 5–7)
POTASSIUM SERPL-SCNC: 4.7 MMOL/L (ref 3.4–5.3)
PSA SERPL-ACNC: 0.66 UG/L (ref 0–4)
SODIUM SERPL-SCNC: 140 MMOL/L (ref 133–144)
SOURCE: NORMAL
SP GR UR STRIP: 1.02 (ref 1–1.03)
UROBILINOGEN UR STRIP-ACNC: 0.2 EU/DL (ref 0.2–1)

## 2018-02-08 PROCEDURE — 81003 URINALYSIS AUTO W/O SCOPE: CPT | Performed by: FAMILY MEDICINE

## 2018-02-08 PROCEDURE — 80048 BASIC METABOLIC PNL TOTAL CA: CPT | Performed by: FAMILY MEDICINE

## 2018-02-08 PROCEDURE — 36415 COLL VENOUS BLD VENIPUNCTURE: CPT | Performed by: FAMILY MEDICINE

## 2018-02-08 PROCEDURE — 99214 OFFICE O/P EST MOD 30 MIN: CPT | Performed by: FAMILY MEDICINE

## 2018-02-08 PROCEDURE — G0103 PSA SCREENING: HCPCS | Performed by: FAMILY MEDICINE

## 2018-02-08 RX ORDER — LISINOPRIL 2.5 MG/1
2.5 TABLET ORAL DAILY
Qty: 90 TABLET | Refills: 3 | Status: SHIPPED | OUTPATIENT
Start: 2018-02-08 | End: 2018-12-15

## 2018-02-08 RX ORDER — CELECOXIB 200 MG/1
CAPSULE ORAL
Qty: 90 CAPSULE | Refills: 1 | Status: SHIPPED | OUTPATIENT
Start: 2018-02-08 | End: 2018-06-18

## 2018-02-08 ASSESSMENT — PAIN SCALES - GENERAL: PAINLEVEL: NO PAIN (0)

## 2018-02-08 NOTE — PROGRESS NOTES
SUBJECTIVE:   Dav Seay is a 67 year old male who presents to clinic today for the following health issues:    Follow up on right hip replacement.     Medication Followup of Metoprolol Tartrate, Asprin    Taking Medication as prescribed: yes    Side Effects:  Going to the bathroom 4 times per night     Medication Helping Symptoms:  yes     Concern: Arthritis has been bothering and wondering if there is any other medication that could be taken for arthritis. He was off Celebrex for his SIOBHAN 6-8 weeks ago. It should be ok for him to restart Celebrex.      Genitourinary - Male  Onset: 6 weeks, after surgery    Description:   Dysuria (painful urination): no   Hematuria (blood in urine): no   Frequency: no   Are you urinating at night : YES- 3-4 times  Hesitancy (delay in urine): no   Retention (unable to empty): no   Decrease in urinary flow: no   Incontinence: no     Progression of Symptoms:  same    Accompanying Signs & Symptoms:  Fever: no   Back/Flank pain: no   Urethral discharge: no   Testicle lumps/masses/pain: no   Nausea and/or vomiting: no   Abdominal pain: no     History:   History of frequent UTI's: no   History of kidney stones: no   History of hernias: no   Personal or Family history of Prostate problems: no  Sexually active: YES    Precipitating factors:   Surgery, denies tom catheter.    Alleviating factors:  none      Problem list and histories reviewed & adjusted, as indicated.  Additional history: as documented    Patient Active Problem List   Diagnosis     CARDIOVASCULAR SCREENING; LDL GOAL LESS THAN 160     Hypertension goal BP (blood pressure) < 140/90     Advance Care Planning     Insomnia, transient     Carpal tunnel syndrome of left wrist     Carpal tunnel syndrome of right wrist     CKD (chronic kidney disease) stage 3, GFR 30-59 ml/min     Past Surgical History:   Procedure Laterality Date     COLONOSCOPY  6/10/2013    Procedure: COLONOSCOPY;  Colonoscopy;  Surgeon: Wes Pablo  MD Klever;  Location: [facility]     HC COLONOSCOPY THRU STOMA W BIOPSY/CAUTERY TUMOR/POLYP/LESION  2003    hyperplastic polyp repeat in 2008.     HC REMOVAL OF TONSILS,<13 Y/O  5 years old    Tonsils <14y.o.     HERNIA REPAIR, INGUINAL RT/LT       JOINT REPLACEMENT, HIP RT/LT  12/19/11    Left total hip arthroplasty. [facility]     JOINT REPLACEMENT, HIP RT/LT  12/18/2017    Right tota hip arthroplasty. [facility]     RELEASE CARPAL TUNNEL Left 10/18/2017    Procedure: RELEASE CARPAL TUNNEL;  Left Carpal Tunnel Release;  Surgeon: Norris Townsend MD;  Location: [facility]       Social History   Substance Use Topics     Smoking status: Never Smoker     Smokeless tobacco: Never Used     Alcohol use Yes      Comment: occasional     Family History   Problem Relation Age of Onset     HEART DISEASE Mother      Tachycardia     OSTEOPOROSIS Mother      DIABETES Father      CANCER Father      skin, lymph and colon CA     Arthritis Brother      Anesthesia Reaction No family hx of          Current Outpatient Prescriptions   Medication Sig Dispense Refill     ASPIRIN PO Take 81 mg by mouth       metoprolol (LOPRESSOR) 25 MG tablet Take 1 tablet (25 mg) by mouth 2 times daily 180 tablet 3     lisinopril (PRINIVIL/ZESTRIL) 2.5 MG tablet Take 1 tablet (2.5 mg) by mouth daily 30 tablet 1     atorvastatin (LIPITOR) 20 MG tablet Take 1 tablet (20 mg) by mouth daily 90 tablet 3     MELATONIN PO        acetaminophen (TYLENOL) 500 MG tablet Take 1,000 mg by mouth       zolpidem (AMBIEN) 10 MG tablet Take 1 tablet (10 mg) by mouth nightly as needed for sleep (Patient not taking: Reported on 2/8/2018) 30 tablet 1     celecoxib (CELEBREX) 200 MG capsule TAKE 1 CAPSULE BY MOUTH EVERY DAY (Patient not taking: Reported on 1/8/2018) 90 capsule 1     Allergies   Allergen Reactions     No Known Drug Allergies      Recent Labs   Lab Test  12/27/17   0804  12/08/17   0941 03/16/17 03/21/16  02/25/15   0827   08/03/10   0807   LDL   --     --    --   100.0  100   --   130*   HDL   --    --    --   49  49   --   44   TRIG   --    --    --   75  94   --   116   ALT   --    --   41  31   --    --    --    CR  1.32*  1.35*  1.3  1.3  1.21   < >  1.28*   GFRESTIMATED  54*  53*  56  56  61   < >  58*   GFRESTBLACK  66  64   --    --   73   < >  70   POTASSIUM   --   4.9  5.1*  4.9  4.5   < >  4.3    < > = values in this interval not displayed.      BP Readings from Last 3 Encounters:   02/08/18 114/72   01/08/18 124/70   12/08/17 120/70    Wt Readings from Last 3 Encounters:   02/08/18 217 lb (98.4 kg)   01/08/18 223 lb 1.6 oz (101.2 kg)   12/08/17 215 lb 11.2 oz (97.8 kg)                  Labs reviewed in EPIC    Reviewed and updated as needed this visit by clinical staff  Tobacco  Allergies  Meds  Problems  Med Hx  Surg Hx  Fam Hx  Soc Hx        Reviewed and updated as needed this visit by Provider  Allergies  Meds  Problems         ROS:  Constitutional, HEENT, cardiovascular, pulmonary, gi and gu systems are negative, except as otherwise noted.    OBJECTIVE:     /72 (BP Location: Right arm, Patient Position: Chair, Cuff Size: Adult Large)  Pulse 58  Temp 97.5  F (36.4  C) (Temporal)  Resp 16  Wt 217 lb (98.4 kg)  SpO2 99%  BMI 29.43 kg/m2  Body mass index is 29.43 kg/(m^2).  GENERAL: healthy, alert and no distress  HENT: ear canals and TM's normal, nose and mouth without ulcers or lesions  NECK: no adenopathy, no asymmetry, masses, or scars and thyroid normal to palpation  RESP: lungs clear to auscultation - no rales, rhonchi or wheezes  CV: regular rate and rhythm, normal S1 S2, no S3 or S4, no murmur, click or rub, no peripheral edema and peripheral pulses strong  ABDOMEN: soft, nontender, no hepatosplenomegaly, no masses and bowel sounds normal  MS: no gross musculoskeletal defects noted, no edema    Diagnostic Test Results:  No results found for this or any previous visit (from the past 24 hour(s)).    ASSESSMENT/PLAN:     1.  Hypertension goal BP (blood pressure) < 140/90  Chronic controlled. The current medical regimen is effective;  continue present plan and medications. Recheck Creatinine and GFR.  - lisinopril (PRINIVIL/ZESTRIL) 2.5 MG tablet; Take 1 tablet (2.5 mg) by mouth daily  Dispense: 90 tablet; Refill: 3  - Basic metabolic panel    2. CKD (chronic kidney disease) stage 3, GFR 30-59 ml/min  Chronic controlled. The current medical regimen is effective;  continue present plan and medications. Recheck Creatinine and GFR. Minimize NSAID use but will OK Celebrex for occasional use.  - lisinopril (PRINIVIL/ZESTRIL) 2.5 MG tablet; Take 1 tablet (2.5 mg) by mouth daily  Dispense: 90 tablet; Refill: 3  - Basic metabolic panel    3. Nocturia  Acute nocturia with out dysuria, urgency, frequency, weak stream or hesitancy. Suspect he had tom catheter intraoperative and may have local edema vs effect of medication for BP control during surgery..   - PSA, screen  - *UA reflex to Microscopic and Culture ([facility] (except [place] and [place])    4. Arthralgia, unspecified joint  Chronic. He has done well with Celebrex. He is now 6-8 weeks status post SIOBHAN. OK to restart but minimize use due to underlying CKD.  - celecoxib (CELEBREX) 200 MG capsule; TAKE 1 CAPSULE BY MOUTH EVERY DAY  Dispense: 90 capsule; Refill: 1    Work on weight loss  Regular exercise    Ryan Perera MD  [facility]

## 2018-02-08 NOTE — MR AVS SNAPSHOT
After Visit Summary   2/8/2018    Dav Seay    MRN: [id number]           Patient Information     Date Of Birth          1950        Visit Information        Provider Department      2/8/2018 8:00 AM Ryan Perera MD [facility]        Today's Diagnoses     Nocturia    -  1    Hypertension goal BP (blood pressure) < 140/90        CKD (chronic kidney disease) stage 3, GFR 30-59 ml/min        Arthralgia, unspecified joint           Follow-ups after your visit        Who to contact     If you have questions or need follow up information about today's clinic visit or your schedule please contact [facility] directly at [phone].  Normal or non-critical lab and imaging results will be communicated to you by MyChart, letter or phone within 4 business days after the clinic has received the results. If you do not hear from us within 7 days, please contact the clinic through [employer] or phone. If you have a critical or abnormal lab result, we will notify you by phone as soon as possible.  Submit refill requests through [employer] or call your pharmacy and they will forward the refill request to us. Please allow 3 business days for your refill to be completed.          Additional Information About Your Visit        MyChart Information     [employer] gives you secure access to your electronic health record. If you see a primary care provider, you can also send messages to your care team and make appointments. If you have questions, please call your primary care clinic.  If you do not have a primary care provider, please call [phone] and they will assist you.        Care EveryWhere ID     This is your Care EveryWhere ID. This could be used by other organizations to access your [facility] medical records  [id number]        Your Vitals Were     Pulse Temperature Respirations Pulse Oximetry BMI (Body Mass Index)       58 97.5  F (36.4  C) (Temporal) 16 99% 29.43 kg/m2         Blood Pressure from Last 3 Encounters:   02/08/18 114/72   01/08/18 124/70   12/08/17 120/70    Weight from Last 3 Encounters:   02/08/18 217 lb (98.4 kg)   01/08/18 223 lb 1.6 oz (101.2 kg)   12/08/17 215 lb 11.2 oz (97.8 kg)              We Performed the Following     *UA reflex to Microscopic and Culture ([facility]; [facility]; [facility]; [facility]; [place]; [place])     Basic metabolic panel     PSA, screen          Today's Medication Changes          These changes are accurate as of 2/8/18  8:29 AM.  If you have any questions, ask your nurse or doctor.               These medicines have changed or have updated prescriptions.        Dose/Directions    celecoxib 200 MG capsule   Commonly known as:  celeBREX   This may have changed:  See the new instructions.   Used for:  Arthralgia, unspecified joint   Changed by:  Ryan Perera MD        TAKE 1 CAPSULE BY MOUTH EVERY DAY   Quantity:  90 capsule   Refills:  1            Where to get your medicines      These medications were sent to [id number] - [place], [place] - [address] [place] [id number]    Hours:  M-F 8:30-6:30; Sat 9-4; closed Sunday Phone:  [phone]     celecoxib 200 MG capsule    lisinopril 2.5 MG tablet                Primary Care Provider Office Phone # Fax #    Ryan Perera [phone]       [address] [facility] [address]        Equal Access to Services     NOLBERTO ALBERT AH: Hadii aad ku hadasho Soomaali, waaxda luqadaha, qaybta kaalmada adeegyada, waxay kalyan kwok. So [facility] [phone].    ATENCIÓN: Si habla español, tiene a patten disposición servicios gratuitos de asistencia lingüística. Llame al [phone].    We comply with applicable federal civil rights laws and [place] laws. We do not discriminate on the basis of race, color, national origin, age, disability, sex, sexual orientation, or gender identity.            Thank you!      Thank you for choosing [facility]  for your care. Our goal is always to provide you with excellent care. Hearing back from our patients is one way we can continue to improve our services. Please take a few minutes to complete the written survey that you may receive in the mail after your visit with us. Thank you!             Your Updated Medication List - Protect others around you: Learn how to safely use, store and throw away your medicines at www.disposemymeds.org.          This list is accurate as of 2/8/18  8:29 AM.  Always use your most recent med list.                   Brand Name Dispense Instructions for use Diagnosis    acetaminophen 500 MG tablet    TYLENOL     Take 1,000 mg by mouth        ASPIRIN PO      Take 81 mg by mouth        atorvastatin 20 MG tablet    LIPITOR    90 tablet    Take 1 tablet (20 mg) by mouth daily    CKD (chronic kidney disease) stage 3, GFR 30-59 ml/min       celecoxib 200 MG capsule    celeBREX    90 capsule    TAKE 1 CAPSULE BY MOUTH EVERY DAY    Arthralgia, unspecified joint       lisinopril 2.5 MG tablet    PRINIVIL/Zestril    90 tablet    Take 1 tablet (2.5 mg) by mouth daily    Hypertension goal BP (blood pressure) < 140/90, CKD (chronic kidney disease) stage 3, GFR 30-59 ml/min       MELATONIN PO           metoprolol tartrate 25 MG tablet    LOPRESSOR    180 tablet    Take 1 tablet (25 mg) by mouth 2 times daily    Hypertension goal BP (blood pressure) < 140/90       zolpidem 10 MG tablet    AMBIEN    30 tablet    Take 1 tablet (10 mg) by mouth nightly as needed for sleep    Insomnia due to medical condition

## 2018-06-18 DIAGNOSIS — M25.50 ARTHRALGIA, UNSPECIFIED JOINT: ICD-10-CM

## 2018-06-18 NOTE — TELEPHONE ENCOUNTER
"Requested Prescriptions   Pending Prescriptions Disp Refills     celecoxib (CELEBREX) 200 MG capsule [Pharmacy Med Name: CELECOXIB 200MG CAPSULE] 90 capsule      Sig: TAKE 1 CAPSULE BY MOUTH EVERY DAY    NSAID Medications Failed    6/18/2018  1:01 AM       Failed - Normal ALT on file in past 12 months    Recent Labs   Lab Test 03/16/17   ALT  41            Failed - Normal AST on file in past 12 months    Recent Labs   Lab Test 03/16/17   AST  29            Failed - Patient is age 6-64 years       Failed - Normal serum creatinine on file in past 12 months    Recent Labs   Lab Test  02/08/18   0832   CR  1.28*            Passed - Blood pressure under 140/90 in past 12 months    BP Readings from Last 3 Encounters:   02/08/18 114/72   01/08/18 124/70   12/08/17 120/70                Passed - Recent (12 mo) or future (30 days) visit within the authorizing provider's specialty    Patient had office visit in the last 12 months or has a visit in the next 30 days with authorizing provider or within the authorizing provider's specialty.  See \"Patient Info\" tab in inbasket, or \"Choose Columns\" in Meds & Orders section of the refill encounter.           Passed - Normal CBC on file in past 12 months    Recent Labs   Lab Test  12/08/17   0941   WBC  6.5   RBC  4.93   HGB  15.4   HCT  45.9   PLT  222       For [facility] ONLY: [id number] = WBC, [id number] = RBC            Last Written Prescription Date:  2/8/18  Last Fill Quantity: 90,  # refills: 1   Last Office Visit with [facility], [facility] or [facility] prescribing provider:  2/8/18   Future Office Visit:       "

## 2018-06-19 NOTE — TELEPHONE ENCOUNTER
Routing refill request to provider for review/approval because:  Labs out of range:  Creatinine  Labs not current:  AST/ALT    Linda Dugan RN  [facility]

## 2018-06-20 RX ORDER — CELECOXIB 200 MG/1
CAPSULE ORAL
Qty: 90 CAPSULE | Refills: 1 | Status: SHIPPED | OUTPATIENT
Start: 2018-06-20 | End: 2018-12-15

## 2018-08-11 ENCOUNTER — HEALTH MAINTENANCE LETTER (OUTPATIENT)
Age: 68
End: 2018-08-11

## 2018-12-15 DIAGNOSIS — I10 HYPERTENSION GOAL BP (BLOOD PRESSURE) < 140/90: ICD-10-CM

## 2018-12-15 DIAGNOSIS — M25.50 ARTHRALGIA, UNSPECIFIED JOINT: ICD-10-CM

## 2018-12-15 DIAGNOSIS — N18.30 CKD (CHRONIC KIDNEY DISEASE) STAGE 3, GFR 30-59 ML/MIN (H): ICD-10-CM

## 2018-12-18 RX ORDER — METOPROLOL TARTRATE 25 MG/1
25 TABLET, FILM COATED ORAL 2 TIMES DAILY
Qty: 180 TABLET | Refills: 0 | Status: SHIPPED | OUTPATIENT
Start: 2018-12-18 | End: 2019-03-19

## 2018-12-18 RX ORDER — ATORVASTATIN CALCIUM 20 MG/1
TABLET, FILM COATED ORAL
Qty: 90 TABLET | Refills: 0 | Status: SHIPPED | OUTPATIENT
Start: 2018-12-18 | End: 2019-03-19

## 2018-12-18 RX ORDER — LISINOPRIL 2.5 MG/1
TABLET ORAL
Qty: 90 TABLET | Refills: 0 | Status: SHIPPED | OUTPATIENT
Start: 2018-12-18 | End: 2019-03-19

## 2018-12-18 RX ORDER — CELECOXIB 200 MG/1
CAPSULE ORAL
Qty: 90 CAPSULE | Refills: 0 | Status: SHIPPED | OUTPATIENT
Start: 2018-12-18 | End: 2019-03-19

## 2018-12-18 NOTE — TELEPHONE ENCOUNTER
"Requested Prescriptions   Pending Prescriptions Disp Refills     metoprolol tartrate (LOPRESSOR) 25 MG tablet [Pharmacy Med Name: METOPROLOL TARTRATE 25MG TAB] 180 tablet 3    Last Written Prescription Date:  1/818  Last Fill Quantity: 180,  # refills: 3   Last office visit: 2/8/2018 with prescribing provider:  2/8/18   Future Office Visit:     Sig: TAKE 1 TABLET (25 MG) BY MOUTH 2 TIMES DAILY    Beta-Blockers Protocol Passed - 12/15/2018  1:02 AM       Passed - Blood pressure under 140/90 in past 12 months    BP Readings from Last 3 Encounters:   02/08/18 114/72   01/08/18 124/70   12/08/17 120/70                Passed - Patient is age 6 or older       Passed - Recent (12 mo) or future (30 days) visit within the authorizing provider's specialty    Patient had office visit in the last 12 months or has a visit in the next 30 days with authorizing provider or within the authorizing provider's specialty.  See \"Patient Info\" tab in inbasket, or \"Choose Columns\" in Meds & Orders section of the refill encounter.              lisinopril (PRINIVIL/ZESTRIL) 2.5 MG tablet [Pharmacy Med Name: LISINOPRIL 2.5MG TABLET] 90 tablet 3    Last Written Prescription Date:  2/8/18  Last Fill Quantity: 90,  # refills: 3   Last office visit: 2/8/2018 with prescribing provider:  2/8/18   Future Office Visit:     Sig: TAKE 1 TABLET BY MOUTH EVERY DAY    ACE Inhibitors (Including Combos) Protocol Failed - 12/15/2018  1:02 AM       Failed - Normal serum creatinine on file in past 12 months    Recent Labs   Lab Test 02/08/18  0832   CR 1.28*            Passed - Blood pressure under 140/90 in past 12 months    BP Readings from Last 3 Encounters:   02/08/18 114/72   01/08/18 124/70   12/08/17 120/70                Passed - Recent (12 mo) or future (30 days) visit within the authorizing provider's specialty    Patient had office visit in the last 12 months or has a visit in the next 30 days with authorizing provider or within the authorizing " "provider's specialty.  See \"Patient Info\" tab in inbasket, or \"Choose Columns\" in Meds & Orders section of the refill encounter.             Passed - Patient is age 18 or older       Passed - Normal serum potassium on file in past 12 months    Recent Labs   Lab Test 02/08/18  0832   POTASSIUM 4.7             atorvastatin (LIPITOR) 20 MG tablet [Pharmacy Med Name: ATORVASTATIN 20MG TABLET] 90 tablet 3    Last Written Prescription Date:  1/8/18  Last Fill Quantity: 90,  # refills: 3   Last office visit: 2/8/2018 with prescribing provider:  2/8/18   Future Office Visit:     Sig: TAKE 1 TABLET BY MOUTH ONCE DAILY    Statins Protocol Failed - 12/15/2018  1:02 AM       Failed - LDL on file in past 12 months    Recent Labs   Lab Test 03/21/16   .0            Passed - No abnormal creatine kinase in past 12 months    No lab results found.            Passed - Recent (12 mo) or future (30 days) visit within the authorizing provider's specialty    Patient had office visit in the last 12 months or has a visit in the next 30 days with authorizing provider or within the authorizing provider's specialty.  See \"Patient Info\" tab in inbasket, or \"Choose Columns\" in Meds & Orders section of the refill encounter.             Passed - Patient is age 18 or older        celecoxib (CELEBREX) 200 MG capsule [Pharmacy Med Name: CELECOXIB 200MG CAPSULE] 90 capsule 1    Last Written Prescription Date:  6/20/18  Last Fill Quantity: 90,  # refills: 1   Last office visit: 2/8/2018 with prescribing provider:  2/8/18   Future Office Visit:     Sig: TAKE 1 CAPSULE BY MOUTH EVERY DAY    NSAID Medications Failed - 12/15/2018  1:02 AM       Failed - Normal ALT on file in past 12 months    Recent Labs   Lab Test 03/16/17   ALT 41            Failed - Normal AST on file in past 12 months    Recent Labs   Lab Test 03/16/17   AST 29            Failed - Patient is age 6-64 years       Failed - Normal CBC on file in past 12 months    Recent Labs   Lab " "Test 12/08/17  0941   WBC 6.5   RBC 4.93   HGB 15.4   HCT 45.9                   Failed - Normal serum creatinine on file in past 12 months    Recent Labs   Lab Test 02/08/18  0832   CR 1.28*            Passed - Blood pressure under 140/90 in past 12 months    BP Readings from Last 3 Encounters:   02/08/18 114/72   01/08/18 124/70   12/08/17 120/70                Passed - Recent (12 mo) or future (30 days) visit within the authorizing provider's specialty    Patient had office visit in the last 12 months or has a visit in the next 30 days with authorizing provider or within the authorizing provider's specialty.  See \"Patient Info\" tab in inbasket, or \"Choose Columns\" in Meds & Orders section of the refill encounter.              "

## 2018-12-18 NOTE — TELEPHONE ENCOUNTER
Routing refill request to provider for review/approval because:  Labs out of range:  Creatinine  Labs not current:  LDL, AST/ALT, CBC    YOCASTA Cortez, RN  [facility]

## 2019-03-19 DIAGNOSIS — N18.30 CKD (CHRONIC KIDNEY DISEASE) STAGE 3, GFR 30-59 ML/MIN (H): ICD-10-CM

## 2019-03-19 DIAGNOSIS — I10 HYPERTENSION GOAL BP (BLOOD PRESSURE) < 140/90: ICD-10-CM

## 2019-03-19 DIAGNOSIS — M25.50 ARTHRALGIA, UNSPECIFIED JOINT: ICD-10-CM

## 2019-03-21 RX ORDER — CELECOXIB 200 MG/1
CAPSULE ORAL
Qty: 30 CAPSULE | Refills: 0 | Status: SHIPPED | OUTPATIENT
Start: 2019-03-21 | End: 2019-04-05

## 2019-03-21 RX ORDER — LISINOPRIL 2.5 MG/1
TABLET ORAL
Qty: 30 TABLET | Refills: 0 | Status: SHIPPED | OUTPATIENT
Start: 2019-03-21 | End: 2019-04-05

## 2019-03-21 RX ORDER — METOPROLOL TARTRATE 25 MG/1
TABLET, FILM COATED ORAL
Qty: 60 TABLET | Refills: 0 | Status: SHIPPED | OUTPATIENT
Start: 2019-03-21 | End: 2019-04-05

## 2019-03-21 RX ORDER — ATORVASTATIN CALCIUM 20 MG/1
TABLET, FILM COATED ORAL
Qty: 30 TABLET | Refills: 0 | Status: SHIPPED | OUTPATIENT
Start: 2019-03-21 | End: 2019-04-05

## 2019-03-21 NOTE — TELEPHONE ENCOUNTER
Medication is being filled for 1 time refill only due to:  Patient needs to be seen because it has been more than one year since last visit.     Patient is scheduled 4/5/19.     CLINT CortezN, RN  [facility]

## 2019-03-21 NOTE — TELEPHONE ENCOUNTER
"Requested Prescriptions   Pending Prescriptions Disp Refills     metoprolol tartrate (LOPRESSOR) 25 MG tablet [Pharmacy Med Name: METOPROLOL TARTRATE 25MG TAB] 180 tablet 0    Last Written Prescription Date:  12/18/18  Last Fill Quantity: 180,  # refills: 0   Last office visit: 2/8/2018 with prescribing provider:  2/8/18   Future Office Visit:   Next 5 appointments (look out 90 days)    Apr 05, 2019  9:00 AM CDT  PHYSICAL with Ryan Perera MD  [facility] [place] [address] [facility] [id number] [phone]        Sig: TAKE 1 TABLET BY MOUTH TWICE A DAY    Beta-Blockers Protocol Failed - 3/19/2019  1:05 AM       Failed - Blood pressure under 140/90 in past 12 months    BP Readings from Last 3 Encounters:   02/08/18 114/72   01/08/18 124/70   12/08/17 120/70                Passed - Patient is age 6 or older       Passed - Recent (12 mo) or future (30 days) visit within the authorizing provider's specialty    Patient had office visit in the last 12 months or has a visit in the next 30 days with authorizing provider or within the authorizing provider's specialty.  See \"Patient Info\" tab in inbasket, or \"Choose Columns\" in Meds & Orders section of the refill encounter.             Passed - Medication is active on med list        atorvastatin (LIPITOR) 20 MG tablet [Pharmacy Med Name: ATORVASTATIN 20MG TABLET] 90 tablet 0    Last Written Prescription Date:  12/18/18  Last Fill Quantity: 90,  # refills: 0   Last office visit: 2/8/2018 with prescribing provider:  2/8/18   Future Office Visit:   Next 5 appointments (look out 90 days)    Apr 05, 2019  9:00 AM CDT  PHYSICAL with Ryan Perera MD  [facility] [place] [address] [facility] [id number] [phone]          Sig: TAKE 1 TABLET BY MOUTH EVERY DAY    Statins Protocol Failed - 3/19/2019  1:05 AM       Failed - LDL on file in past 12 months    Recent Labs   Lab Test 03/21/16   .0 " "           Passed - No abnormal creatine kinase in past 12 months    No lab results found.            Passed - Recent (12 mo) or future (30 days) visit within the authorizing provider's specialty    Patient had office visit in the last 12 months or has a visit in the next 30 days with authorizing provider or within the authorizing provider's specialty.  See \"Patient Info\" tab in inbasket, or \"Choose Columns\" in Meds & Orders section of the refill encounter.             Passed - Medication is active on med list       Passed - Patient is age 18 or older        lisinopril (PRINIVIL/ZESTRIL) 2.5 MG tablet [Pharmacy Med Name: LISINOPRIL 2.5MG TABLET] 90 tablet 0    Last Written Prescription Date:  12/18/18  Last Fill Quantity: 90,  # refills: 0   Last office visit: 2/8/2018 with prescribing provider:  2/8/18   Future Office Visit:   Next 5 appointments (look out 90 days)    Apr 05, 2019  9:00 AM CDT  [facility] [address] [facility] [id number] [phone]        Sig: TAKE 1 TABLET BY MOUTH EVERY DAY    ACE Inhibitors (Including Combos) Protocol Failed - 3/19/2019  1:05 AM       Failed - Blood pressure under 140/90 in past 12 months    BP Readings from Last 3 Encounters:   02/08/18 114/72   01/08/18 124/70   12/08/17 120/70                Failed - Normal serum creatinine on file in past 12 months    Recent Labs   Lab Test 02/08/18  0832   CR 1.28*            Failed - Normal serum potassium on file in past 12 months    Recent Labs   Lab Test 02/08/18  0832   POTASSIUM 4.7            Passed - Recent (12 mo) or future (30 days) visit within the authorizing provider's specialty    Patient had office visit in the last 12 months or has a visit in the next 30 days with authorizing provider or within the authorizing provider's specialty.  See \"Patient Info\" tab in inbasket, or \"Choose Columns\" in Meds & Orders section of the refill encounter.       " "      Passed - Medication is active on med list       Passed - Patient is age 18 or older        celecoxib (CELEBREX) 200 MG capsule [Pharmacy Med Name: CELECOXIB 200MG CAPSULE] 90 capsule 0    Last Written Prescription Date:  12/18/18  Last Fill Quantity: 90,  # refills: 0   Last office visit: 2/8/2018 with prescribing provider:  2/8/18   Future Office Visit:   Next 5 appointments (look out 90 days)    Apr 05, 2019  9:00 AM CDT  PHYSICAL with [facility]  [facility] [address] [facility] [id number] [phone]        Sig: TAKE 1 CAPSULE BY MOUTH EVERY DAY    NSAID Medications Failed - 3/19/2019  1:05 AM       Failed - Blood pressure under 140/90 in past 12 months    BP Readings from Last 3 Encounters:   02/08/18 114/72   01/08/18 124/70   12/08/17 120/70                Failed - Normal ALT on file in past 12 months    Recent Labs   Lab Test 03/16/17   ALT 41            Failed - Normal AST on file in past 12 months    Recent Labs   Lab Test 03/16/17   AST 29            Failed - Patient is age 6-64 years       Failed - Normal CBC on file in past 12 months    Recent Labs   Lab Test 12/08/17  0941   WBC 6.5   RBC 4.93   HGB 15.4   HCT 45.9                   Failed - Normal serum creatinine on file in past 12 months    Recent Labs   Lab Test 02/08/18  0832   CR 1.28*            Passed - Recent (12 mo) or future (30 days) visit within the authorizing provider's specialty    Patient had office visit in the last 12 months or has a visit in the next 30 days with authorizing provider or within the authorizing provider's specialty.  See \"Patient Info\" tab in inbasket, or \"Choose Columns\" in Meds & Orders section of the refill encounter.             Passed - Medication is active on med list        "

## 2019-04-02 ASSESSMENT — ENCOUNTER SYMPTOMS
CHILLS: 0
SORE THROAT: 0
EYE PAIN: 0
ABDOMINAL PAIN: 0
PARESTHESIAS: 0
FEVER: 0
FREQUENCY: 1
NAUSEA: 0
WEAKNESS: 0
NERVOUS/ANXIOUS: 0
CONSTIPATION: 0
SHORTNESS OF BREATH: 0
ARTHRALGIAS: 0
HEMATOCHEZIA: 0
JOINT SWELLING: 0
DIARRHEA: 0
DIZZINESS: 0
HEARTBURN: 0
HEADACHES: 0
DYSURIA: 0
HEMATURIA: 0
MYALGIAS: 0
COUGH: 0
PALPITATIONS: 0

## 2019-04-02 ASSESSMENT — ACTIVITIES OF DAILY LIVING (ADL): CURRENT_FUNCTION: NO ASSISTANCE NEEDED

## 2019-04-05 ENCOUNTER — OFFICE VISIT (OUTPATIENT)
Dept: FAMILY MEDICINE | Facility: OTHER | Age: 69
End: 2019-04-05
Payer: COMMERCIAL

## 2019-04-05 VITALS
HEIGHT: 71 IN | SYSTOLIC BLOOD PRESSURE: 130 MMHG | BODY MASS INDEX: 30.73 KG/M2 | HEART RATE: 94 BPM | WEIGHT: 219.5 LBS | OXYGEN SATURATION: 99 % | DIASTOLIC BLOOD PRESSURE: 80 MMHG | TEMPERATURE: 97.3 F | RESPIRATION RATE: 16 BRPM

## 2019-04-05 DIAGNOSIS — E78.5 HYPERLIPIDEMIA LDL GOAL <100: ICD-10-CM

## 2019-04-05 DIAGNOSIS — Z00.00 MEDICARE ANNUAL WELLNESS VISIT, SUBSEQUENT: Primary | ICD-10-CM

## 2019-04-05 DIAGNOSIS — M25.50 ARTHRALGIA, UNSPECIFIED JOINT: ICD-10-CM

## 2019-04-05 DIAGNOSIS — Z12.11 SPECIAL SCREENING FOR MALIGNANT NEOPLASMS, COLON: ICD-10-CM

## 2019-04-05 DIAGNOSIS — I10 HYPERTENSION GOAL BP (BLOOD PRESSURE) < 140/90: ICD-10-CM

## 2019-04-05 DIAGNOSIS — H90.8 MIXED CONDUCTIVE AND SENSORINEURAL HEARING LOSS, UNSPECIFIED LATERALITY: ICD-10-CM

## 2019-04-05 DIAGNOSIS — N18.30 CKD (CHRONIC KIDNEY DISEASE) STAGE 3, GFR 30-59 ML/MIN (H): ICD-10-CM

## 2019-04-05 LAB
ANION GAP SERPL CALCULATED.3IONS-SCNC: 10 MMOL/L (ref 3–14)
BUN SERPL-MCNC: 25 MG/DL (ref 7–30)
CALCIUM SERPL-MCNC: 9.4 MG/DL (ref 8.5–10.1)
CHLORIDE SERPL-SCNC: 107 MMOL/L (ref 94–109)
CHOLEST SERPL-MCNC: 130 MG/DL
CO2 SERPL-SCNC: 24 MMOL/L (ref 20–32)
CREAT SERPL-MCNC: 1.23 MG/DL (ref 0.66–1.25)
CREAT UR-MCNC: 136 MG/DL
GFR SERPL CREATININE-BSD FRML MDRD: 60 ML/MIN/{1.73_M2}
GLUCOSE SERPL-MCNC: 103 MG/DL (ref 70–99)
HDLC SERPL-MCNC: 49 MG/DL
LDLC SERPL CALC-MCNC: 67 MG/DL
MICROALBUMIN UR-MCNC: 11 MG/L
MICROALBUMIN/CREAT UR: 8.01 MG/G CR (ref 0–17)
NONHDLC SERPL-MCNC: 81 MG/DL
POTASSIUM SERPL-SCNC: 4.7 MMOL/L (ref 3.4–5.3)
SODIUM SERPL-SCNC: 141 MMOL/L (ref 133–144)
TRIGL SERPL-MCNC: 71 MG/DL

## 2019-04-05 PROCEDURE — G0439 PPPS, SUBSEQ VISIT: HCPCS | Performed by: FAMILY MEDICINE

## 2019-04-05 PROCEDURE — 80061 LIPID PANEL: CPT | Performed by: FAMILY MEDICINE

## 2019-04-05 PROCEDURE — 36415 COLL VENOUS BLD VENIPUNCTURE: CPT | Performed by: FAMILY MEDICINE

## 2019-04-05 PROCEDURE — 80048 BASIC METABOLIC PNL TOTAL CA: CPT | Performed by: FAMILY MEDICINE

## 2019-04-05 PROCEDURE — 82043 UR ALBUMIN QUANTITATIVE: CPT | Performed by: FAMILY MEDICINE

## 2019-04-05 RX ORDER — METOPROLOL TARTRATE 25 MG/1
TABLET, FILM COATED ORAL
Qty: 180 TABLET | Refills: 3 | Status: SHIPPED | OUTPATIENT
Start: 2019-04-05 | End: 2020-03-09

## 2019-04-05 RX ORDER — CELECOXIB 200 MG/1
CAPSULE ORAL
Qty: 90 CAPSULE | Refills: 3 | Status: SHIPPED | OUTPATIENT
Start: 2019-04-05 | End: 2020-03-09

## 2019-04-05 RX ORDER — ATORVASTATIN CALCIUM 20 MG/1
20 TABLET, FILM COATED ORAL DAILY
Qty: 90 TABLET | Refills: 3 | Status: SHIPPED | OUTPATIENT
Start: 2019-04-05 | End: 2020-03-09

## 2019-04-05 RX ORDER — LISINOPRIL 2.5 MG/1
2.5 TABLET ORAL DAILY
Qty: 90 TABLET | Refills: 3 | Status: SHIPPED | OUTPATIENT
Start: 2019-04-05 | End: 2020-03-05

## 2019-04-05 ASSESSMENT — ENCOUNTER SYMPTOMS
DYSURIA: 0
NAUSEA: 0
HEMATOCHEZIA: 0
FREQUENCY: 1
COUGH: 0
DIARRHEA: 0
JOINT SWELLING: 0
DIZZINESS: 0
NERVOUS/ANXIOUS: 0
SHORTNESS OF BREATH: 0
HEARTBURN: 0
CONSTIPATION: 0
PARESTHESIAS: 0
ABDOMINAL PAIN: 0
PALPITATIONS: 0
HEMATURIA: 0
HEADACHES: 0
CHILLS: 0
ARTHRALGIAS: 0
MYALGIAS: 0
EYE PAIN: 0
SORE THROAT: 0
WEAKNESS: 0
FEVER: 0

## 2019-04-05 ASSESSMENT — PAIN SCALES - GENERAL: PAINLEVEL: NO PAIN (0)

## 2019-04-05 ASSESSMENT — ACTIVITIES OF DAILY LIVING (ADL): CURRENT_FUNCTION: NO ASSISTANCE NEEDED

## 2019-04-05 ASSESSMENT — MIFFLIN-ST. JEOR: SCORE: 1784.84

## 2019-04-05 NOTE — PATIENT INSTRUCTIONS
Preventive Health Recommendations:     See your health care provider every year to    Review health changes.     Discuss preventive care.      Review your medicines if your doctor has prescribed any.    Talk with your health care provider about whether you should have a test to screen for prostate cancer (PSA).    Every 3 years, have a diabetes test (fasting glucose). If you are at risk for diabetes, you should have this test more often.    Every 5 years, have a cholesterol test. Have this test more often if you are at risk for high cholesterol or heart disease.     Every 10 years, have a colonoscopy. Or, have a yearly FIT test (stool test). These exams will check for colon cancer.    Talk to with your health care provider about screening for Abdominal Aortic Aneurysm if you have a family history of AAA or have a history of smoking.  Shots:     Get a flu shot each year.     Get a tetanus shot every 10 years.     Talk to your doctor about your pneumonia vaccines. There are now two you should receive - Pneumovax (PPSV 23) and Prevnar (PCV 13).    Talk to your pharmacist about a shingles vaccine.     Talk to your doctor about the hepatitis B vaccine.  Nutrition:     Eat at least 5 servings of fruits and vegetables each day.     Eat whole-grain bread, whole-wheat pasta and brown rice instead of white grains and rice.     Get adequate Calcium and Vitamin D.   Lifestyle    Exercise for at least 150 minutes a week (30 minutes a day, 5 days a week). This will help you control your weight and prevent disease.     Limit alcohol to one drink per day.     No smoking.     Wear sunscreen to prevent skin cancer.     See your dentist every six months for an exam and cleaning.     See your eye doctor every 1 to 2 years to screen for conditions such as glaucoma, macular degeneration and cataracts.    Personalized Prevention Plan  You are due for the preventive services outlined below.  Your care team is available to assist you in  scheduling these services.  If you have already completed any of these items, please share that information with your care team to update in your medical record.    Health Maintenance Due   Topic Date Due     Hepatitis C Screening  03/20/1968     Annual Wellness Visit  03/20/2017     Pneumococcal Vaccine (1 of 2 - PCV13) 03/20/2017     FALL RISK ASSESSMENT  06/05/2018     Colonoscopy - every 5 years  06/10/2018

## 2019-04-05 NOTE — PROGRESS NOTES
"SUBJECTIVE:   Dav Seay is a 69 year old male who presents for Preventive Visit.  Are you in the first 12 months of your Medicare coverage?  No    Annual Wellness Visit     In general, how would you rate your overall health?  Good    Frequency of exercise:  4-5 days/week    Do you usually eat at least 4 servings of fruit and vegetables a day, include whole grains    & fiber and avoid regularly eating high fat or \"junk\" foods?  No    Taking medications regularly:  Yes    Medication side effects:  No muscle aches and No significant flushing    Ability to successfully perform activities of daily living:  No assistance needed    Home Safety:  No safety concerns identified    Hearing Impairment:  Difficulty following a conversation in a noisy restaurant or crowded room, feel that people are mumbling or not speaking clearly, difficulty following dialogue in the theater, need to ask people to speak up or repeat themselves and difficulty understanding soft or whispered speech    In the past 6 months, have you been bothered by leaking of urine?  No    In general, how would you rate your overall mental or emotional health?  Good    PHQ-2 Total Score: 0    Additional concerns today:  No    Do you feel safe in your environment? Yes    Do you have a Health Care Directive? Yes: Advance Directive has been received and scanned.      Fall risk  Fallen 2 or more times in the past year?: No  Any fall with injury in the past year?: No    Cognitive Screening   1) Repeat 3 items (Leader, Season, Table)    2) Clock draw: NORMAL  3) 3 item recall: Recalls 3 objects  Results: 3 items recalled: COGNITIVE IMPAIRMENT LESS LIKELY    Mini-CogTM Copyright ENIO Mckeon. Licensed by the author for use in [facility]; reprinted with permission ([email or web address]). All rights reserved.      Do you have sleep apnea, excessive snoring or daytime drowsiness?: yes    Reviewed and updated as needed this visit by clinical staff  Tobacco  " Allergies  Meds  Problems  Med Hx  Surg Hx  Fam Hx  Soc Hx          Reviewed and updated as needed this visit by Provider  Tobacco  Allergies  Meds  Problems  Med Hx  Surg Hx  Fam Hx        Social History     Tobacco Use     Smoking status: Never Smoker     Smokeless tobacco: Never Used   Substance Use Topics     Alcohol use: Yes     Comment: occasional       Alcohol Use 4/2/2019   If you drink alcohol do you typically have greater than 3 drinks per day OR greater than 7 drinks per week? No           Hyperlipidemia Follow-Up      Rate your low fat/cholesterol diet?: good    Taking statin?  Yes, no muscle aches from statin    Other lipid medications/supplements?:  none    Hypertension Follow-up      Outpatient blood pressures are being checked at home.  Results are stable.    Low Salt Diet: no added salt    Chronic Kidney Disease Follow-up      Current NSAID use?  No      Current providers sharing in care for this patient include:   Patient Care Team:  Ryan Perera MD as PCP - General  Ryan Perera MD as Assigned PCP    The following health maintenance items are reviewed in Epic and correct as of today:  Health Maintenance   Topic Date Due     HEPATITIS C SCREENING  03[birth year]20/1968     MEDICARE ANNUAL WELLNESS VISIT  03/20/2017     PNEUMOCOCCAL IMMUNIZATION 65+ LOW/MEDIUM RISK (1 of 2 - PCV13) 03/20/2017     FALL RISK ASSESSMENT  06/05/2018     COLONOSCOPY Q5 YR  06/10/2018     PHQ-2 Q1 YR  04/05/2020     DTAP/TDAP/TD IMMUNIZATION (3 - Td) 08/02/2020     ADVANCE DIRECTIVE PLANNING Q5 YRS  09/16/2020     LIPID SCREEN Q5 YR MALE (SYSTEM ASSIGNED)  03/21/2021     INFLUENZA VACCINE  Completed     ZOSTER IMMUNIZATION  Completed     AORTIC ANEURYSM SCREENING (SYSTEM ASSIGNED)  Completed     IPV IMMUNIZATION  Aged Out     MENINGITIS IMMUNIZATION  Aged Out     Labs reviewed in EPIC  BP Readings from Last 3 Encounters:   04/05/19 130/80   02/08/18 114/72   01/08/18 124/70    Wt Readings from Last 3 Encounters:    04/05/19 99.6 kg (219 lb 8 oz)   02/08/18 98.4 kg (217 lb)   01/08/18 101.2 kg (223 lb 1.6 oz)                  Patient Active Problem List   Diagnosis     CARDIOVASCULAR SCREENING; LDL GOAL LESS THAN 160     Hypertension goal BP (blood pressure) < 140/90     Advance Care Planning     Insomnia, transient     Carpal tunnel syndrome of left wrist     Carpal tunnel syndrome of right wrist     CKD (chronic kidney disease) stage 3, GFR 30-59 ml/min (H)     Past Surgical History:   Procedure Laterality Date     COLONOSCOPY  6/10/2013    Procedure: COLONOSCOPY;  Colonoscopy;  Surgeon: Wes Pablo MD;  Location: [facility]     HC COLONOSCOPY THRU STOMA W BIOPSY/CAUTERY TUMOR/POLYP/LESION  2003    hyperplastic polyp repeat in 2008.     HC REMOVAL OF TONSILS,<11 Y/O  5 years old    Tonsils <14y.o.     HERNIA REPAIR, INGUINAL RT/LT       JOINT REPLACEMENT, HIP RT/LT  12/19/11    Left total hip arthroplasty. [facility]     JOINT REPLACEMENT, HIP RT/LT  12/18/2017    Right tota hip arthroplasty. [facility]     RELEASE CARPAL TUNNEL Left 10/18/2017    Procedure: RELEASE CARPAL TUNNEL;  Left Carpal Tunnel Release;  Surgeon: Norris Townsend MD;  Location:  OR       Social History     Tobacco Use     Smoking status: Never Smoker     Smokeless tobacco: Never Used   Substance Use Topics     Alcohol use: Yes     Comment: occasional     Family History   Problem Relation Age of Onset     Heart Disease Mother         Tachycardia     Osteoporosis Mother      Diabetes Father      Cancer Father         skin, lymph and colon CA     Arthritis Brother      Anesthesia Reaction No family hx of          Current Outpatient Medications   Medication Sig Dispense Refill     acetaminophen (TYLENOL) 500 MG tablet Take 1,000 mg by mouth       ASPIRIN PO Take 81 mg by mouth       atorvastatin (LIPITOR) 20 MG tablet TAKE 1 TABLET BY MOUTH EVERY DAY 30 tablet 0     celecoxib (CELEBREX) 200 MG capsule TAKE 1 CAPSULE BY MOUTH  EVERY DAY 30 capsule 0     lisinopril (PRINIVIL/ZESTRIL) 2.5 MG tablet TAKE 1 TABLET BY MOUTH EVERY DAY 30 tablet 0     MELATONIN PO        metoprolol tartrate (LOPRESSOR) 25 MG tablet TAKE 1 TABLET BY MOUTH TWICE A DAY 60 tablet 0     zolpidem (AMBIEN) 10 MG tablet Take 1 tablet (10 mg) by mouth nightly as needed for sleep 30 tablet 1     Allergies   Allergen Reactions     No Known Drug Allergies      Recent Labs   Lab Test 02/08/18  0832 12/27/17  0804 12/08/17  0941 03/16/17 03/21/16 02/25/15  0827   LDL  --   --   --   --  100.0 100   HDL  --   --   --   --  49 49   TRIG  --   --   --   --  75 94   ALT  --   --   --  41 31  --    CR 1.28* 1.32* 1.35* 1.3 1.3 1.21   GFRESTIMATED 56* 54* 53* 56 56 61   GFRESTBLACK 68 66 64  --   --  73   POTASSIUM 4.7  --  4.9 5.1* 4.9 4.5      Pneumonia Vaccine:Adults age 65+ who have not received previous Pneumovax (PPSV23) or PCV13 as an adult: Should first be given PCV13 AND then should be given PPSV23 6-12 months after PCV13    Review of Systems   Constitutional: Negative for chills and fever.   HENT: Positive for hearing loss. Negative for congestion, ear pain and sore throat.    Eyes: Negative for pain and visual disturbance.   Respiratory: Negative for cough and shortness of breath.    Cardiovascular: Negative for chest pain, palpitations and peripheral edema.   Gastrointestinal: Negative for abdominal pain, constipation, diarrhea, heartburn, hematochezia and nausea.   Genitourinary: Positive for frequency. Negative for discharge, dysuria, genital sores, hematuria, impotence and urgency.   Musculoskeletal: Negative for arthralgias, joint swelling and myalgias.   Skin: Negative for rash.   Neurological: Negative for dizziness, weakness, headaches and paresthesias.   Psychiatric/Behavioral: Negative for mood changes. The patient is not nervous/anxious.          OBJECTIVE:   /80 (BP Location: Left arm, Patient Position: Chair, Cuff Size: Adult Large)   Pulse 94   Temp  "97.3  F (36.3  C) (Temporal)   Resp 16   Ht 1.791 m (5' 10.5\")   Wt 99.6 kg (219 lb 8 oz)   SpO2 99%   BMI 31.05 kg/m   Estimated body mass index is 31.05 kg/m  as calculated from the following:    Height as of this encounter: 1.791 m (5' 10.5\").    Weight as of this encounter: 99.6 kg (219 lb 8 oz).  Physical Exam  GENERAL: healthy, alert and no distress  EYES: Eyes grossly normal to inspection, PERRL and conjunctivae and sclerae normal  HENT: ear canals and TM's normal, nose and mouth without ulcers or lesions  NECK: no adenopathy, no asymmetry, masses, or scars and thyroid normal to palpation  RESP: lungs clear to auscultation - no rales, rhonchi or wheezes  CV: regular rate and rhythm, normal S1 S2, no S3 or S4, no murmur, click or rub, no peripheral edema and peripheral pulses strong  ABDOMEN: soft, nontender, no hepatosplenomegaly, no masses and bowel sounds normal  MS: no gross musculoskeletal defects noted, no edema  SKIN: no suspicious lesions or rashes  NEURO: Normal strength and tone, mentation intact and speech normal  PSYCH: mentation appears normal, affect normal/bright    Diagnostic Test Results:  No results found for this or any previous visit (from the past 24 hour(s)).    ASSESSMENT / PLAN:       ICD-10-CM    1. Medicare annual wellness visit, subsequent Z00.00    2. CKD (chronic kidney disease) stage 3, GFR 30-59 ml/min (H) N18.3 Lipid panel reflex to direct LDL Fasting     Basic metabolic panel     Albumin Random Urine Quantitative with Creat Ratio     atorvastatin (LIPITOR) 20 MG tablet     lisinopril (PRINIVIL/ZESTRIL) 2.5 MG tablet   3. Arthralgia, unspecified joint M25.50 celecoxib (CELEBREX) 200 MG capsule   4. Hypertension goal BP (blood pressure) < 140/90 I10 Basic metabolic panel     Albumin Random Urine Quantitative with Creat Ratio     lisinopril (PRINIVIL/ZESTRIL) 2.5 MG tablet     metoprolol tartrate (LOPRESSOR) 25 MG tablet   5. Hyperlipidemia LDL goal <100 E78.5 Lipid panel " "reflex to direct LDL Fasting     atorvastatin (LIPITOR) 20 MG tablet   6. Special screening for malignant neoplasms, colon Z12.11 GASTROENTEROLOGY ADULT REF PROCEDURE ONLY [facility] [phone]   7. 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  Practitioner is supportive of decision.    COUNSELING:  Reviewed preventive health counseling, as reflected in patient instructions       Regular exercise       Healthy diet/nutrition       Vision screening       Immunizations  Vaccinated for: Pneumococcal at [facility]         Aspirin Prophylaxsis       Colon cancer screening       Prostate cancer screening       The ASCVD Risk score ([place] KELLY Jr., et al., 2013) failed to calculate for the following reasons:    Cannot find a previous HDL lab    Cannot find a previous total cholesterol lab    BP Readings from Last 1 Encounters:   04/05/19 130/80     Estimated body mass index is 31.05 kg/m  as calculated from the following:    Height as of this encounter: 1.791 m (5' 10.5\").    Weight as of this encounter: 99.6 kg (219 lb 8 oz).      Weight management plan: Discussed healthy diet and exercise guidelines     reports that  has never smoked. he has never used smokeless tobacco.      Appropriate preventive services were discussed with this patient, including applicable screening as appropriate for cardiovascular disease, diabetes, osteopenia/osteoporosis, and glaucoma.  As appropriate for age/gender, discussed screening for colorectal cancer, prostate cancer, breast cancer, and cervical cancer. Checklist reviewing preventive services available has been given to the patient.    Reviewed patients plan of care and provided an AVS. The Basic Care Plan (routine screening as documented in Health Maintenance) for Dav meets the Care Plan requirement. This Care Plan has been established " and reviewed with the Patient.    Counseling Resources:  ATP IV Guidelines  Pooled Cohorts Equation Calculator  Breast Cancer Risk Calculator  FRAX Risk Assessment  ICSI Preventive Guidelines  Dietary Guidelines for Americans, 2010  USDA's MyPlate  ASA Prophylaxis  Lung CA Screening    Ryan Perera MD  [facility]

## 2019-04-05 NOTE — LETTER

## 2019-04-08 ENCOUNTER — TELEPHONE (OUTPATIENT)
Dept: FAMILY MEDICINE | Facility: OTHER | Age: 69
End: 2019-04-08

## 2019-04-08 DIAGNOSIS — Z12.11 ENCOUNTER FOR SCREENING COLONOSCOPY: Primary | ICD-10-CM

## 2019-04-08 RX ORDER — BISACODYL 5 MG/1
TABLET, DELAYED RELEASE ORAL
Qty: 4 TABLET | Refills: 0 | Status: SHIPPED | OUTPATIENT
Start: 2019-04-08 | End: 2020-06-15

## 2019-04-08 NOTE — LETTER
[facility]  [address] [facility] [id number] [phone]        April 8, 2019    Dav Seay  [address]  [facility] [address]

## 2019-04-08 NOTE — LETTER
COLONOSCOPY INSTRUCTIONS   For patients with CHF, cardiomyopathy,   kidney and/or renal disease or over the age of 75    (with GoLytely/NuLytely/CoLyte)    Patient Name   Dav Seay   Procedure Date   5/20/19 Arrival Time   9:30am Procedure Time   10:30am   Physician   Dr. [facility]   [facility]   Other Instructions       Review the preparation schedule below for the five days preceding your procedure.     If you have any questions, please call [phone].  YOU WILL NEED TO PURCHASE   - Bisacodyl/Dulcolax tablets  5 mg (4 ORAL tablets) (No prescription needed)  - Fill your prescription for GoLytely/NuLytely/Colyte  - A packet of non-red or non-purple Crystal Light (Optional--to improve taste of prep solution)   BEFORE THE PROCEDURE   - You will receive a phone call 1 to 2 days prior to your procedure. Information will be collected to pre-admit you, which will assist us in giving you the best care possible or you can call [phone].  - If you are diabetic, consult your physician for additional instruction.  - Check with your insurance carrier about coverage (phone number on the back of your insurance card).  - You will need to make arrangements to have someone drive you home. You will not be able to drive the day of your examination. You can expect to be here approximately 2 hours; your  needs to be at [facility] 2 hours after your arrival time.  - You will not be able to return to work the day of your procedure.  - If using iron supplements, stop taking those five days before your procedure.  - Three days before your procedure, begin a low-fiber diet. Avoid raw fruits, vegetables, whole wheat, nuts, popcorn, Metamucil, Fibercon, bran or bulking agents. Meats and cooked vegetables are fine.  - Two days before your procedure, increase your water intake (8 glasses of water is recommended).   DAY BEFORE PROCEDURE   - IF YOU ARE SCHEDULED TO ARRIVE AT 11 AM OR BEFORE FOLLOW  THE INSTRUCTIONS BELOW  - You will need to be on a clear liquid diet the entire day. No solid foods.  - In the morning, mix the GoLytely/NuLytely/Colyte powder with water until completely dissolved and then refrigerate.  - At 9 am take four tablets of Bisacodyl.  -  At 3 pm start drinking the prep solution. You will need to have access to the bathroom once you start drinking the prep solution. Some people prefer to drink the solution at room temperature. You may take it out of the refrigerator 1-2 hours prior to drinking it. You may also add a packet of non-red or non-purple Crystal Light to improve the taste. It is best to drink an 8 oz glassful every 15 minutes.    - It will take about 4-6 hours to drink the solution.  Continue drinking clear liquids after you have finished the prep solution.   - If you experience bloating, cramping, nausea, or vomiting, take a 15-30 minute break and then start drinking prep solution again.     IF YOU ARE SCHEDULED TO ARRIVE 11:00 AM OR LATER:  DAY BEFORE PROCEDURE   - You will need to be on a clear liquid diet the entire day (SEE BELOW). No solid foods.  - In the morning, mix the GoLytely/NuLytely/Colyte powder with water until completely dissolved and then refrigerate.  - At 9 am take four tablets of Bisacodyl.  - At 3 pm you will start drinking your prep solution. You will only be drinking half of the solution today, it should take you about 2 - 3 hours to drink half. Some people prefer to drink the solution at room temperature. You may take it out of the refrigerator 1-2 hours prior to drinking it. You may also add a packet of non-red or non-purple Crystal Light to improve the taste. It is best to drink an 8 oz glassful every 15 minutes.    -  Put the remainder of the prep solution back in the refrigerator for procedure morning.   - If you experience bloating, cramping, nausea, or vomiting, take a 15-30 minute break and then start drinking the prep solution again.   PROCEDURE  DAY    - THIS STEP ONLY FOR THOSE WHO DRANK HALF OF PREP DAY BEFORE  - Start drinking your last half of prep solution at 6 am.  Drink an 8 oz glass every 15 minutes until prep solution is gone.  It will take about 2 to 3 hours to finish solution.   - No eating or drinking 3 hours prior to your procedure.  - If you experience bloating, cramping, nausea, or vomiting take a 15 to 30 minute break and then start drinking prep solution again.   Dress comfortably. Short sleeves are allowed under your patient gown. You may have clear liquids until three hours before your procedure. Please do not wear any perfume or cologne.    Please check with your provider regarding holding any medications. Please bring a list of your current medications with you. If you have any questions regarding these instructions, please call us at [phone].        CLEAR LIQUID DIET  The clear liquid diet are foods that are free of fat and fiber. They will liquefy to clear liquid at room temperature. No red or purple colored juices or Jell-O s, dairy products, or alcoholic beverages.  TYPE OF FOOD USE ONLY THESE FOODS   Beverages Coffee      Tea      Decaffeinated coffee  Carbonated beverages (pop)  Water  Sport drinks (except red or purple)   Desserts Jell-O (except red or purple)  Fruit ice  Popsicle   Fruit and Fruit Juices Citrus juices, strained  Fruit nectars, strained  Apple juice  Fruit drinks (except red or purple)   Soups Broth  Bouillon  Consommé  Meat stock, strained  Vegetable broth, strained   Sweets Hard candy, non-red or non-purple  Sugar   Miscellaneous Flavorings  Salt           Directions to [facility]    From the North:   Take the [address]nd [address] exit off of [address] (on the south side of [place]).  Turn left on [address]  Turn left at the first stoplight (at [facility]).  The hospital and clinic is the third building on the left.     From the South:  Follow [address] north to the first  [place] exit.  Exit on [address] following it to the right.  Turn left at the first stoplight.  The hospital and clinic is the third building on the left.    From the East:     Following [address] west, turn left at the stoplight onto [address] Follow [address] through [place].  Take a right at the light on [address] (at [facility]).  The hospital and clinic is the third building on the left.    From the West:    Following [address] going east, turn south on [address].  Take the [address] exit off of [address] (on the south side of [place]).  Follow [address] through [place] to the stoplight on [address] (at [facility]). Take a left.  The hospital and clinic is the third building on the left.    Colonoscopy  What you should know    What is a colonoscopy?  A colonoscopy lets the doctor look inside your large intestine (colon). The doctor guides a long, flexible, lighted tube through the entire colon. The exam is used to look for early signs of cancer. It is also used to find the cause of a change in bowel habits. The doctor is able to see any inflamed tissue, polyps, ulcers, bleeding or muscle spasms.    How do I prepare for the exam?  See the guidelines for cleaning out your colon. The steps to prepare your colon begin four days before the exam.    Please arrive with someone who can drive you home after the exam. The medicines used in the exam will make you sleepy.  You will not be able to drive. You cannot take a bus or taxi by yourself.  You cannot walk home.    How do I prepare the day of the exam?    *Dress in comfortable, loose clothes.  *Leave your purse, billfold, credit cards, etc. at home.  *Bring your insurance card.  *Bring a list of your medications.  *Plan to arrive on time.  *We do our best to stay on time, but there may be a delay. Please bring something to pass the time, such as a newspaper, book or magazine.    What happens when I arrive?    *You will do some paper  work.  *We will take you to the admission area. Your family may stay with you during this time.  *A nurse will check your records and ask you questions.  *You will change into a hospital gown without underwear.   *You will sign a consent form.  *We will start an IV (intravenous). We will use it to give you medicines during the exam.    What happens during the exam?    *We will take you on a cart to the exam room.   *You can ask questions of the doctor who is doing your exam.  *You will lie on your left side on a table.    *You will receive medicines through your IV to relax you and for pain.    *The doctor will insert a thin, lighted tube (scope) into your rectum. Then the doctor will slowly guide it into your colon. The scope bends, so he or she can move it around the curves of your colon.    *The scope sends an image of the inside of the colon. The image allows the doctor to examine the lining of the colon.    *We may ask you to change positions to help the doctor move the scope.    *The scope also blows air into your colon. This enlarges the colon and helps the doctor see the lining.  *You should feel little pain during the exam.   *You may feel full and have cramps. Breathe slowly and evenly to help your body relax. Tell your doctor or nurse if you have any pain.    If we see a polyp, we will remove a piece of it (polypectomy). That tissue (biopsy) will be tested in the lab. Most polyps are benign (not cancer). We remove most polyps because they can cause bleeding or turn into cancer.     Risks of the exam are bleeding and piercing or tearing the colon. But this is rare.    What happens after the exam?    *We will return you to your room. We will watch you for one hour or until most of the pain medicine has worn off.  *You may feel bloated or have cramping for a short time because of the air injected during the exam. You will pass the rest of the air from your colon in the next couple hours.  *We will remove the  IV.   *Your first meal should be light. Slowly return to normal meals, unless your doctor gives you other orders.

## 2019-04-08 NOTE — TELEPHONE ENCOUNTER
Date of colonoscopy/EGD: 5/20/19  Surgeon: Dr. Harris  Prep:GoLytely, meds ordered, routed to provider  Packet:Colonoscopy/EGD instructions mailed to patient's home address.   Date: 4/8/2019      Surgery Scheduler

## 2019-04-12 ENCOUNTER — OFFICE VISIT (OUTPATIENT)
Dept: AUDIOLOGY | Facility: CLINIC | Age: 69
End: 2019-04-12
Attending: FAMILY MEDICINE
Payer: COMMERCIAL

## 2019-04-12 DIAGNOSIS — H90.3 SENSORINEURAL HEARING LOSS, BILATERAL: Primary | ICD-10-CM

## 2019-04-12 DIAGNOSIS — H93.13 TINNITUS OF BOTH EARS: ICD-10-CM

## 2019-04-12 PROCEDURE — 92550 TYMPANOMETRY & REFLEX THRESH: CPT | Performed by: AUDIOLOGIST

## 2019-04-12 PROCEDURE — 99207 ZZC NO CHARGE LOS: CPT | Performed by: AUDIOLOGIST

## 2019-04-12 PROCEDURE — 92557 COMPREHENSIVE HEARING TEST: CPT | Performed by: AUDIOLOGIST

## 2019-04-12 NOTE — PROGRESS NOTES
"AUDIOLOGY REPORT    SUBJECTIVE:  Dav Seay is a 69 year old male who was seen in the Audiology Clinic at the [facility] for audiologic evaluation, referred by Ryan Perera M.D. The patient reports a gradual decrease in hearing over several years, worse in the last year \"neighbor can hear my TV\" and difficulty hearing conversation especially in groups. 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  He reported what sounded consistent with temporary threshold shift and tinnitus after combat noise exposure without hearing protectionThe patient denies  bilateral otalgia, bilateral drainage, bilateral aural fullness and vertigo.  The patient notes difficulty with communication in a variety of listening situations, as noted.  They were accompanied today by their self.    OBJECTIVE:  Otoscopic exam indicates cerumen in the right ear non-occluding deep in canal possibly in contact with the tympanic membrane, left ear was clear of wax and tympanic membrane was visible.    Pure Tone Thresholds assessed using conventional audiometry with good  reliability from 250-8000 Hz bilaterally using insert earphones     RIGHT:  mild to moderate to severe sensorineural hearing loss    LEFT:    mild to moderate to severe sensorineural hearing loss    Tympanogram:    RIGHT: normal eardrum mobility    LEFT:   normal eardrum mobility    Reflexes (reported by stimulus ear):  RIGHT: Ipsilateral is present at normal levels  RIGHT: Contralateral is present at normal levels  LEFT:   Ipsilateral is present at normal levels  LEFT:   Contralateral is present at normal levels      Speech Reception Threshold:    RIGHT: 30 dB HL    LEFT:   20 dB HL    Word Recognition Score Mono-Syllabic In Quiet:     RIGHT: 92% at 85 dB HL using NU-6 recorded word list.    LEFT:   96% at 80 dB HL using " NU-6 recorded word list.    Speech in Noise - QuickSIN  BINAURAL: 3 dB SNR, may hear as well as normals, at 80 dB HL used 3 lists and averaged score    The Tinnitus Handicap Inventory is a self-report measure that can be used to quantify the impact of tinnitus on daily living.  The patient scored 22/100  indicating mild (grade 2) perceived impairment.        ASSESSMENT:     ICD-10-CM    1. Sensorineural hearing loss, bilateral H90.3    2. Tinnitus of both ears H93.13        Today s results were discussed with the patient in detail.     PLAN:  Patient was counseled regarding hearing loss and impact on communication. Handout on good communication strategies, and hearing aid use was given to patient. It is recommended that the patient schedule a hearing aid consultation appointment, discussed use of over the counter cerumenolytic for the right ear.  Advised the patient he may be [facility] Please call this clinic with questions regarding these results or recommendations.        Rani Ambrocio.  [place] Licensed Audiologist #[id number]

## 2019-05-20 ENCOUNTER — ANESTHESIA (OUTPATIENT)
Dept: GASTROENTEROLOGY | Facility: CLINIC | Age: 69
End: 2019-05-20
Payer: MEDICARE

## 2019-05-20 ENCOUNTER — HOSPITAL ENCOUNTER (OUTPATIENT)
Facility: CLINIC | Age: 69
Discharge: HOME | End: 2019-05-20
Attending: FAMILY MEDICINE | Admitting: FAMILY MEDICINE
Payer: MEDICARE

## 2019-05-20 ENCOUNTER — SURGERY (OUTPATIENT)
Age: 69
End: 2019-05-20
Payer: COMMERCIAL

## 2019-05-20 ENCOUNTER — ANESTHESIA EVENT (OUTPATIENT)
Dept: GASTROENTEROLOGY | Facility: CLINIC | Age: 69
End: 2019-05-20
Payer: MEDICARE

## 2019-05-20 VITALS
DIASTOLIC BLOOD PRESSURE: 77 MMHG | RESPIRATION RATE: 16 BRPM | SYSTOLIC BLOOD PRESSURE: 109 MMHG | HEART RATE: 54 BPM | TEMPERATURE: 98.1 F | OXYGEN SATURATION: 93 %

## 2019-05-20 LAB — COLONOSCOPY: NORMAL

## 2019-05-20 PROCEDURE — 45378 DIAGNOSTIC COLONOSCOPY: CPT | Performed by: FAMILY MEDICINE

## 2019-05-20 PROCEDURE — G0105 COLORECTAL SCRN; HI RISK IND: HCPCS | Performed by: FAMILY MEDICINE

## 2019-05-20 PROCEDURE — 37000009 ZZH ANESTHESIA TECHNICAL FEE, EACH ADDTL 15 MIN: Performed by: FAMILY MEDICINE

## 2019-05-20 PROCEDURE — 25800030 ZZH RX IP 258 OP 636: Performed by: NURSE ANESTHETIST, CERTIFIED REGISTERED

## 2019-05-20 PROCEDURE — 37000008 ZZH ANESTHESIA TECHNICAL FEE, 1ST 30 MIN: Performed by: FAMILY MEDICINE

## 2019-05-20 PROCEDURE — 25000125 ZZHC RX 250: Performed by: NURSE ANESTHETIST, CERTIFIED REGISTERED

## 2019-05-20 PROCEDURE — 25000128 H RX IP 250 OP 636: Performed by: NURSE ANESTHETIST, CERTIFIED REGISTERED

## 2019-05-20 RX ORDER — PROPOFOL 10 MG/ML
INJECTION, EMULSION INTRAVENOUS CONTINUOUS PRN
Status: DISCONTINUED | OUTPATIENT
Start: 2019-05-20 | End: 2019-05-20

## 2019-05-20 RX ORDER — SODIUM CHLORIDE, SODIUM LACTATE, POTASSIUM CHLORIDE, CALCIUM CHLORIDE 600; 310; 30; 20 MG/100ML; MG/100ML; MG/100ML; MG/100ML
INJECTION, SOLUTION INTRAVENOUS CONTINUOUS
Status: DISCONTINUED | OUTPATIENT
Start: 2019-05-20 | End: 2019-05-20 | Stop reason: HOSPADM

## 2019-05-20 RX ORDER — LIDOCAINE 40 MG/G
CREAM TOPICAL
Status: DISCONTINUED | OUTPATIENT
Start: 2019-05-20 | End: 2019-05-20

## 2019-05-20 RX ORDER — PROPOFOL 10 MG/ML
INJECTION, EMULSION INTRAVENOUS PRN
Status: DISCONTINUED | OUTPATIENT
Start: 2019-05-20 | End: 2019-05-20

## 2019-05-20 RX ORDER — ONDANSETRON 4 MG/1
4 TABLET, ORALLY DISINTEGRATING ORAL EVERY 30 MIN PRN
Status: DISCONTINUED | OUTPATIENT
Start: 2019-05-20 | End: 2019-05-20 | Stop reason: HOSPADM

## 2019-05-20 RX ORDER — MEPERIDINE HYDROCHLORIDE 25 MG/ML
12.5 INJECTION INTRAMUSCULAR; INTRAVENOUS; SUBCUTANEOUS
Status: DISCONTINUED | OUTPATIENT
Start: 2019-05-20 | End: 2019-05-20 | Stop reason: HOSPADM

## 2019-05-20 RX ORDER — LIDOCAINE 40 MG/G
CREAM TOPICAL
Status: DISCONTINUED | OUTPATIENT
Start: 2019-05-20 | End: 2019-05-20 | Stop reason: HOSPADM

## 2019-05-20 RX ORDER — ONDANSETRON 2 MG/ML
4 INJECTION INTRAMUSCULAR; INTRAVENOUS
Status: DISCONTINUED | OUTPATIENT
Start: 2019-05-20 | End: 2019-05-20 | Stop reason: HOSPADM

## 2019-05-20 RX ORDER — NALOXONE HYDROCHLORIDE 0.4 MG/ML
.1-.4 INJECTION, SOLUTION INTRAMUSCULAR; INTRAVENOUS; SUBCUTANEOUS
Status: DISCONTINUED | OUTPATIENT
Start: 2019-05-20 | End: 2019-05-20 | Stop reason: HOSPADM

## 2019-05-20 RX ORDER — LIDOCAINE HYDROCHLORIDE 20 MG/ML
INJECTION, SOLUTION INFILTRATION; PERINEURAL PRN
Status: DISCONTINUED | OUTPATIENT
Start: 2019-05-20 | End: 2019-05-20

## 2019-05-20 RX ORDER — ONDANSETRON 2 MG/ML
4 INJECTION INTRAMUSCULAR; INTRAVENOUS EVERY 30 MIN PRN
Status: DISCONTINUED | OUTPATIENT
Start: 2019-05-20 | End: 2019-05-20 | Stop reason: HOSPADM

## 2019-05-20 RX ADMIN — PROPOFOL 150 MCG/KG/MIN: 10 INJECTION, EMULSION INTRAVENOUS at 10:20

## 2019-05-20 RX ADMIN — PROPOFOL 60 MG: 10 INJECTION, EMULSION INTRAVENOUS at 10:22

## 2019-05-20 RX ADMIN — LIDOCAINE HYDROCHLORIDE 5 ML: 10 INJECTION, SOLUTION EPIDURAL; INFILTRATION; INTRACAUDAL; PERINEURAL at 09:51

## 2019-05-20 RX ADMIN — SODIUM CHLORIDE, POTASSIUM CHLORIDE, SODIUM LACTATE AND CALCIUM CHLORIDE: 600; 310; 30; 20 INJECTION, SOLUTION INTRAVENOUS at 09:50

## 2019-05-20 RX ADMIN — LIDOCAINE HYDROCHLORIDE 40 MG: 20 INJECTION, SOLUTION INFILTRATION; PERINEURAL at 10:22

## 2019-05-20 NOTE — H&P
"Pre-Endoscopy History and Physical     Dav Seay MRN# [id number]   YOB: 1950 Age: 69 year old     Date of Procedure: 5/20/2019  Primary care provider: Ryan Perera  Type of Endoscopy: colonoscopy  Type of Anesthesia Anticipated: MAC     HPI:    Dav is a 69 year old male who will be undergoing a Screening procedure.      Dav is feeling well today. Can get up a single flight of stairs without dyspnea. Estimated METS > 4.     Patient Active Problem List   Diagnosis     CARDIOVASCULAR SCREENING; LDL GOAL LESS THAN 160     Hypertension goal BP (blood pressure) < 140/90     Advance Care Planning     Insomnia, transient     Carpal tunnel syndrome of left wrist     Carpal tunnel syndrome of right wrist     CKD (chronic kidney disease) stage 3, GFR 30-59 ml/min (H)        No medications prior to admission.        REVIEW OF SYSTEMS:     5 point ROS negative except as noted above in HPI, including Gen., Resp., CV, GI &  system review.      PHYSICAL EXAM:   /77   Pulse 54   Temp 98.1  F (36.7  C) (Oral)   Resp 16   SpO2 93%    Estimated body mass index is 31.05 kg/m  as calculated from the following:    Height as of 4/5/19: 1.791 m (5' 10.5\").    Weight as of 4/5/19: 99.6 kg (219 lb 8 oz).    GENERAL APPEARANCE: alert and no distress  RESP: lungs clear and unlabored  CV: regular  ABD: soft, nt/nd    DIAGNOSTICS:    Not indicated      IMPRESSION   ASA Class 2 - Mild systemic disease        PLAN:     Plan for colonoscopy. No medical contraindications to proceed, or further work up needed. The risks and benefits of the procedure and the sedation options and risks were discussed with the patient. These include infection, bleeding, and small risk of colon perforation (1/1000 to 1/95447 depending on patient characteristics and type of procedure). Dav was also explained to alternatives for colo-rectal screening. All questions were answered and informed consent was obtained.      The above has " been forwarded to the consulting provider.      Signed Electronically by: Jesus Harris  May 20, 2019

## 2019-05-20 NOTE — ANESTHESIA CARE TRANSFER NOTE
Patient: Dav Seay    Procedure(s):  COLONOSCOPY    Diagnosis: screening  Diagnosis Additional Information: No value filed.    Anesthesia Type:   MAC     Note:  Airway :Face Mask  Patient transferred to:Phase II  Comments: lavonne Kenneyoff Report: Identifed the Patient, Identified the Reponsible Provider, Reviewed the pertinent medical history, Discussed the surgical course, Reviewed Intra-OP anesthesia mangement and issues during anesthesia, Set expectations for post-procedure period and Allowed opportunity for questions and acknowledgement of understanding      Vitals: (Last set prior to Anesthesia Care Transfer)    CRNA VITALS  5/20/2019 1012 - 5/20/2019 1049      5/20/2019             SpO2:  96 %    Resp Rate (observed):  17                Electronically Signed By: BUCK Duran CRNA  May 20, 2019  10:49 AM

## 2019-05-20 NOTE — ANESTHESIA PREPROCEDURE EVALUATION
Anesthesia Pre-Procedure Evaluation    Patient: Dav Seay   MRN: [id number] : 1950          Preoperative Diagnosis: screening    Procedure(s):  COLONOSCOPY    Past Medical History:   Diagnosis Date     Hypertension      Traumatic amputation of other finger(s) (complete) (partial), without mention of complication     Amputated RIght 2nd finger     Past Surgical History:   Procedure Laterality Date     COLONOSCOPY  6/10/2013    Procedure: COLONOSCOPY;  Colonoscopy;  Surgeon: Wes Pablo MD;  Location:  GI     HC COLONOSCOPY THRU STOMA W BIOPSY/CAUTERY TUMOR/POLYP/LESION      hyperplastic polyp repeat in .     HC REMOVAL OF TONSILS,<13 Y/O  5 years old    Tonsils <14y.o.     HERNIA REPAIR, INGUINAL RT/LT       JOINT REPLACEMENT, HIP RT/LT  11    Left total hip arthroplasty. [facility]     JOINT REPLACEMENT, HIP RT/LT  2017    Right tota hip arthroplasty. [facility]     RELEASE CARPAL TUNNEL Left 10/18/2017    Procedure: RELEASE CARPAL TUNNEL;  Left Carpal Tunnel Release;  Surgeon: Norris Townsend MD;  Location: [facility] OR       Anesthesia Evaluation     . Pt has had prior anesthetic. Type: MAC and General    No history of anesthetic complications          ROS/MED HX    ENT/Pulmonary:  - neg pulmonary ROS     Neurologic:  - neg neurologic ROS     Cardiovascular:     (+) hypertension----. : . . . :. . Previous cardiac testing date:results:date: results:ECG reviewed date:17 results:SB, 46bpm date: results:          METS/Exercise Tolerance:  >4 METS   Hematologic:  - neg hematologic  ROS       Musculoskeletal:   (+) arthritis,  -       GI/Hepatic:  - neg GI/hepatic ROS       Renal/Genitourinary:     (+) chronic renal disease, type: CRI, Pt does not require dialysis, Pt has no history of transplant,       Endo:  - neg endo ROS       Psychiatric:  - neg psychiatric ROS       Infectious Disease:  - neg infectious disease ROS       Malignancy:      - no  "malignancy   Other:    - neg other ROS                      Physical Exam  Normal systems: cardiovascular, pulmonary and dental    Airway   Mallampati: II  TM distance: >3 FB  Neck ROM: full    Dental     Cardiovascular   Rhythm and rate: regular and normal      Pulmonary    breath sounds clear to auscultation            Lab Results   Component Value Date    WBC 6.5 12/08/2017    HGB 15.4 12/08/2017    HCT 45.9 12/08/2017     12/08/2017     04/05/2019    POTASSIUM 4.7 04/05/2019    CHLORIDE 107 04/05/2019    CO2 24 04/05/2019    BUN 25 04/05/2019    CR 1.23 04/05/2019     (H) 04/05/2019    ZELALEM 9.4 04/05/2019    ALT 41 03/16/2017    AST 29 03/16/2017    INR 2.08 (H) 01/10/2012    TSH 1.70 02/07/2009       Preop Vitals  BP Readings from Last 3 Encounters:   05/20/19 136/83   04/05/19 130/80   02/08/18 114/72    Pulse Readings from Last 3 Encounters:   04/05/19 94   02/08/18 58   01/08/18 74      Resp Readings from Last 3 Encounters:   05/20/19 16   04/05/19 16   02/08/18 16    SpO2 Readings from Last 3 Encounters:   05/20/19 98%   04/05/19 99%   02/08/18 99%      Temp Readings from Last 1 Encounters:   05/20/19 98.1  F (36.7  C) (Oral)    Ht Readings from Last 1 Encounters:   04/05/19 1.791 m (5' 10.5\")      Wt Readings from Last 1 Encounters:   04/05/19 99.6 kg (219 lb 8 oz)    Estimated body mass index is 31.05 kg/m  as calculated from the following:    Height as of 4/5/19: 1.791 m (5' 10.5\").    Weight as of 4/5/19: 99.6 kg (219 lb 8 oz).       Anesthesia Plan      History & Physical Review  History and physical reviewed and following examination; no interval change.    ASA Status:  3 .    NPO Status:  > 4 hours    Plan for MAC with Propofol induction. Maintenance will be TIVA.  Reason for MAC:  Deep or markedly invasive procedure (G8)    Dr. Harris to complete H&P prior to anesthesia induction      Postoperative Care      Consents  Anesthetic plan, risks, benefits and alternatives discussed " with:  Patient.  Use of blood products discussed: No .   .                 BUCK Duran CRNA

## 2019-05-20 NOTE — DISCHARGE INSTRUCTIONS
[facility]    Home Care Following Endoscopy  Dr. Harris          Activity:    You have just undergone an endoscopic procedure usually performed with conscious sedation.  Do not work or operate machinery (including a car) for at least 12 hours.      I encourage you to walk and attempt to pass this air as soon as possible.    Diet:    Return to the diet you were on before your procedure but eat lightly for the first 12-24 hours.    Drink plenty of water.    Resume any regular medications unless otherwise advised by your physician.  Please begin any new medication prescribed as a result of your procedure as directed by your physician.     If you had any biopsy or polyp removed please refrain from aspirin or aspirin products for 2 days.  If on Coumadin please restart as instructed by your physician.   Pain:    You may take Tylenol as needed for pain.  Expected Recovery:    You can expect some mild abdominal fullness and/or discomfort due to the air used to inflate your intestinal tract. It is also normal to have a mild sore throat after upper endoscopy.    Call Your Physician if You Have:    After Colonoscopy:  o Worsening persisting abdominal pain which is worse with activity.  o Fevers (>101 degrees F), chills or shakes.  o Passage of continued blood with bowel movements.     Any questions or concerns about your recovery, please call [phone] or after hours [phone] Nurse Advice Line.    Follow-up Care:  You had a clean colon.  No polyps or specimens removed.

## 2019-05-20 NOTE — ANESTHESIA POSTPROCEDURE EVALUATION
Patient: Dav Seay    Procedure(s):  COLONOSCOPY    Diagnosis:screening  Diagnosis Additional Information: No value filed.    Anesthesia Type:  MAC    Note:  Anesthesia Post Evaluation    Patient location during evaluation: Phase 2  Patient participation: Able to fully participate in evaluation  Level of consciousness: awake and alert  Pain management: adequate  Airway patency: patent  Cardiovascular status: acceptable and stable  Respiratory status: acceptable and room air  Hydration status: acceptable  PONV: none     Anesthetic complications: None    Comments:  Patient was happy with the anesthesia care received and no anesthesia related complications were noted.  I will follow up with the patient again if it is needed.        Last vitals:  Vitals:    05/20/19 0942 05/20/19 1043   BP: 136/83 (!) 88/55   Resp: 16 16   Temp: 98.1  F (36.7  C)    SpO2: 98% 96%         Electronically Signed By: BUCK Duran CRNA  May 20, 2019  10:54 AM

## 2019-09-28 ENCOUNTER — HEALTH MAINTENANCE LETTER (OUTPATIENT)
Age: 69
End: 2019-09-28

## 2020-02-18 ENCOUNTER — TRANSFERRED RECORDS (OUTPATIENT)
Dept: MULTI SPECIALTY CLINIC | Facility: CLINIC | Age: 70
End: 2020-02-18

## 2020-02-18 LAB
CHOLEST SERPL-MCNC: 124 MG/DL
CREAT SERPL-MCNC: 1.4 MG/DL (ref 0.71–1.35)
GFR SERPL CREATININE-BSD FRML MDRD: 52 ML/MIN/BSA
GLUCOSE SERPL-MCNC: 108 MG/DL (ref 70–100)
HDLC SERPL-MCNC: 43 MG/DL
TSH SERPL-ACNC: 3.5 MIU/L (ref 0.3–4.2)

## 2020-03-03 ENCOUNTER — HOSPITAL ENCOUNTER (EMERGENCY)
Facility: CLINIC | Age: 70
Discharge: HOME | End: 2020-03-03
Attending: EMERGENCY MEDICINE | Admitting: EMERGENCY MEDICINE
Payer: MEDICARE

## 2020-03-03 VITALS
RESPIRATION RATE: 12 BRPM | WEIGHT: 222 LBS | HEART RATE: 57 BPM | TEMPERATURE: 96.7 F | BODY MASS INDEX: 31.4 KG/M2 | OXYGEN SATURATION: 97 % | DIASTOLIC BLOOD PRESSURE: 81 MMHG | SYSTOLIC BLOOD PRESSURE: 122 MMHG

## 2020-03-03 DIAGNOSIS — I48.91 ATRIAL FIBRILLATION WITH RVR (H): ICD-10-CM

## 2020-03-03 LAB
ALBUMIN SERPL-MCNC: 4.2 G/DL (ref 3.4–5)
ALP SERPL-CCNC: 65 U/L (ref 40–150)
ALT SERPL W P-5'-P-CCNC: 26 U/L (ref 0–70)
ANION GAP SERPL CALCULATED.3IONS-SCNC: 8 MMOL/L (ref 3–14)
AST SERPL W P-5'-P-CCNC: 20 U/L (ref 0–45)
BASOPHILS # BLD AUTO: 0.1 10E9/L (ref 0–0.2)
BASOPHILS NFR BLD AUTO: 0.8 %
BILIRUB SERPL-MCNC: 1 MG/DL (ref 0.2–1.3)
BUN SERPL-MCNC: 27 MG/DL (ref 7–30)
CALCIUM SERPL-MCNC: 9.2 MG/DL (ref 8.5–10.1)
CHLORIDE SERPL-SCNC: 106 MMOL/L (ref 94–109)
CO2 SERPL-SCNC: 25 MMOL/L (ref 20–32)
CREAT SERPL-MCNC: 1.38 MG/DL (ref 0.66–1.25)
DIFFERENTIAL METHOD BLD: NORMAL
EOSINOPHIL NFR BLD AUTO: 2.4 %
ERYTHROCYTE [DISTWIDTH] IN BLOOD BY AUTOMATED COUNT: 13 % (ref 10–15)
GFR SERPL CREATININE-BSD FRML MDRD: 52 ML/MIN/{1.73_M2}
GLUCOSE SERPL-MCNC: 114 MG/DL (ref 70–99)
HCT VFR BLD AUTO: 49.7 % (ref 40–53)
HGB BLD-MCNC: 16.8 G/DL (ref 13.3–17.7)
IMM GRANULOCYTES # BLD: 0 10E9/L (ref 0–0.4)
IMM GRANULOCYTES NFR BLD: 0.4 %
INR PPP: 0.97 (ref 0.86–1.14)
LYMPHOCYTES # BLD AUTO: 1.2 10E9/L (ref 0.8–5.3)
LYMPHOCYTES NFR BLD AUTO: 15.1 %
MCH RBC QN AUTO: 31.8 PG (ref 26.5–33)
MCHC RBC AUTO-ENTMCNC: 33.8 G/DL (ref 31.5–36.5)
MCV RBC AUTO: 94 FL (ref 78–100)
MONOCYTES # BLD AUTO: 0.7 10E9/L (ref 0–1.3)
MONOCYTES NFR BLD AUTO: 8.9 %
NEUTROPHILS # BLD AUTO: 5.7 10E9/L (ref 1.6–8.3)
NEUTROPHILS NFR BLD AUTO: 72.4 %
NRBC # BLD AUTO: 0 10*3/UL
NRBC BLD AUTO-RTO: 0 /100
PLATELET # BLD AUTO: 251 10E9/L (ref 150–450)
POTASSIUM SERPL-SCNC: 4.4 MMOL/L (ref 3.4–5.3)
PROT SERPL-MCNC: 7.2 G/DL (ref 6.8–8.8)
RBC # BLD AUTO: 5.29 10E12/L (ref 4.4–5.9)
SODIUM SERPL-SCNC: 139 MMOL/L (ref 133–144)
TROPONIN I SERPL-MCNC: <0.015 UG/L (ref 0–0.04)
TROPONIN I SERPL-MCNC: <0.015 UG/L (ref 0–0.04)
TSH SERPL DL<=0.005 MIU/L-ACNC: 1.85 MU/L (ref 0.4–4)
WBC # BLD AUTO: 7.9 10E9/L (ref 4–11)

## 2020-03-03 PROCEDURE — 93005 ELECTROCARDIOGRAM TRACING: CPT | Mod: 76 | Performed by: EMERGENCY MEDICINE

## 2020-03-03 PROCEDURE — 93010 ELECTROCARDIOGRAM REPORT: CPT | Mod: Z6 | Performed by: EMERGENCY MEDICINE

## 2020-03-03 PROCEDURE — 84484 ASSAY OF TROPONIN QUANT: CPT | Performed by: EMERGENCY MEDICINE

## 2020-03-03 PROCEDURE — 80053 COMPREHEN METABOLIC PANEL: CPT | Performed by: EMERGENCY MEDICINE

## 2020-03-03 PROCEDURE — 93010 ELECTROCARDIOGRAM REPORT: CPT | Mod: 76 | Performed by: EMERGENCY MEDICINE

## 2020-03-03 PROCEDURE — 93005 ELECTROCARDIOGRAM TRACING: CPT | Performed by: EMERGENCY MEDICINE

## 2020-03-03 PROCEDURE — 84443 ASSAY THYROID STIM HORMONE: CPT | Performed by: EMERGENCY MEDICINE

## 2020-03-03 PROCEDURE — 85610 PROTHROMBIN TIME: CPT | Performed by: EMERGENCY MEDICINE

## 2020-03-03 PROCEDURE — 25000125 ZZHC RX 250: Performed by: EMERGENCY MEDICINE

## 2020-03-03 PROCEDURE — 99285 EMERGENCY DEPT VISIT HI MDM: CPT | Mod: 25 | Performed by: EMERGENCY MEDICINE

## 2020-03-03 PROCEDURE — 84484 ASSAY OF TROPONIN QUANT: CPT | Mod: 91 | Performed by: EMERGENCY MEDICINE

## 2020-03-03 PROCEDURE — 85025 COMPLETE CBC W/AUTO DIFF WBC: CPT | Performed by: EMERGENCY MEDICINE

## 2020-03-03 PROCEDURE — 96374 THER/PROPH/DIAG INJ IV PUSH: CPT | Performed by: EMERGENCY MEDICINE

## 2020-03-03 PROCEDURE — 99284 EMERGENCY DEPT VISIT MOD MDM: CPT | Mod: 25 | Performed by: EMERGENCY MEDICINE

## 2020-03-03 RX ORDER — METOPROLOL TARTRATE 1 MG/ML
5 INJECTION, SOLUTION INTRAVENOUS EVERY 5 MIN PRN
Status: DISCONTINUED | OUTPATIENT
Start: 2020-03-03 | End: 2020-03-03 | Stop reason: HOSPADM

## 2020-03-03 RX ADMIN — METOPROLOL TARTRATE 5 MG: 5 INJECTION INTRAVENOUS at 08:24

## 2020-03-03 NOTE — ED TRIAGE NOTES
Pt comes in with complaints of tachycardia that started around 0600 today. Pt states he was taken off metoprolol and increased his dose of lisinopril about 2 weeks ago. Pt complains of diaphoresis, but denies CP.

## 2020-03-03 NOTE — ED AVS SNAPSHOT
[facility] Emergency Department  [address] [place] [address]  Phone:  [phone]  Fax:  [phone]                                    Dav Seay   MRN: [id number]    Department:  [facility] Emergency Department   Date of Visit:  3/3/2020           After Visit Summary Signature Page    I have received my discharge instructions, and my questions have been answered. I have discussed any challenges I see with this plan with the nurse or doctor.    ..........................................................................................................................................  Patient/Patient Representative Signature      ..........................................................................................................................................  Patient Representative Print Name and Relationship to Patient    ..................................................               ................................................  Date                                   Time    ..........................................................................................................................................  Reviewed by Signature/Title    ...................................................              ..............................................  Date                                               Time          22EPIC Rev 08/18

## 2020-03-03 NOTE — DISCHARGE INSTRUCTIONS
Restart your metoprolol 25 mg twice daily.  Decrease your lisinopril to 5 mg daily.  Continue your aspirin.  Monitor your blood pressure and if it is getting too low or you are developing lightheadedness or weakness you can cut back on your lisinopril to 2.5 mg which you previously took.  Follow-up with cardiology to discuss your blood pressure medicines and recent presentation for atrial fibrillation.

## 2020-03-03 NOTE — ED PROVIDER NOTES
History     Chief Complaint   Patient presents with     Palpitations     HPI  Dav Seay is a 69 year old male who presents with his heart racing this morning.  Began around 6 AM.  He has had previous similar episodes in the past but never this extent or long.  Was previously on metoprolol and lisinopril for blood pressure.  2 weeks ago his metoprolol was discontinued and his lisinopril was increased from 2.5 to 10 mg for blood pressure control.  He states the pressures been in the 140s over 90s.  He has high cholesterol but controlled with Lipitor.  Denies previous personal cardiac history.  Did have a recent stress test that showed no ischemic changes.  Does have chronic kidney disease and hypertension.  Denies tobacco use.  Does admit to drinking 2 cups of coffee this morning which is not unusual for him.  Denies other stimulant use.  No recent illness, chest pain, shortness of breath or cough.  Currently denies any nausea or abdominal pain.  No leg pain or swelling.  Denies personal or family history of DVT or PE.  On presentation patient was tachycardic in the 120-140 range and appeared to be in A. fib.  He briefly decreased to 60 and looked regular at that time.  That was brief and he rebounded back into A. fib.    Allergies:  Allergies   Allergen Reactions     No Known Drug Allergies        Problem List:    Patient Active Problem List    Diagnosis Date Noted     CKD (chronic kidney disease) stage 3, GFR 30-59 ml/min (H) 01/08/2018     Priority: Medium     Carpal tunnel syndrome of left wrist 07/25/2017     Priority: Medium     Carpal tunnel syndrome of right wrist 07/25/2017     Priority: Medium     Insomnia, transient 12/02/2013     Priority: Medium     Advance Care Planning 11/30/2012     Priority: Medium     Advance Care Planning 9/16/2015: Receipt of ACP document:  Received: Health Care Directive which was witnessed or notarized on 8/29/15.  Document not previously scanned.  Validation form  completed and sent with document to be scanned.  Code Status needs to be updated to reflect choices in most recent ACP document. Confirmed/documented designated decision maker(s).  Added by Tara Davis RN, BSN, MA, Advance Care Planning Liaison.  Advance Care Planning 11/30/12: Patient states has Advance Directive and will bring in a copy to clinic. 11/30/2012  K.Kluge/RMA          Hypertension goal BP (blood pressure) < 140/90 02/15/2012     Priority: Medium     CARDIOVASCULAR SCREENING; LDL GOAL LESS THAN 160 10/31/2010     Priority: Medium        Past Medical History:    Past Medical History:   Diagnosis Date     Hypertension      Traumatic amputation of other finger(s) (complete) (partial), without mention of complication 1975       Past Surgical History:    Past Surgical History:   Procedure Laterality Date     COLONOSCOPY  6/10/2013    Procedure: COLONOSCOPY;  Colonoscopy;  Surgeon: Wes Pablo MD;  Location:  GI     COLONOSCOPY N/A 5/20/2019    Procedure: COLONOSCOPY;  Surgeon: Jesus Harris MD;  Location:  GI     HC COLONOSCOPY THRU STOMA W BIOPSY/CAUTERY TUMOR/POLYP/LESION  2003    hyperplastic polyp repeat in 2008.     HC REMOVAL OF TONSILS,<13 Y/O  5 years old    Tonsils <14y.o.     HERNIA REPAIR, INGUINAL RT/LT       JOINT REPLACEMENT, HIP RT/LT  12/19/11    Left total hip arthroplasty. [facility]     JOINT REPLACEMENT, HIP RT/LT  12/18/2017    Right tota hip arthroplasty. [facility]     RELEASE CARPAL TUNNEL Left 10/18/2017    Procedure: RELEASE CARPAL TUNNEL;  Left Carpal Tunnel Release;  Surgeon: Norris Townsend MD;  Location:  OR       Family History:    Family History   Problem Relation Age of Onset     Heart Disease Mother         Tachycardia     Osteoporosis Mother      Diabetes Father      Cancer Father         skin, lymph and colon CA     Arthritis Brother      Anesthesia Reaction No family hx of        Social History:  Marital Status:    [2]  Social History     Tobacco Use     Smoking status: Never Smoker     Smokeless tobacco: Never Used   Substance Use Topics     Alcohol use: Yes     Comment: occasional     Drug use: No        Medications:    ASPIRIN PO  atorvastatin (LIPITOR) 20 MG tablet  bisacodyl (DULCOLAX) 5 MG EC tablet  celecoxib (CELEBREX) 200 MG capsule  lisinopril (PRINIVIL/ZESTRIL) 2.5 MG tablet  MELATONIN PO  metoprolol tartrate (LOPRESSOR) 25 MG tablet  polyethylene glycol (GOLYTELY/NULYTELY) 236 g suspension          Review of Systems all other systems are reviewed and are negative.    Physical Exam   BP: (!) 159/117  Pulse: 147  Heart Rate: 120  Temp: 96.7  F (35.9  C)  Resp: 16  Weight: 100.7 kg (222 lb)  SpO2: 99 %      Physical Exam alert cooperative male in mild distress.  HEENT shows no proptosis.  No facial asymmetry or droop.  Speech is clear.  Neck is supple.  No JVD or bruits.  No thyromegaly.  Lungs were clear without adventitious sounds.  Cardiac auscultation reveals irregularly irregular and rapid in the 140s.  No murmurs appreciated.  Abdomen is obese but benign to palpation.  Extremities reveal no edema, calf or thigh tenderness, and Homans is negative.    ED Course        Procedures               EKG Interpretation:      Interpreted by Mayo Corado MD  Time reviewed: 8:06  Symptoms at time of EKG: Palpitations  Rhythm: atrial fibrillation - rapid  Rate: Tachycardia  Axis: Normal  Ectopy: none  Conduction: normal  ST Segments/ T Waves: Non-specific ST-T wave changes  Q Waves: none  Comparison to prior: Compared to 12/17 patient was in a normal sinus rhythm and was bradycardic.    Clinical Impression: atrial fibrillation (new onset)                Critical Care time:  none               Results for orders placed or performed during the hospital encounter of 03/03/20 (from the past 24 hour(s))   CBC with platelets differential   Result Value Ref Range    WBC 7.9 4.0 - 11.0 10e9/L    RBC Count 5.29 4.4 - 5.9 10e12/L     Hemoglobin 16.8 13.3 - 17.7 g/dL    Hematocrit 49.7 40.0 - 53.0 %    MCV 94 78 - 100 fl    MCH 31.8 26.5 - 33.0 pg    MCHC 33.8 31.5 - 36.5 g/dL    RDW 13.0 10.0 - 15.0 %    Platelet Count 251 150 - 450 10e9/L    Diff Method Automated Method     % Neutrophils 72.4 %    % Lymphocytes 15.1 %    % Monocytes 8.9 %    % Eosinophils 2.4 %    % Basophils 0.8 %    % Immature Granulocytes 0.4 %    Nucleated RBCs 0 0 /100    Absolute Neutrophil 5.7 1.6 - 8.3 10e9/L    Absolute Lymphocytes 1.2 0.8 - 5.3 10e9/L    Absolute Monocytes 0.7 0.0 - 1.3 10e9/L    Absolute Basophils 0.1 0.0 - 0.2 10e9/L    Abs Immature Granulocytes 0.0 0 - 0.4 10e9/L    Absolute Nucleated RBC 0.0    INR   Result Value Ref Range    INR 0.97 0.86 - 1.14   Comprehensive metabolic panel   Result Value Ref Range    Sodium 139 133 - 144 mmol/L    Potassium 4.4 3.4 - 5.3 mmol/L    Chloride 106 94 - 109 mmol/L    Carbon Dioxide 25 20 - 32 mmol/L    Anion Gap 8 3 - 14 mmol/L    Glucose 114 (H) 70 - 99 mg/dL    Urea Nitrogen 27 7 - 30 mg/dL    Creatinine 1.38 (H) 0.66 - 1.25 mg/dL    GFR Estimate 52 (L) >60 mL/min/[1.73_m2]    GFR Estimate If Black 60 (L) >60 mL/min/[1.73_m2]    Calcium 9.2 8.5 - 10.1 mg/dL    Bilirubin Total 1.0 0.2 - 1.3 mg/dL    Albumin 4.2 3.4 - 5.0 g/dL    Protein Total 7.2 6.8 - 8.8 g/dL    Alkaline Phosphatase 65 40 - 150 U/L    ALT 26 0 - 70 U/L    AST 20 0 - 45 U/L   Troponin I   Result Value Ref Range    Troponin I ES <0.015 0.000 - 0.045 ug/L   TSH with free T4 reflex   Result Value Ref Range    TSH 1.85 0.40 - 4.00 mU/L   Troponin I   Result Value Ref Range    Troponin I ES <0.015 0.000 - 0.045 ug/L       Medications   metoprolol (LOPRESSOR) injection 5 mg (5 mg Intravenous Given 3/3/20 0824)     Blood work is ordered.  EKG shows A. fib with RVR.  Previous EKGs show sinus rhythm and bradycardia.  IV was established and Lopressor was ordered as he been on metoprolol previously.  Before this could be given patient did convert back into  a sinus rhythm at 80.  EKG was obtained and showed.  We will opt to give him a beta-blocker to prevent recurrence.  Since he is on metoprolol he was given Lopressor IV.    Repeat EKG shows a normal sinus rhythm.  Rate of 74 bpm.  No acute ischemic changes.  Compared to previous EKG done back in December 2017 there are no acute changes.  And in a sinus rhythm.  No complaints during the ER stay.  Repeat troponin was normal.  Assessments & Plan (with Medical Decision Making)   Dav Seay is a 69 year old male who presents with his heart racing this morning.  Began around 6 AM.  He has had previous similar episodes in the past but never this extent or long.  Was previously on metoprolol and lisinopril for blood pressure.  2 weeks ago his metoprolol was discontinued and his lisinopril was increased from 2.5 to 10 mg for blood pressure control.  He states the pressures been in the 140s over 90s.  He has high cholesterol but controlled with Lipitor.  Denies previous personal cardiac history.  Did have a recent stress test that showed no ischemic changes.  Does have chronic kidney disease and hypertension.  Denies tobacco use.  Does admit to drinking 2 cups of coffee this morning which is not unusual for him.  Denies other stimulant use.  No recent illness, chest pain, shortness of breath or cough.  Currently denies any nausea or abdominal pain.  No leg pain or swelling.  Denies personal or family history of DVT or PE.  On presentation patient was tachycardic in the 120-140 range and appeared to be in A. fib.  He briefly decreased to 60 and looked regular at that time.  That was brief and he rebounded back into A. fib.  On presentation patient was in A. fib with RVR.  Rate in the 140s.  He was hypertensive.  His exam revealed no thyromegaly.  No adventitious lung sounds.  He was tachycardic and irregular on auscultation but had no murmur.  Benign abdomen.  No leg pain or swelling.  Initial EKG did show A. fib RVR.  He  spontaneous converted to a normal sinus rhythm.  With him converting in and out of sinus to A. fib we did give him a dose of Lopressor we will reinstitute his metoprolol.  Blood work showed.  Recommend he follow-up with his cardiologist in the next week to discuss med management for his blood pressure and suspected recurrent A. fib.  I have reviewed the nursing notes.    I have reviewed the findings, diagnosis, plan and need for follow up with the patient.       New Prescriptions    No medications on file       Final diagnoses:   Atrial fibrillation with RVR (H)       3/3/2020   [facility] EMERGENCY DEPARTMENT     Mayo Corado MD  03/03/20 7319

## 2020-03-05 ENCOUNTER — TELEPHONE (OUTPATIENT)
Dept: FAMILY MEDICINE | Facility: OTHER | Age: 70
End: 2020-03-05

## 2020-03-05 DIAGNOSIS — N18.30 CKD (CHRONIC KIDNEY DISEASE) STAGE 3, GFR 30-59 ML/MIN (H): ICD-10-CM

## 2020-03-05 DIAGNOSIS — I10 HYPERTENSION GOAL BP (BLOOD PRESSURE) < 140/90: ICD-10-CM

## 2020-03-05 RX ORDER — LISINOPRIL 2.5 MG/1
TABLET ORAL
Qty: 90 TABLET | Refills: 3 | Status: SHIPPED | OUTPATIENT
Start: 2020-03-05 | End: 2020-03-05

## 2020-03-05 RX ORDER — LISINOPRIL 5 MG/1
5 TABLET ORAL DAILY
Qty: 90 TABLET | Refills: 4 | Status: SHIPPED | OUTPATIENT
Start: 2020-03-05 | End: 2020-06-15

## 2020-03-05 NOTE — TELEPHONE ENCOUNTER
"lisinopri'  Last Written Prescription Date:  04/05/2019  Last Fill Quantity: 90,  # refills: 3   Last office visit: 4/5/2019 with prescribing provider:  Trever  Future Office Visit:  None    Requested Prescriptions   Pending Prescriptions Disp Refills     lisinopril (ZESTRIL) 2.5 MG tablet [Pharmacy Med Name: LISINOPRIL 2.5MG TABLET] 90 tablet 3     Sig: TAKE 1 TABLET BY MOUTH EVERY DAY       ACE Inhibitors (Including Combos) Protocol Failed - 3/5/2020  1:16 PM        Failed - Normal serum creatinine on file in past 12 months     Recent Labs   Lab Test 03/03/20  0816   CR 1.38*             Passed - Blood pressure under 140/90 in past 12 months     BP Readings from Last 3 Encounters:   03/03/20 122/81   05/20/19 109/77   04/05/19 130/80                 Passed - Recent (12 mo) or future (30 days) visit within the authorizing provider's specialty     Patient has had an office visit with the authorizing provider or a provider within the authorizing providers department within the previous 12 mos or has a future within next 30 days. See \"Patient Info\" tab in inbasket, or \"Choose Columns\" in Meds & Orders section of the refill encounter.              Passed - Medication is active on med list        Passed - Patient is age 18 or older        Passed - Normal serum potassium on file in past 12 months     Recent Labs   Lab Test 03/03/20  0816   POTASSIUM 4.4               "

## 2020-03-05 NOTE — TELEPHONE ENCOUNTER
Routing refill request to provider for review/approval because:  Labs out of range:  creatnine  Dosage change  Diane Gonzalez RN BSN

## 2020-03-09 DIAGNOSIS — M25.50 ARTHRALGIA, UNSPECIFIED JOINT: ICD-10-CM

## 2020-03-09 DIAGNOSIS — N18.30 CKD (CHRONIC KIDNEY DISEASE) STAGE 3, GFR 30-59 ML/MIN (H): ICD-10-CM

## 2020-03-09 DIAGNOSIS — I10 HYPERTENSION GOAL BP (BLOOD PRESSURE) < 140/90: ICD-10-CM

## 2020-03-09 DIAGNOSIS — E78.5 HYPERLIPIDEMIA LDL GOAL <100: ICD-10-CM

## 2020-03-09 RX ORDER — ATORVASTATIN CALCIUM 20 MG/1
20 TABLET, FILM COATED ORAL DAILY
Qty: 90 TABLET | Refills: 0 | Status: SHIPPED | OUTPATIENT
Start: 2020-03-09 | End: 2020-06-15

## 2020-03-09 RX ORDER — METOPROLOL TARTRATE 25 MG/1
TABLET, FILM COATED ORAL
Qty: 180 TABLET | Refills: 0 | Status: SHIPPED | OUTPATIENT
Start: 2020-03-09 | End: 2020-06-15

## 2020-03-09 RX ORDER — CELECOXIB 200 MG/1
CAPSULE ORAL
Qty: 90 CAPSULE | Refills: 0 | Status: SHIPPED | OUTPATIENT
Start: 2020-03-09 | End: 2020-06-15

## 2020-03-09 NOTE — TELEPHONE ENCOUNTER
celecoxib (CELEBREX) 200 MG capsule [Pharmacy Med Name: CELECOXIB 200MG CAPSULE] 90 capsule 3    Sig: TAKE 1 CAPSULE BY MOUTH EVERY DAY   Routing refill request to provider for review/approval because:  Drug not on the [facility] refill protocol - AGE  Labs out of range:  CR  Lab Test 03/03/20  0816   CR 1.38*     T'd up 1 refill requests for provider review.    Michelle Schmidt RN

## 2020-03-09 NOTE — TELEPHONE ENCOUNTER
"Requested Prescriptions   Pending Prescriptions Disp Refills     atorvastatin (LIPITOR) 20 MG tablet [Pharmacy Med Name: ATORVASTATIN 20MG TABLET] 90 tablet 3     Sig: TAKE 1 TABLET (20 MG) BY MOUTH DAILY   Last Written Prescription Date:  4/5/2019  Last Fill Quantity: 90,  # refills: 3   Last office visit: 4/5/2019 with prescribing provider:     Future Office Visit:        Statins Protocol Passed - 3/9/2020  1:02 AM        Passed - LDL on file in past 12 months     Recent Labs   Lab Test 04/05/19  0936   LDL 67           Passed - No abnormal creatine kinase in past 12 months     No lab results found.           Passed - Recent (12 mo) or future (30 days) visit within the authorizing provider's specialty     Patient has had an office visit with the authorizing provider or a provider within the authorizing providers department within the previous 12 mos or has a future within next 30 days. See \"Patient Info\" tab in inbasket, or \"Choose Columns\" in Meds & Orders section of the refill encounter.            Passed - Medication is active on med list        Passed - Patient is age 18 or older      Prescription [place] per [facility] [place].       celecoxib (CELEBREX) 200 MG capsule [Pharmacy Med Name: CELECOXIB 200MG CAPSULE] 90 capsule 3     Sig: TAKE 1 CAPSULE BY MOUTH EVERY DAY   Last Written Prescription Date:  4/5/2019  Last Fill Quantity: 90,  # refills: 3   Last office visit: 4/5/2019 with prescribing provider:     Future Office Visit:        NSAID Medications Failed - 3/9/2020  1:02 AM        Failed - Patient is age 6-64 years        Failed - Normal serum creatinine on file in past 12 months     Recent Labs   Lab Test 03/03/20  0816   CR 1.38*             Passed - Blood pressure under 140/90 in past 12 months     BP Readings from Last 3 Encounters:   03/03/20 122/81   05/20/19 109/77   04/05/19 130/80           Passed - Normal ALT on file in past 12 months     Recent Labs   Lab Test 03/03/20  0816   ALT 26       " "    Passed - Normal AST on file in past 12 months     Recent Labs   Lab Test 03/03/20  0816   AST 20           Passed - Recent (12 mo) or future (30 days) visit within the authorizing provider's specialty     Patient has had an office visit with the authorizing provider or a provider within the authorizing providers department within the previous 12 mos or has a future within next 30 days. See \"Patient Info\" tab in inbasket, or \"Choose Columns\" in Meds & Orders section of the refill encounter.            Passed - Normal CBC on file in past 12 months     Recent Labs   Lab Test 03/03/20  0816   WBC 7.9   RBC 5.29   HGB 16.8   HCT 49.7              Passed - Medication is active on med list      Routing refill request to provider for review/approval          metoprolol tartrate (LOPRESSOR) 25 MG tablet [Pharmacy Med Name: METOPROLOL TARTRATE 25MG TAB] 180 tablet 3     Sig: TAKE 1 TABLET BY MOUTH TWICE A DAY   Last Written Prescription Date:  4/5/2019  Last Fill Quantity: 180,  # refills: 3   Last office visit: 4/5/2019 with prescribing provider:     Future Office Visit:        Beta-Blockers Protocol Passed - 3/9/2020  1:02 AM        Passed - Blood pressure under 140/90 in past 12 months     BP Readings from Last 3 Encounters:   03/03/20 122/81   05/20/19 109/77   04/05/19 130/80           Passed - Patient is age 6 or older        Passed - Recent (12 mo) or future (30 days) visit within the authorizing provider's specialty     Patient has had an office visit with the authorizing provider or a provider within the authorizing providers department within the previous 12 mos or has a future within next 30 days. See \"Patient Info\" tab in inbasket, or \"Choose Columns\" in Meds & Orders section of the refill encounter.            Passed - Medication is active on med list         Prescription approved per [facility] Refill Protocol.  Michelle Schmidt RN      "

## 2020-06-11 DIAGNOSIS — E78.5 HYPERLIPIDEMIA LDL GOAL <100: ICD-10-CM

## 2020-06-11 DIAGNOSIS — M25.50 ARTHRALGIA, UNSPECIFIED JOINT: ICD-10-CM

## 2020-06-11 DIAGNOSIS — N18.30 CKD (CHRONIC KIDNEY DISEASE) STAGE 3, GFR 30-59 ML/MIN (H): ICD-10-CM

## 2020-06-11 DIAGNOSIS — I10 HYPERTENSION GOAL BP (BLOOD PRESSURE) < 140/90: ICD-10-CM

## 2020-06-12 NOTE — TELEPHONE ENCOUNTER
Routing to team to set up telephone appointment for patient for medication follow up.  Please also ask patient how much medication he has left and schedule appropriately.  If patient needs a refill before their appointment, please route to PCP for completion of refill.    CLINT CortezN, RN  [facility]

## 2020-06-15 ENCOUNTER — VIRTUAL VISIT (OUTPATIENT)
Dept: FAMILY MEDICINE | Facility: CLINIC | Age: 70
End: 2020-06-15
Payer: COMMERCIAL

## 2020-06-15 DIAGNOSIS — E78.5 HYPERLIPIDEMIA LDL GOAL <100: ICD-10-CM

## 2020-06-15 DIAGNOSIS — M25.50 ARTHRALGIA, UNSPECIFIED JOINT: ICD-10-CM

## 2020-06-15 DIAGNOSIS — N18.30 CKD (CHRONIC KIDNEY DISEASE) STAGE 3, GFR 30-59 ML/MIN (H): ICD-10-CM

## 2020-06-15 DIAGNOSIS — I10 HYPERTENSION GOAL BP (BLOOD PRESSURE) < 140/90: ICD-10-CM

## 2020-06-15 PROCEDURE — 99214 OFFICE O/P EST MOD 30 MIN: CPT | Mod: 95 | Performed by: FAMILY MEDICINE

## 2020-06-15 RX ORDER — ATORVASTATIN CALCIUM 20 MG/1
20 TABLET, FILM COATED ORAL DAILY
Qty: 90 TABLET | Refills: 4 | Status: SHIPPED | OUTPATIENT
Start: 2020-06-15 | End: 2021-07-02

## 2020-06-15 RX ORDER — METOPROLOL TARTRATE 25 MG/1
TABLET, FILM COATED ORAL
Qty: 180 TABLET | Refills: 4 | Status: SHIPPED | OUTPATIENT
Start: 2020-06-15 | End: 2021-07-02

## 2020-06-15 RX ORDER — LISINOPRIL 5 MG/1
5 TABLET ORAL DAILY
Qty: 90 TABLET | Refills: 4 | Status: SHIPPED | OUTPATIENT
Start: 2020-06-15 | End: 2021-07-02

## 2020-06-15 RX ORDER — CELECOXIB 200 MG/1
CAPSULE ORAL
Qty: 90 CAPSULE | Refills: 4 | Status: SHIPPED | OUTPATIENT
Start: 2020-06-15 | End: 2021-10-25

## 2020-06-15 NOTE — PROGRESS NOTES
"Dav Seay is a 70 year old male who is being evaluated via a billable telephone visit.      The patient has been notified of following:     \"This telephone visit will be conducted via a call between you and your physician/provider. We have found that certain health care needs can be provided without the need for a physical exam.  This service lets us provide the care you need with a short phone conversation.  If a prescription is necessary we can send it directly to your pharmacy.  If lab work is needed we can place an order for that and you can then stop by our lab to have the test done at a later time.    Telephone visits are billed at different rates depending on your insurance coverage. During this emergency period, for some insurers they may be billed the same as an in-person visit.  Please reach out to your insurance provider with any questions.    If during the course of the call the physician/provider feels a telephone visit is not appropriate, you will not be charged for this service.\"    Patient has given verbal consent for Telephone visit?  Yes    What phone number would you like to be contacted at? [phone]    How would you like to obtain your AVS? Eladio Lewis     Dav Seay is a 70 year old male who presents via phone visit today for the following health issues:    HPI       Hyperlipidemia Follow-Up      Are you regularly taking any medication or supplement to lower your cholesterol?   Yes- Lipitor    Are you having muscle aches or other side effects that you think could be caused by your cholesterol lowering medication?  No    Hypertension Follow-up      Do you check your blood pressure regularly outside of the clinic? Yes     Are you following a low salt diet? Yes    Are your blood pressures ever more than 140 on the top number (systolic) OR more   than 90 on the bottom number (diastolic), for example 140/90? No    Atrial Fibrillation Follow-up      How often do you take " nitroglycerin? Never    Do you take an aspirin every day? Yes    He is followed by Cardiology and Neurology at [facility] with recent evaluation and lab work.    Patient Active Problem List   Diagnosis     CARDIOVASCULAR SCREENING; LDL GOAL LESS THAN 160     Hypertension goal BP (blood pressure) < 140/90     Advance Care Planning     Insomnia, transient     Carpal tunnel syndrome of left wrist     Carpal tunnel syndrome of right wrist     CKD (chronic kidney disease) stage 3, GFR 30-59 ml/min (H)     Past Surgical History:   Procedure Laterality Date     COLONOSCOPY  6/10/2013    Procedure: COLONOSCOPY;  Colonoscopy;  Surgeon: Wes Pablo MD;  Location: [facility]     COLONOSCOPY N/A 5/20/2019    Procedure: COLONOSCOPY;  Surgeon: Jesus Harris MD;  Location:  GI     HC COLONOSCOPY THRU STOMA W BIOPSY/CAUTERY TUMOR/POLYP/LESION  2003    hyperplastic polyp repeat in 2008.     HC REMOVAL OF TONSILS,<13 Y/O  5 years old    Tonsils <14y.o.     HERNIA REPAIR, INGUINAL RT/LT       JOINT REPLACEMENT, HIP RT/LT  12/19/11    Left total hip arthroplasty. [facility]     JOINT REPLACEMENT, HIP RT/LT  12/18/2017    Right tota hip arthroplasty. [facility]     RELEASE CARPAL TUNNEL Left 10/18/2017    Procedure: RELEASE CARPAL TUNNEL;  Left Carpal Tunnel Release;  Surgeon: Norris Townsend MD;  Location:  OR       Social History     Tobacco Use     Smoking status: Never Smoker     Smokeless tobacco: Never Used   Substance Use Topics     Alcohol use: Yes     Comment: occasional     Family History   Problem Relation Age of Onset     Heart Disease Mother         Tachycardia     Osteoporosis Mother      Diabetes Father      Cancer Father         skin, lymph and colon CA     Arthritis Brother      Anesthesia Reaction No family hx of          Current Outpatient Medications   Medication Sig Dispense Refill     ASPIRIN PO Take 81 mg by mouth       atorvastatin (LIPITOR) 20 MG tablet Take 1 tablet (20  mg) by mouth daily Office visit due prior to further refills 90 tablet 4     celecoxib (CELEBREX) 200 MG capsule TAKE 1 CAPSULE BY MOUTH EVERY DAY 90 capsule 4     lisinopril (ZESTRIL) 5 MG tablet Take 1 tablet (5 mg) by mouth daily 90 tablet 4     MELATONIN PO        metoprolol tartrate (LOPRESSOR) 25 MG tablet TAKE 1 TABLET BY MOUTH TWICE A DAY.  Office visit due 180 tablet 4     Allergies   Allergen Reactions     No Known Drug Allergies      Recent Labs   Lab Test 03/03/20  0816 04/05/19  0936  03/16/17 03/21/16 02/25/15  0827   LDL  --  67  --   --  100.0 100   HDL  --  49  --   --  49 49   TRIG  --  71  --   --  75 94   ALT 26  --   --  41 31  --    CR 1.38* 1.23   < > 1.3 1.3 1.21   GFRESTIMATED 52* 60*   < > 56 56 61   GFRESTBLACK 60* 70   < >  --   --  73   POTASSIUM 4.4 4.7   < > 5.1* 4.9 4.5   TSH 1.85  --   --   --   --   --     < > = values in this interval not displayed.      BP Readings from Last 3 Encounters:   03/03/20 122/81   05/20/19 109/77   04/05/19 130/80    Wt Readings from Last 3 Encounters:   03/03/20 100.7 kg (222 lb)   04/05/19 99.6 kg (219 lb 8 oz)   02/08/18 98.4 kg (217 lb)                    Reviewed and updated as needed this visit by Provider         Review of Systems   CONSTITUTIONAL: NEGATIVE for fever, chills, change in weight  ENT/MOUTH: NEGATIVE for ear, mouth and throat problems  RESP: NEGATIVE for significant cough or SOB  CV: NEGATIVE for chest pain, palpitations or peripheral edema  ROS otherwise negative       Objective   Reported vitals:  There were no vitals taken for this visit.   healthy, alert and no distress  PSYCH: Alert and oriented times 3; coherent speech, normal   rate and volume, able to articulate logical thoughts, able   to abstract reason, no tangential thoughts, no hallucinations   or delusions  His affect is normal  RESP: No cough, no audible wheezing, able to talk in full sentences  Remainder of exam unable to be completed due to telephone  visits    Diagnostic Test Results:  Labs reviewed in Epic  Admission on 03/03/2020, Discharged on 03/03/2020   Component Date Value Ref Range Status     WBC 03/03/2020 7.9  4.0 - 11.0 10e9/L Final     RBC Count 03/03/2020 5.29  4.4 - 5.9 10e12/L Final     Hemoglobin 03/03/2020 16.8  13.3 - 17.7 g/dL Final     Hematocrit 03/03/2020 49.7  40.0 - 53.0 % Final     MCV 03/03/2020 94  78 - 100 fl Final     MCH 03/03/2020 31.8  26.5 - 33.0 pg Final     MCHC 03/03/2020 33.8  31.5 - 36.5 g/dL Final     RDW 03/03/2020 13.0  10.0 - 15.0 % Final     Platelet Count 03/03/2020 251  150 - 450 10e9/L Final     Diff Method 03/03/2020 Automated Method   Final     % Neutrophils 03/03/2020 72.4  % Final     % Lymphocytes 03/03/2020 15.1  % Final     % Monocytes 03/03/2020 8.9  % Final     % Eosinophils 03/03/2020 2.4  % Final     % Basophils 03/03/2020 0.8  % Final     % Immature Granulocytes 03/03/2020 0.4  % Final     Nucleated RBCs 03/03/2020 0  0 /100 Final     Absolute Neutrophil 03/03/2020 5.7  1.6 - 8.3 10e9/L Final     Absolute Lymphocytes 03/03/2020 1.2  0.8 - 5.3 10e9/L Final     Absolute Monocytes 03/03/2020 0.7  0.0 - 1.3 10e9/L Final     Absolute Basophils 03/03/2020 0.1  0.0 - 0.2 10e9/L Final     Abs Immature Granulocytes 03/03/2020 0.0  0 - 0.4 10e9/L Final     Absolute Nucleated RBC 03/03/2020 0.0   Final     INR 03/03/2020 0.97  0.86 - 1.14 Final     Sodium 03/03/2020 139  133 - 144 mmol/L Final     Potassium 03/03/2020 4.4  3.4 - 5.3 mmol/L Final     Chloride 03/03/2020 106  94 - 109 mmol/L Final     Carbon Dioxide 03/03/2020 25  20 - 32 mmol/L Final     Anion Gap 03/03/2020 8  3 - 14 mmol/L Final     Glucose 03/03/2020 114* 70 - 99 mg/dL Final     Urea Nitrogen 03/03/2020 27  7 - 30 mg/dL Final     Creatinine 03/03/2020 1.38* 0.66 - 1.25 mg/dL Final     GFR Estimate 03/03/2020 52* >60 mL/min/[1.73_m2] Final    Comment: Non  GFR Calc  Starting 12/18/2018, serum creatinine based estimated GFR (eGFR)  will be   calculated using the Chronic Kidney Disease Epidemiology Collaboration   (CKD-EPI) equation.       GFR Estimate If Black 03/03/2020 60* >60 mL/min/[1.73_m2] Final    Comment:  GFR Calc  Starting 12/18/2018, serum creatinine based estimated GFR (eGFR) will be   calculated using the Chronic Kidney Disease Epidemiology Collaboration   (CKD-EPI) equation.       Calcium 03/03/2020 9.2  8.5 - 10.1 mg/dL Final     Bilirubin Total 03/03/2020 1.0  0.2 - 1.3 mg/dL Final     Albumin 03/03/2020 4.2  3.4 - 5.0 g/dL Final     Protein Total 03/03/2020 7.2  6.8 - 8.8 g/dL Final     Alkaline Phosphatase 03/03/2020 65  40 - 150 U/L Final     ALT 03/03/2020 26  0 - 70 U/L Final     AST 03/03/2020 20  0 - 45 U/L Final     Troponin I ES 03/03/2020 <0.015  0.000 - 0.045 ug/L Final    Comment: The 99th percentile for upper reference range is 0.045 ug/L.  Troponin values   in the range of 0.045 - 0.120 ug/L may be associated with risks of adverse   clinical events.       TSH 03/03/2020 1.85  0.40 - 4.00 mU/L Final     Troponin I ES 03/03/2020 <0.015  0.000 - 0.045 ug/L Final    Comment: The 99th percentile for upper reference range is 0.045 ug/L.  Troponin values   in the range of 0.045 - 0.120 ug/L may be associated with risks of adverse   clinical events.               Assessment/Plan:  1. CKD (chronic kidney disease) stage 3, GFR 30-59 ml/min (H)  Chronic stable. Continue BP control, statin and ASA.  Avoid OTC NSAID. Maintain good hydration.  - atorvastatin (LIPITOR) 20 MG tablet; Take 1 tablet (20 mg) by mouth daily Office visit due prior to further refills  Dispense: 90 tablet; Refill: 4  - lisinopril (ZESTRIL) 5 MG tablet; Take 1 tablet (5 mg) by mouth daily  Dispense: 90 tablet; Refill: 4    2. Hyperlipidemia LDL goal <100  Chronic controlled with recent fasting lipids at [facility] 2/18/20.  - atorvastatin (LIPITOR) 20 MG tablet; Take 1 tablet (20 mg) by mouth daily Office visit due prior to further  refills  Dispense: 90 tablet; Refill: 4    3. Arthralgia, unspecified joint  Chronic intermittent. Continues to tolerate celebrex. Renal function stable. The current medical regimen is effective;  continue present plan and medications.   - celecoxib (CELEBREX) 200 MG capsule; TAKE 1 CAPSULE BY MOUTH EVERY DAY  Dispense: 90 capsule; Refill: 4    4. Hypertension goal BP (blood pressure) < 140/90  Chronic, controlled. The current medical regimen is effective;  continue present plan and medications.   - lisinopril (ZESTRIL) 5 MG tablet; Take 1 tablet (5 mg) by mouth daily  Dispense: 90 tablet; Refill: 4  - metoprolol tartrate (LOPRESSOR) 25 MG tablet; TAKE 1 TABLET BY MOUTH TWICE A DAY.  Office visit due  Dispense: 180 tablet; Refill: 4    Return in about 1 year (around 6/15/2021) for Annual Preventive Exam, Lab Work.      Phone call duration:  5 minutes    Ryan Perera MD

## 2020-06-15 NOTE — Clinical Note
Please abstract lab and AWV from [facility] Everywhere  Electronically signed by Ryan Perera MD

## 2020-06-24 RX ORDER — METOPROLOL TARTRATE 25 MG/1
TABLET, FILM COATED ORAL
Qty: 60 TABLET | Refills: 0 | OUTPATIENT
Start: 2020-06-24

## 2020-06-24 RX ORDER — CELECOXIB 200 MG/1
CAPSULE ORAL
Qty: 30 CAPSULE | Refills: 0 | OUTPATIENT
Start: 2020-06-24

## 2020-06-24 RX ORDER — ATORVASTATIN CALCIUM 20 MG/1
TABLET, FILM COATED ORAL
Qty: 30 TABLET | Refills: 0 | OUTPATIENT
Start: 2020-06-24

## 2020-06-24 NOTE — TELEPHONE ENCOUNTER
Medications were filled on 6/15/20 during Virtual visit. Lesvia Wen LPN........6/24/2020 12:19 PM

## 2020-08-03 ENCOUNTER — MYC MEDICAL ADVICE (OUTPATIENT)
Dept: FAMILY MEDICINE | Facility: CLINIC | Age: 70
End: 2020-08-03

## 2020-08-03 DIAGNOSIS — U07.1 COVID-19: Primary | ICD-10-CM

## 2020-08-11 DIAGNOSIS — U07.1 COVID-19: ICD-10-CM

## 2020-08-11 PROCEDURE — U0003 INFECTIOUS AGENT DETECTION BY NUCLEIC ACID (DNA OR RNA); SEVERE ACUTE RESPIRATORY SYNDROME CORONAVIRUS 2 (SARS-COV-2) (CORONAVIRUS DISEASE [COVID-19]), AMPLIFIED PROBE TECHNIQUE, MAKING USE OF HIGH THROUGHPUT TECHNOLOGIES AS DESCRIBED BY CMS-2020-01-R: HCPCS | Performed by: FAMILY MEDICINE

## 2020-08-12 LAB
SARS-COV-2 RNA SPEC QL NAA+PROBE: NOT DETECTED
SPECIMEN SOURCE: NORMAL

## 2020-09-21 ENCOUNTER — APPOINTMENT (OUTPATIENT)
Dept: URBAN - METROPOLITAN AREA CLINIC 259 | Age: 70
Setting detail: DERMATOLOGY
End: 2020-09-21

## 2020-09-21 DIAGNOSIS — L57.8 OTHER SKIN CHANGES DUE TO CHRONIC EXPOSURE TO NONIONIZING RADIATION: ICD-10-CM

## 2020-09-21 DIAGNOSIS — L57.0 ACTINIC KERATOSIS: ICD-10-CM

## 2020-09-21 DIAGNOSIS — D22 MELANOCYTIC NEVI: ICD-10-CM

## 2020-09-21 PROBLEM — D48.5 NEOPLASM OF UNCERTAIN BEHAVIOR OF SKIN: Status: ACTIVE | Noted: 2020-09-21

## 2020-09-21 PROBLEM — D22.5 MELANOCYTIC NEVI OF TRUNK: Status: ACTIVE | Noted: 2020-09-21

## 2020-09-21 PROCEDURE — 99214 OFFICE O/P EST MOD 30 MIN: CPT | Mod: 25

## 2020-09-21 PROCEDURE — OTHER COUNSELING: OTHER

## 2020-09-21 PROCEDURE — 17000 DESTRUCT PREMALG LESION: CPT | Mod: 59

## 2020-09-21 PROCEDURE — 11300 SHAVE SKIN LESION 0.5 CM/<: CPT

## 2020-09-21 PROCEDURE — OTHER SHAVE REMOVAL: OTHER

## 2020-09-21 PROCEDURE — OTHER LIQUID NITROGEN: OTHER

## 2020-09-21 ASSESSMENT — LOCATION DETAILED DESCRIPTION DERM
LOCATION DETAILED: LEFT INFERIOR CENTRAL MALAR CHEEK
LOCATION DETAILED: SUPERIOR THORACIC SPINE
LOCATION DETAILED: INFERIOR THORACIC SPINE
LOCATION DETAILED: LEFT ANTERIOR DISTAL THIGH

## 2020-09-21 ASSESSMENT — LOCATION ZONE DERM
LOCATION ZONE: LEG
LOCATION ZONE: FACE
LOCATION ZONE: TRUNK

## 2020-09-21 ASSESSMENT — LOCATION SIMPLE DESCRIPTION DERM
LOCATION SIMPLE: UPPER BACK
LOCATION SIMPLE: LEFT CHEEK
LOCATION SIMPLE: LEFT THIGH

## 2020-09-21 NOTE — PROCEDURE: SHAVE REMOVAL
Notification Instructions: Patient will be notified of pathology results. However, patient instructed to call the office if not contacted within 2 weeks.
Detail Level: Detailed
Size Of Lesion In Cm (Required): 0.3
Bill 31401 For Specimen Handling/Conveyance To Laboratory?: no
Medical Necessity Clause: This procedure was medically necessary because the lesion that was treated was:
Anesthesia Volume In Cc: 0.5
Body Location Override (Optional - Billing Will Still Be Based On Selected Body Map Location If Applicable): left mid anterior thigh
Wound Care: Petrolatum
Post-Care Instructions: I reviewed with the patient in detail post-care instructions. Patient is to keep the biopsy site covered with Vaseline and bandage for 1 week
Biopsy Method: Dermablade
Render Post-Care Instructions In Note?: yes
Medical Necessity Information: It is in your best interest to select a reason for this procedure from the list below. All of these items fulfill various CMS LCD requirements except the new and changing color options.
Path Notes (To The Dermatopathologist): Please check margins.
Hemostasis: Drysol
Consent was obtained from the patient. The risks and benefits to therapy were discussed in detail. Specifically, the risks of infection, scarring, bleeding, prolonged wound healing, incomplete removal, allergy to anesthesia, nerve injury and recurrence were addressed. Prior to the procedure, the treatment site was clearly identified and confirmed by the patient. All components of Universal Protocol/PAUSE Rule completed.
Billing Type: Third-Party Bill
Anesthesia Type: 1% lidocaine with epinephrine
X Size Of Lesion In Cm (Optional): 0

## 2020-09-21 NOTE — PROCEDURE: LIQUID NITROGEN
Render Post-Care Instructions In Note?: yes
Consent: The patient's consent was obtained including but not limited to risks of crusting, scabbing, blistering, scarring, darker or lighter pigmentary change, recurrence, incomplete removal and infection.
Detail Level: Detailed
Duration Of Freeze Thaw-Cycle (Seconds): 5
Number Of Freeze-Thaw Cycles: 1 freeze-thaw cycle
Post-Care Instructions: I reviewed with the patient in detail post-care instructions. Patient is to wear sunprotection, and avoid picking at any of the treated lesions. Pt may apply Vaseline to crusted or scabbing areas.

## 2020-10-15 ENCOUNTER — TRANSFERRED RECORDS (OUTPATIENT)
Dept: HEALTH INFORMATION MANAGEMENT | Facility: CLINIC | Age: 70
End: 2020-10-15

## 2020-11-20 NOTE — NURSING NOTE
Chief Complaint   Patient presents with     RECHECK       Initial /72 (BP Location: Right arm, Patient Position: Chair, Cuff Size: Adult Large)  Pulse 58  Temp 97.5  F (36.4  C) (Temporal)  Resp 16  Wt 217 lb (98.4 kg)  SpO2 99%  BMI 29.43 kg/m2 Estimated body mass index is 29.43 kg/(m^2) as calculated from the following:    Height as of 10/30/17: 6' (1.829 m).    Weight as of this encounter: 217 lb (98.4 kg).  Medication Reconciliation: complete     Zoe Mo MA 2/8/2018  8:07 AM           Simponi Counseling:  I discussed with the patient the risks of golimumab including but not limited to myelosuppression, immunosuppression, autoimmune hepatitis, demyelinating diseases, lymphoma, and serious infections.  The patient understands that monitoring is required including a PPD at baseline and must alert us or the primary physician if symptoms of infection or other concerning signs are noted.

## 2021-01-10 ENCOUNTER — HEALTH MAINTENANCE LETTER (OUTPATIENT)
Age: 71
End: 2021-01-10

## 2021-03-21 ENCOUNTER — HOSPITAL ENCOUNTER (EMERGENCY)
Facility: CLINIC | Age: 71
Discharge: HOME | End: 2021-03-21
Attending: EMERGENCY MEDICINE | Admitting: EMERGENCY MEDICINE
Payer: MEDICARE

## 2021-03-21 ENCOUNTER — APPOINTMENT (OUTPATIENT)
Dept: GENERAL RADIOLOGY | Facility: CLINIC | Age: 71
End: 2021-03-21
Attending: EMERGENCY MEDICINE
Payer: MEDICARE

## 2021-03-21 VITALS
SYSTOLIC BLOOD PRESSURE: 128 MMHG | TEMPERATURE: 98.2 F | BODY MASS INDEX: 29.71 KG/M2 | HEART RATE: 58 BPM | OXYGEN SATURATION: 97 % | RESPIRATION RATE: 16 BRPM | DIASTOLIC BLOOD PRESSURE: 78 MMHG | WEIGHT: 210 LBS

## 2021-03-21 DIAGNOSIS — S49.92XA SHOULDER INJURY, LEFT, INITIAL ENCOUNTER: ICD-10-CM
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
## 2021-03-21 DIAGNOSIS — M25.512 ACUTE PAIN OF LEFT SHOULDER: ICD-10-CM

## 2021-03-21 PROCEDURE — 99283 EMERGENCY DEPT VISIT LOW MDM: CPT | Performed by: EMERGENCY MEDICINE

## 2021-03-21 PROCEDURE — 99282 EMERGENCY DEPT VISIT SF MDM: CPT | Performed by: EMERGENCY MEDICINE

## 2021-03-21 PROCEDURE — 73030 X-RAY EXAM OF SHOULDER: CPT | Mod: LT

## 2021-03-21 NOTE — ED PROVIDER NOTES
History     Chief Complaint   Patient presents with     Shoulder Injury     HPI  History per patient and medical records    This is a 71-year-old male, history as below, presenting with shoulder injury.  Patient was walking down a small slope yesterday when he accidentally slipped on some ice under some leaves and fell onto his left elbow.  He manpreet the left shoulder with the fall.  Since then, he is noted pain primarily in the left shoulder with any activity.  He is left-handed.  He notes pain with nearly all motion of the left shoulder.  He has not had any swelling.  He has not taken any medications for the pain.  No history of injury to the left shoulder in the past.  No numbness or tingling.  No other injuries or complaints including no neck or back pain, chest pain.    Allergies:  Allergies   Allergen Reactions     No Known Drug Allergies        Problem List:    Patient Active Problem List    Diagnosis Date Noted     CKD (chronic kidney disease) stage 3, GFR 30-59 ml/min 01/08/2018     Priority: Medium     Carpal tunnel syndrome of left wrist 07/25/2017     Priority: Medium     Carpal tunnel syndrome of right wrist 07/25/2017     Priority: Medium     Insomnia, transient 12/02/2013     Priority: Medium     Advance Care Planning 11/30/2012     Priority: Medium     Advance Care Planning 9/16/2015: Receipt of ACP document:  Received: Health Care Directive which was witnessed or notarized on 8/29/15.  Document not previously scanned.  Validation form completed and sent with document to be scanned.  Code Status needs to be updated to reflect choices in most recent ACP document. Confirmed/documented designated decision maker(s).  Added by Tara Davis RN, BSN, MA, Advance Care Planning Liaison.  Advance Care Planning 11/30/12: Patient states has Advance Directive and will bring in a copy to clinic. 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     HC REMOVAL OF TONSILS,<13 Y/O  5 years old    Tonsils <14y.o.     HERNIA REPAIR, INGUINAL RT/LT       JOINT REPLACEMENT, HIP RT/LT  12/19/11    Left total hip arthroplasty. [facility]     JOINT REPLACEMENT, HIP RT/LT  12/18/2017    Right tota hip arthroplasty. [facility]     RELEASE CARPAL TUNNEL Left 10/18/2017    Procedure: RELEASE CARPAL TUNNEL;  Left Carpal Tunnel Release;  Surgeon: Norris Townsend MD;  Location:  OR       Family History:    Family History   Problem Relation Age of Onset     Heart Disease Mother         Tachycardia     Osteoporosis Mother      Diabetes Father      Cancer Father         skin, lymph and colon CA     Arthritis Brother      Anesthesia Reaction No family hx of        Social History:  Marital Status:   [2]  Social History     Tobacco Use     Smoking status: Never Smoker     Smokeless tobacco: Never Used   Substance Use Topics     Alcohol use: Yes     Comment: occasional     Drug use: No        Medications:    ASPIRIN PO  atorvastatin (LIPITOR) 20 MG tablet  celecoxib (CELEBREX) 200 MG capsule  lisinopril (ZESTRIL) 5 MG tablet  MELATONIN PO  metoprolol tartrate (LOPRESSOR) 25 MG tablet          Review of Systems    All other ROS reviewed and are negative or  non-contributory except as stated in HPI.       Physical Exam   BP: 131/80  Pulse: 55  Temp: 98.2  F (36.8  C)  Resp: 16  Weight: 95.3 kg (210 lb)  SpO2: 97 %      Physical Exam  Vitals signs and nursing note reviewed.   Constitutional:       Appearance: Normal appearance.      Comments: Healthy-appearing male sitting in the bed   HENT:      Head: Normocephalic.   Eyes:      Extraocular Movements: Extraocular movements intact.      Conjunctiva/sclera: Conjunctivae normal.   Neck:      Musculoskeletal: Normal range of motion and neck supple. No muscular tenderness.   Cardiovascular:      Rate and Rhythm: Normal rate and regular rhythm.      Pulses: Normal pulses.   Pulmonary:      Effort: Pulmonary effort is normal.   Chest:      Chest wall: No tenderness.   Musculoskeletal:      Comments: CMS intact at the left elbow, wrist, fingers.  No tenderness over the left clavicle or scapula.  Some anterior tenderness at the shoulder.  Pain with nearly all range of motion including external rotation, abduction of the left shoulder.   Skin:     General: Skin is warm and dry.      Findings: No bruising.   Neurological:      General: No focal deficit present.      Mental Status: He is alert and oriented to person, place, and time.   Psychiatric:         Mood and Affect: Mood normal.         Behavior: Behavior normal.         ED Course (with Medical Decision Making)    Pt seen and examined by me.  RN and EPIC notes reviewed.       Patient with injury to left shoulder.  He fell on the elbow and basically wrenched this shoulder so my suspicion is that this is either a cartilage issue or something within the rotator cuff bundle.  I did get an x-ray which was unremarkable.    We talked about shoulder anatomy.  We talked about passive range of motion including pendulum swing of the shoulder.  I placed him in a sling for comfort.  He can use rest, ice and heat.  He is already on Celebrex so he can add Tylenol as needed.  I am setting  him up for nonsurgical orthopedic follow-up.  If he has any significant worsening, changes or concerns he can return at any time.        Procedures    Results for orders placed or performed during the hospital encounter of 03/21/21 (from the past 24 hour(s))   XR Shoulder Left G/E 3 Views    Narrative    XR SHOULDER LT G/E 3 VW 3/21/2021 9:28 AM     HISTORY: fall; pain    COMPARISON: None.      Impression    IMPRESSION: Minimal hypertrophic changes in the glenohumeral and  acromioclavicular joints. Normal glenohumeral alignment. No fractures  are evident.     SHAGGY LARA MD       Medications - No data to display    Assessments & Plan     I have reviewed the findings, diagnosis, plan and need for follow up with the patient.    Discharge Medication List as of 3/21/2021 10:10 AM          Final diagnoses:   Shoulder injury, left, initial encounter   Acute pain of left shoulder   Fall due to slipping on ice or snow, initial encounter     Disposition: Patient discharged home in stable condition.  Plan as above.  Return for concerns.     Note: Chart documentation done in part with Dragon Voice Recognition software. Although reviewed after completion, some word and grammatical errors may remain.     3/21/2021   [facility] EMERGENCY DEPT     Su Rojas MD  03/21/21 [id number]

## 2021-03-21 NOTE — DISCHARGE INSTRUCTIONS
Continue your Celebrex for pain and inflammation.  Okay to add Tylenol if needed for pain.    Use the sling as needed for comfort.  You can also try ice or heat over the area.    We will set you up for follow-up with one of the nonsurgical orthopedic specialists.      To prevent any frozen shoulder, I recommend trying to do some passive exercises.    I hope you heal quickly!!

## 2021-03-25 NOTE — PROGRESS NOTES
"Sports Medicine Clinic Visit    PCP: Ryan Perera    CC: Patient presents with:  Left Shoulder - Pain      HPI:  Dav Seay is a 71 year old male who is seen as an ER referral.   He notes a left shoulder injury that happened on 3/20/21 when he slipped on ice and fell onto his elbow and back.  He notes pain over the superior shoulder region with no radiating pain into neck or arm. Dav notes that his pain is much improved since his injury last Saturday.  He rates the pain at a  5/10 at its worst and a 2/10 currently.  Symptoms are relieved with ice and Tylenol. Symptoms are worsened by pushing himself out of a chair with weight on his elbow and lifting arm above head. He endorses just pain.   He denies bruising, popping, grinding, catching, instability, numbness and tingling.  Other treatment has included cold compresses and Tylenol. He notes difficulty with axial loading of shoulder onto elbow and lifting arm above head.  He feels like he is a \"thousand percent better\" than when he injured it.      He works part time as a .    History reviewed. No pertinent past surgical/medical/family/social history other than as mentioned in HPI.  Review of systems negative except per HPI.      Patient Active Problem List   Diagnosis     CARDIOVASCULAR SCREENING; LDL GOAL LESS THAN 160     Hypertension goal BP (blood pressure) < 140/90     Advance Care Planning     Insomnia, transient     Carpal tunnel syndrome of left wrist     Carpal tunnel syndrome of right wrist     CKD (chronic kidney disease) stage 3, GFR 30-59 ml/min     Past Medical History:   Diagnosis Date     Hypertension      Traumatic amputation of other finger(s) (complete) (partial), without mention of complication 1975    Amputated RIght 2nd finger     Past Surgical History:   Procedure Laterality Date     COLONOSCOPY  6/10/2013    Procedure: COLONOSCOPY;  Colonoscopy;  Surgeon: Wes Pablo MD;  Location:  GI     COLONOSCOPY " N/A 5/20/2019    Procedure: COLONOSCOPY;  Surgeon: Jesus Harris MD;  Location: [facility]     HC COLONOSCOPY THRU STOMA W BIOPSY/CAUTERY TUMOR/POLYP/LESION  2003    hyperplastic polyp repeat in 2008.     HC REMOVAL OF TONSILS,<11 Y/O  5 years old    Tonsils <14y.o.     HERNIA REPAIR, INGUINAL RT/LT       JOINT REPLACEMENT, HIP RT/LT  12/19/11    Left total hip arthroplasty. [facility]     JOINT REPLACEMENT, HIP RT/LT  12/18/2017    Right tota hip arthroplasty. [facility]     RELEASE CARPAL TUNNEL Left 10/18/2017    Procedure: RELEASE CARPAL TUNNEL;  Left Carpal Tunnel Release;  Surgeon: Norris Townsend MD;  Location: [facility]     Family History   Problem Relation Age of Onset     Heart Disease Mother         Tachycardia     Osteoporosis Mother      Diabetes Father      Cancer Father         skin, lymph and colon CA     Arthritis Brother      Anesthesia Reaction No family hx of      Social History     Socioeconomic History     Marital status:      Spouse name: Lily     Number of children: 3     Years of education: Not on file     Highest education level: Not on file   Occupational History     Occupation: contractor   Social Needs     Financial resource strain: Not on file     Food insecurity     Worry: Not on file     Inability: Not on file     Transportation needs     Medical: Not on file     Non-medical: Not on file   Tobacco Use     Smoking status: Never Smoker     Smokeless tobacco: Never Used   Substance and Sexual Activity     Alcohol use: Yes     Comment: occasional     Drug use: No     Sexual activity: Yes     Partners: Female   Lifestyle     Physical activity     Days per week: Not on file     Minutes per session: Not on file     Stress: Not on file   Relationships     Social connections     Talks on phone: Not on file     Gets together: Not on file     Attends Faith service: Not on file     Active member of club or organization: Not on file     Attends meetings of clubs or  organizations: Not on file     Relationship status: Not on file     Intimate partner violence     Fear of current or ex partner: Not on file     Emotionally abused: Not on file     Physically abused: Not on file     Forced sexual activity: Not on file   Other Topics Concern      Service Not Asked     Blood Transfusions Not Asked     Caffeine Concern Not Asked     Occupational Exposure Not Asked     Hobby Hazards Not Asked     Sleep Concern Not Asked     Stress Concern Not Asked     Weight Concern Not Asked     Special Diet Not Asked     Back Care Not Asked     Exercise Not Asked     Bike Helmet Not Asked     Seat Belt Not Asked     Self-Exams Not Asked     Parent/sibling w/ CABG, MI or angioplasty before 65F 55M? Not Asked   Social History Narrative     Not on file         Current Outpatient Medications   Medication     ASPIRIN PO     atorvastatin (LIPITOR) 20 MG tablet     lisinopril (ZESTRIL) 5 MG tablet     MELATONIN PO     metoprolol tartrate (LOPRESSOR) 25 MG tablet     celecoxib (CELEBREX) 200 MG capsule     No current facility-administered medications for this visit.      Allergies   Allergen Reactions     No Known Drug Allergies          Objective:  /70 (BP Location: Right arm, Patient Position: Sitting, Cuff Size: Adult Regular)   Wt 100.2 kg (221 lb)   BMI 31.26 kg/m      General: Alert and in no distress    Head: Normocephalic, atraumatic  Eyes: no scleral icterus or conjunctival erythema   Skin: no erythema, petechiae, or jaundice  CV: regular rhythm by palpation, 2+ distal pulses  Resp: normal respiratory effort without conversational dyspnea   Psych: normal mood and affect    Gait: Non-antalgic, appropriate coordination and balance   Neuro: Motor strength and sensation as noted below    Musculoskeletal:    Bilateral Shoulder exam    Inspection and Posture:       normal    Skin:        no visible deformities    Palpation:  -Tender over the left AC joint    ROM:        Full shoulder ROM  bilaterally.  Left shoulder abduction and external rotation are painful.      Strength:        shoulder shrug 5/5 bilaterally       shoulder abduction 5/5 bilaterally       shoulder flexion 5/5 bilaterally       shoulder internal rotation 5/5 bilaterally       shoulder external rotation 5/5 bilaterally       elbow flexion 5/5 bilaterally       elbow extension 5/5 bilaterally    Sensation:        normal sensation over shoulder and upper extremity       Radiology:  Independent visualization of images performed.      XR SHOULDER LT G/E 3 VW 3/21/2021 9:28 AM      HISTORY: fall; pain     COMPARISON: None.                                                                      IMPRESSION: Minimal hypertrophic changes in the glenohumeral and  acromioclavicular joints. Normal glenohumeral alignment. No fractures  are evident.      SHAGGY LARA MD    Assessment:  1. Injury of left shoulder, initial encounter        Plan:  Discussed the assessment with the patient and developed a plan together:  -Dav is doing significantly better since his injury on 3/20/2021.  Rotator cuff strain versus AC joint sprain.    -Patient's preferred over the counter medication as directed on packaging as needed for pain or soreness.  -Ice or heat 15-20 minutes as needed (Avoid sleeping on a heating pad or ice).  -Work on gentle range of motion of the shoulder.    -Follow up as needed if symptoms fail to improve or worsen.  Please call with questions or concerns.        Zoe Castro MD, CAQ Sports Medicine  [facility]

## 2021-03-26 ENCOUNTER — OFFICE VISIT (OUTPATIENT)
Dept: ORTHOPEDICS | Facility: CLINIC | Age: 71
End: 2021-03-26
Payer: COMMERCIAL

## 2021-03-26 VITALS — WEIGHT: 221 LBS | DIASTOLIC BLOOD PRESSURE: 70 MMHG | BODY MASS INDEX: 31.26 KG/M2 | SYSTOLIC BLOOD PRESSURE: 122 MMHG

## 2021-03-26 DIAGNOSIS — S49.92XA INJURY OF LEFT SHOULDER, INITIAL ENCOUNTER: Primary | ICD-10-CM

## 2021-03-26 PROCEDURE — 99203 OFFICE O/P NEW LOW 30 MIN: CPT | Performed by: PHYSICAL MEDICINE & REHABILITATION

## 2021-03-26 NOTE — LETTER
"    3/26/2021         RE: Dav Seay  [address] [facility] [id number]        Dear Colleague,    Thank you for referring your patient, Dav Seay, to the [facility] [place]. Please see a copy of my visit note below.    Sports Medicine Clinic Visit    PCP: Ryan Perera    CC: Patient presents with:  Left Shoulder - Pain      HPI:  Dav Seay is a 71 year old male who is seen as an ER referral.   He notes a left shoulder injury that happened on 3/20/21 when he slipped on ice and fell onto his elbow and back.  He notes pain over the superior shoulder region with no radiating pain into neck or arm. Dav notes that his pain is much improved since his injury last Saturday.  He rates the pain at a  5/10 at its worst and a 2/10 currently.  Symptoms are relieved with ice and Tylenol. Symptoms are worsened by pushing himself out of a chair with weight on his elbow and lifting arm above head. He endorses just pain.   He denies bruising, popping, grinding, catching, instability, numbness and tingling.  Other treatment has included cold compresses and Tylenol. He notes difficulty with axial loading of shoulder onto elbow and lifting arm above head.  He feels like he is a \"thousand percent better\" than when he injured it.      He works part time as a .    History reviewed. No pertinent past surgical/medical/family/social history other than as mentioned in HPI.  Review of systems negative except per HPI.      Patient Active Problem List   Diagnosis     CARDIOVASCULAR SCREENING; LDL GOAL LESS THAN 160     Hypertension goal BP (blood pressure) < 140/90     Advance Care Planning     Insomnia, transient     Carpal tunnel syndrome of left wrist     Carpal tunnel syndrome of right wrist     CKD (chronic kidney disease) stage 3, GFR 30-59 ml/min     Past Medical History:   Diagnosis Date     Hypertension      Traumatic amputation of other finger(s) (complete) " (partial), without mention of complication 1975    Amputated RIght 2nd finger     Past Surgical History:   Procedure Laterality Date     COLONOSCOPY  6/10/2013    Procedure: COLONOSCOPY;  Colonoscopy;  Surgeon: Wes Pablo MD;  Location: [facility]     COLONOSCOPY N/A 5/20/2019    Procedure: COLONOSCOPY;  Surgeon: Jesus Harris MD;  Location:  GI     HC COLONOSCOPY THRU STOMA W BIOPSY/CAUTERY TUMOR/POLYP/LESION  2003    hyperplastic polyp repeat in 2008.     HC REMOVAL OF TONSILS,<13 Y/O  5 years old    Tonsils <14y.o.     HERNIA REPAIR, INGUINAL RT/LT       JOINT REPLACEMENT, HIP RT/LT  12/19/11    Left total hip arthroplasty. [facility]     JOINT REPLACEMENT, HIP RT/LT  12/18/2017    Right tota hip arthroplasty. [facility]     RELEASE CARPAL TUNNEL Left 10/18/2017    Procedure: RELEASE CARPAL TUNNEL;  Left Carpal Tunnel Release;  Surgeon: Norris Townsend MD;  Location[facility]     Family History   Problem Relation Age of Onset     Heart Disease Mother         Tachycardia     Osteoporosis Mother      Diabetes Father      Cancer Father         skin, lymph and colon CA     Arthritis Brother      Anesthesia Reaction No family hx of      Social History     Socioeconomic History     Marital status:      Spouse name: Lily     Number of children: 3     Years of education: Not on file     Highest education level: Not on file   Occupational History     Occupation: contractor   Social Needs     Financial resource strain: Not on file     Food insecurity     Worry: Not on file     Inability: Not on file     Transportation needs     Medical: Not on file     Non-medical: Not on file   Tobacco Use     Smoking status: Never Smoker     Smokeless tobacco: Never Used   Substance and Sexual Activity     Alcohol use: Yes     Comment: occasional     Drug use: No     Sexual activity: Yes     Partners: Female   Lifestyle     Physical activity     Days per week: Not on file     Minutes per  session: Not on file     Stress: Not on file   Relationships     Social connections     Talks on phone: Not on file     Gets together: Not on file     Attends Synagogue service: Not on file     Active member of club or organization: Not on file     Attends meetings of clubs or organizations: Not on file     Relationship status: Not on file     Intimate partner violence     Fear of current or ex partner: Not on file     Emotionally abused: Not on file     Physically abused: Not on file     Forced sexual activity: Not on file   Other Topics Concern      Service Not Asked     Blood Transfusions Not Asked     Caffeine Concern Not Asked     Occupational Exposure Not Asked     Hobby Hazards Not Asked     Sleep Concern Not Asked     Stress Concern Not Asked     Weight Concern Not Asked     Special Diet Not Asked     Back Care Not Asked     Exercise Not Asked     Bike Helmet Not Asked     Seat Belt Not Asked     Self-Exams Not Asked     Parent/sibling w/ CABG, MI or angioplasty before 65F 55M? Not Asked   Social History Narrative     Not on file         Current Outpatient Medications   Medication     ASPIRIN PO     atorvastatin (LIPITOR) 20 MG tablet     lisinopril (ZESTRIL) 5 MG tablet     MELATONIN PO     metoprolol tartrate (LOPRESSOR) 25 MG tablet     celecoxib (CELEBREX) 200 MG capsule     No current facility-administered medications for this visit.      Allergies   Allergen Reactions     No Known Drug Allergies          Objective:  /70 (BP Location: Right arm, Patient Position: Sitting, Cuff Size: Adult Regular)   Wt 100.2 kg (221 lb)   BMI 31.26 kg/m      General: Alert and in no distress    Head: Normocephalic, atraumatic  Eyes: no scleral icterus or conjunctival erythema   Skin: no erythema, petechiae, or jaundice  CV: regular rhythm by palpation, 2+ distal pulses  Resp: normal respiratory effort without conversational dyspnea   Psych: normal mood and affect    Gait: Non-antalgic, appropriate  coordination and balance   Neuro: Motor strength and sensation as noted below    Musculoskeletal:    Bilateral Shoulder exam    Inspection and Posture:       normal    Skin:        no visible deformities    Palpation:  -Tender over the left AC joint    ROM:        Full shoulder ROM bilaterally.  Left shoulder abduction and external rotation are painful.      Strength:        shoulder shrug 5/5 bilaterally       shoulder abduction 5/5 bilaterally       shoulder flexion 5/5 bilaterally       shoulder internal rotation 5/5 bilaterally       shoulder external rotation 5/5 bilaterally       elbow flexion 5/5 bilaterally       elbow extension 5/5 bilaterally    Sensation:        normal sensation over shoulder and upper extremity       Radiology:  Independent visualization of images performed.      XR SHOULDER LT G/E 3 VW 3/21/2021 9:28 AM      HISTORY: fall; pain     COMPARISON: None.                                                                      IMPRESSION: Minimal hypertrophic changes in the glenohumeral and  acromioclavicular joints. Normal glenohumeral alignment. No fractures  are evident.      SHAGGY LARA MD    Assessment:  1. Injury of left shoulder, initial encounter        Plan:  Discussed the assessment with the patient and developed a plan together:  -Dav is doing significantly better since his injury on 3/20/2021.  Rotator cuff strain versus AC joint sprain.    -Patient's preferred over the counter medication as directed on packaging as needed for pain or soreness.  -Ice or heat 15-20 minutes as needed (Avoid sleeping on a heating pad or ice).  -Work on gentle range of motion of the shoulder.    -Follow up as needed if symptoms fail to improve or worsen.  Please call with questions or concerns.        Zoe Castro MD, [facility] Sports Medicine  [facility]        Again, thank you for allowing me to participate in the care of your patient.        Sincerely,        Jerrica ANGEL  MD Gloria

## 2021-07-01 DIAGNOSIS — N18.30 CKD (CHRONIC KIDNEY DISEASE) STAGE 3, GFR 30-59 ML/MIN (H): ICD-10-CM

## 2021-07-01 DIAGNOSIS — N18.31 STAGE 3A CHRONIC KIDNEY DISEASE (H): Primary | ICD-10-CM

## 2021-07-01 DIAGNOSIS — E78.5 HYPERLIPIDEMIA LDL GOAL <100: ICD-10-CM

## 2021-07-01 DIAGNOSIS — I10 HYPERTENSION GOAL BP (BLOOD PRESSURE) < 140/90: ICD-10-CM

## 2021-07-02 RX ORDER — METOPROLOL TARTRATE 25 MG/1
TABLET, FILM COATED ORAL
Qty: 180 TABLET | Refills: 2 | Status: SHIPPED | OUTPATIENT
Start: 2021-07-02 | End: 2021-10-25 | Stop reason: ALTCHOICE

## 2021-07-02 RX ORDER — ATORVASTATIN CALCIUM 20 MG/1
20 TABLET, FILM COATED ORAL DAILY
Qty: 30 TABLET | Refills: 0 | Status: SHIPPED | OUTPATIENT
Start: 2021-07-02 | End: 2021-07-29

## 2021-07-02 RX ORDER — LISINOPRIL 5 MG/1
5 TABLET ORAL DAILY
Qty: 30 TABLET | Refills: 0 | Status: SHIPPED | OUTPATIENT
Start: 2021-07-02 | End: 2021-07-29

## 2021-07-02 NOTE — TELEPHONE ENCOUNTER
"Requested Prescriptions   Pending Prescriptions Disp Refills     metoprolol tartrate (LOPRESSOR) 25 MG tablet [Pharmacy Med Name: METOPROLOL TARTRATE 25MG TAB] 180 tablet 4     Sig: TAKE 1 TABLET BY MOUTH TWICE A DAY *MUST MAKE APPT. BEFORE ANY ADDITIONAL REFILLS*   6/15/2021    Beta-Blockers Protocol Passed - 7/1/2021  3:22 PM        Passed - Blood pressure under 140/90 in past 12 months     BP Readings from Last 3 Encounters:   03/26/21 122/70   03/21/21 128/78   03/03/20 122/81                 Passed - Patient is age 6 or older        Passed - Recent (12 mo) or future (30 days) visit within the authorizing provider's specialty     Patient has had an office visit with the authorizing provider or a provider within the authorizing providers department within the previous 12 mos or has a future within next 30 days. See \"Patient Info\" tab in inbasket, or \"Choose Columns\" in Meds & Orders section of the refill encounter.              Passed - Medication is active on med list      Prescription approved per [facility] Refill Protocol.       lisinopril (ZESTRIL) 5 MG tablet [Pharmacy Med Name: LISINOPRIL 5MG TABLET] 90 tablet 4     Sig: TAKE 1 TABLET (5 MG) BY MOUTH DAILY   6/15/2020 for 90 tablets and 4 refills      ACE Inhibitors (Including Combos) Protocol Failed - 7/1/2021  3:22 PM        Failed - Normal serum creatinine on file in past 12 months     Recent Labs   Lab Test 03/03/20  0816   CR 1.38*       Ok to refill medication if creatinine is low          Failed - Normal serum potassium on file in past 12 months     Recent Labs   Lab Test 03/03/20  0816   POTASSIUM 4.4             Passed - Blood pressure under 140/90 in past 12 months     BP Readings from Last 3 Encounters:   03/26/21 122/70   03/21/21 128/78   03/03/20 122/81                 Passed - Recent (12 mo) or future (30 days) visit within the authorizing provider's specialty     Patient has had an office visit with the authorizing provider or a provider within " "the authorizing providers department within the previous 12 mos or has a future within next 30 days. See \"Patient Info\" tab in inbasket, or \"Choose Columns\" in Meds & Orders section of the refill encounter.              Passed - Medication is active on med list        Passed - Patient is age 18 or older      Routing refill request to provider for review/approval          atorvastatin (LIPITOR) 20 MG tablet [Pharmacy Med Name: ATORVASTATIN 20MG TABLET] 90 tablet 4     Sig: TAKE 1 TABLET BY MOUTH EVERY DAY *MUST MAKE APPT. BEFORE ANY ADDITIONAL REFILLS*       Statins Protocol Failed - 7/1/2021  3:22 PM        Failed - LDL on file in past 12 months     Recent Labs   Lab Test 04/05/19  0936   LDL 67             Passed - No abnormal creatine kinase in past 12 months     No lab results found.             Passed - Recent (12 mo) or future (30 days) visit within the authorizing provider's specialty     Patient has had an office visit with the authorizing provider or a provider within the authorizing providers department within the previous 12 mos or has a future within next 30 days. See \"Patient Info\" tab in inbasket, or \"Choose Columns\" in Meds & Orders section of the refill encounter.              Passed - Medication is active on med list        Passed - Patient is age 18 or older         Routing refill request to provider for review/approval   Michelle Schmidt RN          "

## 2021-07-02 NOTE — TELEPHONE ENCOUNTER
lisinopril (ZESTRIL) 5 MG tablet [Pharmacy Med Name: LISINOPRIL 5MG TABLET] 90 tablet 4     Sig: TAKE 1 TABLET (5 MG) BY MOUTH DAILY   6/15/2020 for 90 tablets and 4 refills     Routing refill request to provider for review/approval because:  Labs out of range:  CR on 3/3/2020 was 1.38  Labs not current:  CR, Potassium  T'd up 1 month for provider review.      atorvastatin (LIPITOR) 20 MG tablet [Pharmacy Med Name: ATORVASTATIN 20MG TABLET] 90 tablet 4    Sig: TAKE 1 TABLET BY MOUTH EVERY DAY *MUST MAKE APPT. BEFORE ANY ADDITIONAL REFILLS*   Routing refill request to provider for review/approval because:  Labs not current:  Lipid panel  T'd up 1 month for provider review.      Michelle Schmidt RN

## 2021-07-26 DIAGNOSIS — E78.5 HYPERLIPIDEMIA LDL GOAL <100: ICD-10-CM

## 2021-07-26 DIAGNOSIS — I10 HYPERTENSION GOAL BP (BLOOD PRESSURE) < 140/90: ICD-10-CM

## 2021-07-26 DIAGNOSIS — N18.31 STAGE 3A CHRONIC KIDNEY DISEASE (H): ICD-10-CM

## 2021-07-28 NOTE — TELEPHONE ENCOUNTER
"Lipitor  Last Written Prescription Date:  7/2/21  Last Fill Quantity: 30,  # refills: 0   Last office visit   6/15/20     Future Office Visit: NONE    LISINOPRIL  Last Written Prescription Date:  7/2/21  Last Fill Quantity: 30,  # refills: 0   Last office visit:   6/15/20      Future Office Visit:  NONE  Requested Prescriptions   Pending Prescriptions Disp Refills     lisinopril (ZESTRIL) 5 MG tablet [Pharmacy Med Name: LISINOPRIL 5MG TABLET] 30 tablet 0     Sig: TAKE 1 TABLET (5 MG) BY MOUTH DAILY       ACE Inhibitors (Including Combos) Protocol Failed - 7/26/2021  2:44 PM        Failed - Normal serum creatinine on file in past 12 months     Recent Labs   Lab Test 03/03/20  0816   CR 1.38*       Ok to refill medication if creatinine is low          Failed - Normal serum potassium on file in past 12 months     Recent Labs   Lab Test 03/03/20  0816   POTASSIUM 4.4             Passed - Blood pressure under 140/90 in past 12 months     BP Readings from Last 3 Encounters:   03/26/21 122/70   03/21/21 128/78   03/03/20 122/81                 Passed - Recent (12 mo) or future (30 days) visit within the authorizing provider's specialty     Patient has had an office visit with the authorizing provider or a provider within the authorizing providers department within the previous 12 mos or has a future within next 30 days. See \"Patient Info\" tab in inbasket, or \"Choose Columns\" in Meds & Orders section of the refill encounter.              Passed - Medication is active on med list        Passed - Patient is age 18 or older           atorvastatin (LIPITOR) 20 MG tablet [Pharmacy Med Name: ATORVASTATIN 20MG TABLET] 30 tablet 0     Sig: TAKE 1 TABLET (20 MG) BY MOUTH DAILY       Statins Protocol Failed - 7/26/2021  2:44 PM        Failed - LDL on file in past 12 months     Recent Labs   Lab Test 04/05/19  0936   LDL 67           Passed - No abnormal creatine kinase in past 12 months     No lab results found. " "          Passed - Recent (12 mo) or future (30 days) visit within the authorizing provider's specialty     Patient has had an office visit with the authorizing provider or a provider within the authorizing providers department within the previous 12 mos or has a future within next 30 days. See \"Patient Info\" tab in inbasket, or \"Choose Columns\" in Meds & Orders section of the refill encounter.            Passed - Medication is active on med list        Passed - Patient is age 18 or older         Routing refill request to provider for review/approval because:  Labs out of range:  See Cr+ above.   Labs not current:  See above  Patient needs to be seen because it has been more than 1 year since last office visit.  CHRISTINA Escobar                               "

## 2021-07-29 RX ORDER — LISINOPRIL 5 MG/1
5 TABLET ORAL DAILY
Qty: 30 TABLET | Refills: 0 | Status: SHIPPED | OUTPATIENT
Start: 2021-07-29 | End: 2021-09-16

## 2021-07-29 RX ORDER — ATORVASTATIN CALCIUM 20 MG/1
20 TABLET, FILM COATED ORAL DAILY
Qty: 30 TABLET | Refills: 0 | Status: SHIPPED | OUTPATIENT
Start: 2021-07-29 | End: 2021-09-01

## 2021-08-30 DIAGNOSIS — E78.5 HYPERLIPIDEMIA LDL GOAL <100: ICD-10-CM

## 2021-08-30 DIAGNOSIS — N18.31 STAGE 3A CHRONIC KIDNEY DISEASE (H): ICD-10-CM

## 2021-08-31 NOTE — TELEPHONE ENCOUNTER
Pending Prescriptions:                       Disp   Refills    atorvastatin (LIPITOR) 20 MG tablet [Pharm*30 tab*0        Sig: TAKE 1 TABLET (20 MG) BY MOUTH DAILY    Routing refill request to provider for review/approval because:  Corrie given x1 and patient did not follow up, please advise  Labs not current:    LDL Cholesterol Calculated   Date Value Ref Range Status   04/05/2019 67 <100 mg/dL Final     Comment:     Desirable:       <100 mg/dl

## 2021-09-01 ENCOUNTER — MYC MEDICAL ADVICE (OUTPATIENT)
Dept: FAMILY MEDICINE | Facility: CLINIC | Age: 71
End: 2021-09-01

## 2021-09-01 RX ORDER — ATORVASTATIN CALCIUM 20 MG/1
20 TABLET, FILM COATED ORAL DAILY
Qty: 30 TABLET | Refills: 0 | Status: SHIPPED | OUTPATIENT
Start: 2021-09-01 | End: 2021-09-16

## 2021-09-01 NOTE — TELEPHONE ENCOUNTER
Patient needs follow up appointment prior to additional refills.   Electronically signed by Ryan Perera MD    Patient informed via [employer] to schedule an appointment.   Letitia Hummel MA

## 2021-09-13 ENCOUNTER — APPOINTMENT (OUTPATIENT)
Dept: URBAN - METROPOLITAN AREA CLINIC 259 | Age: 71
Setting detail: DERMATOLOGY
End: 2021-09-13

## 2021-09-13 DIAGNOSIS — D22 MELANOCYTIC NEVI: ICD-10-CM

## 2021-09-13 DIAGNOSIS — D18.0 HEMANGIOMA: ICD-10-CM

## 2021-09-13 DIAGNOSIS — L57.8 OTHER SKIN CHANGES DUE TO CHRONIC EXPOSURE TO NONIONIZING RADIATION: ICD-10-CM

## 2021-09-13 DIAGNOSIS — L82.1 OTHER SEBORRHEIC KERATOSIS: ICD-10-CM

## 2021-09-13 PROBLEM — D22.5 MELANOCYTIC NEVI OF TRUNK: Status: ACTIVE | Noted: 2021-09-13

## 2021-09-13 PROBLEM — D18.01 HEMANGIOMA OF SKIN AND SUBCUTANEOUS TISSUE: Status: ACTIVE | Noted: 2021-09-13

## 2021-09-13 PROCEDURE — OTHER MIPS QUALITY: OTHER

## 2021-09-13 PROCEDURE — OTHER COUNSELING: OTHER

## 2021-09-13 PROCEDURE — 99213 OFFICE O/P EST LOW 20 MIN: CPT

## 2021-09-13 ASSESSMENT — LOCATION SIMPLE DESCRIPTION DERM
LOCATION SIMPLE: RIGHT UPPER BACK
LOCATION SIMPLE: UPPER BACK

## 2021-09-13 ASSESSMENT — LOCATION DETAILED DESCRIPTION DERM
LOCATION DETAILED: SUPERIOR THORACIC SPINE
LOCATION DETAILED: RIGHT MEDIAL UPPER BACK
LOCATION DETAILED: RIGHT SUPERIOR MEDIAL UPPER BACK

## 2021-09-13 ASSESSMENT — LOCATION ZONE DERM: LOCATION ZONE: TRUNK

## 2021-09-13 NOTE — PROCEDURE: MIPS QUALITY
Quality 130: Documentation Of Current Medications In The Medical Record: Current Medications Documented
Quality 110: Preventive Care And Screening: Influenza Immunization: Influenza Immunization Ordered or Recommended, but not Administered due to system reason
Quality 226: Preventive Care And Screening: Tobacco Use: Screening And Cessation Intervention: Patient screened for tobacco use and is an ex/non-smoker
Detail Level: Detailed
Quality 111:Pneumonia Vaccination Status For Older Adults: Pneumococcal Vaccination not Administered or Previously Received, Reason not Otherwise Specified
Quality 431: Preventive Care And Screening: Unhealthy Alcohol Use - Screening: Patient not identified as an unhealthy alcohol user when screened for unhealthy alcohol use using a systematic screening method
No

## 2021-09-13 NOTE — HPI: FULL BODY SKIN EXAMINATION
What Type Of Note Output Would You Prefer (Optional)?: Standard Output
What Is The Reason For Today's Visit?: Full Body Skin Examination
What Is The Reason For Today's Visit? (Being Monitored For X): concerning skin lesions on an annual basis
Additional History: Patient reports no major concerns today.

## 2021-09-16 ENCOUNTER — MYC REFILL (OUTPATIENT)
Dept: FAMILY MEDICINE | Facility: CLINIC | Age: 71
End: 2021-09-16

## 2021-09-16 DIAGNOSIS — N18.31 STAGE 3A CHRONIC KIDNEY DISEASE (H): ICD-10-CM

## 2021-09-16 DIAGNOSIS — I10 HYPERTENSION GOAL BP (BLOOD PRESSURE) < 140/90: ICD-10-CM

## 2021-09-16 DIAGNOSIS — E78.5 HYPERLIPIDEMIA LDL GOAL <100: ICD-10-CM

## 2021-09-17 RX ORDER — LISINOPRIL 5 MG/1
5 TABLET ORAL DAILY
Qty: 30 TABLET | Refills: 0 | Status: SHIPPED | OUTPATIENT
Start: 2021-09-17 | End: 2021-10-14

## 2021-09-17 RX ORDER — ATORVASTATIN CALCIUM 20 MG/1
20 TABLET, FILM COATED ORAL DAILY
Qty: 30 TABLET | Refills: 0 | Status: SHIPPED | OUTPATIENT
Start: 2021-09-17 | End: 2021-10-25

## 2021-09-17 NOTE — TELEPHONE ENCOUNTER
"Routing refill request to provider for review/approval because:  Labs out of range:  CR  Labs not current:  CR, Potassium        Requested Prescriptions   Pending Prescriptions Disp Refills     lisinopril (ZESTRIL) 5 MG tablet 30 tablet 0     Sig: Take 1 tablet (5 mg) by mouth daily     Last office visit: 6/15/2020  Future Office Visit:   Next 5 appointments (look out 90 days)    Oct 25, 2021  5:20 PM  Eladio Amaya with Ryan Perera MD  [facility] ([facility] ) [address] [place] [id number] [phone]             ACE Inhibitors (Including Combos) Protocol Failed - 9/16/2021 11:49 AM        Failed - Recent (12 mo) or future (30 days) visit within the authorizing provider's specialty     Patient has had an office visit with the authorizing provider or a provider within the authorizing providers department within the previous 12 mos or has a future within next 30 days. See \"Patient Info\" tab in inbasket, or \"Choose Columns\" in Meds & Orders section of the refill encounter.              Failed - Normal serum creatinine on file in past 12 months     Recent Labs   Lab Test 03/03/20  0816   CR 1.38*       Ok to refill medication if creatinine is low          Failed - Normal serum potassium on file in past 12 months     Recent Labs   Lab Test 03/03/20  0816   POTASSIUM 4.4             Passed - Blood pressure under 140/90 in past 12 months     BP Readings from Last 3 Encounters:   03/26/21 122/70   03/21/21 128/78   03/03/20 122/81                 Passed - Medication is active on med list        Passed - Patient is age 18 or older         Michelle Schmidt RN      "

## 2021-09-17 NOTE — TELEPHONE ENCOUNTER
Routing refill request to provider for review/approval because:  Labs not current:  LDL    CLINT CortezN, RN  [facility]

## 2021-10-13 DIAGNOSIS — I10 HYPERTENSION GOAL BP (BLOOD PRESSURE) < 140/90: ICD-10-CM

## 2021-10-14 RX ORDER — LISINOPRIL 5 MG/1
5 TABLET ORAL DAILY
Qty: 30 TABLET | Refills: 0 | Status: SHIPPED | OUTPATIENT
Start: 2021-10-14 | End: 2021-10-25

## 2021-10-14 NOTE — TELEPHONE ENCOUNTER
Lisinopril  Routing refill request to provider for review/approval because:  Labs out of range:  see Cr+   Labs not current:   K+ and Cr+  CHRISTINA Escobar

## 2021-10-23 ENCOUNTER — HEALTH MAINTENANCE LETTER (OUTPATIENT)
Age: 71
End: 2021-10-23

## 2021-10-24 ENCOUNTER — MYC MEDICAL ADVICE (OUTPATIENT)
Dept: FAMILY MEDICINE | Facility: CLINIC | Age: 71
End: 2021-10-24

## 2021-10-25 ENCOUNTER — OFFICE VISIT (OUTPATIENT)
Dept: FAMILY MEDICINE | Facility: CLINIC | Age: 71
End: 2021-10-25
Payer: COMMERCIAL

## 2021-10-25 VITALS
WEIGHT: 212.6 LBS | SYSTOLIC BLOOD PRESSURE: 128 MMHG | OXYGEN SATURATION: 98 % | HEART RATE: 55 BPM | HEIGHT: 72 IN | RESPIRATION RATE: 16 BRPM | DIASTOLIC BLOOD PRESSURE: 76 MMHG | BODY MASS INDEX: 28.79 KG/M2 | TEMPERATURE: 97.4 F

## 2021-10-25 DIAGNOSIS — I48.91 ATRIAL FIBRILLATION WITH RVR (H): ICD-10-CM

## 2021-10-25 DIAGNOSIS — E78.5 HYPERLIPIDEMIA LDL GOAL <100: ICD-10-CM

## 2021-10-25 DIAGNOSIS — Z11.59 ENCOUNTER FOR HCV SCREENING TEST FOR LOW RISK PATIENT: ICD-10-CM

## 2021-10-25 DIAGNOSIS — I10 HYPERTENSION GOAL BP (BLOOD PRESSURE) < 140/90: ICD-10-CM

## 2021-10-25 DIAGNOSIS — Z00.00 MEDICARE ANNUAL WELLNESS VISIT, SUBSEQUENT: Primary | ICD-10-CM

## 2021-10-25 DIAGNOSIS — N18.31 STAGE 3A CHRONIC KIDNEY DISEASE (H): ICD-10-CM

## 2021-10-25 LAB
ANION GAP SERPL CALCULATED.3IONS-SCNC: 2 MMOL/L (ref 3–14)
BUN SERPL-MCNC: 24 MG/DL (ref 7–30)
CALCIUM SERPL-MCNC: 9 MG/DL (ref 8.5–10.1)
CHLORIDE BLD-SCNC: 110 MMOL/L (ref 94–109)
CHOLEST SERPL-MCNC: 139 MG/DL
CO2 SERPL-SCNC: 29 MMOL/L (ref 20–32)
CREAT SERPL-MCNC: 1.26 MG/DL (ref 0.66–1.25)
FASTING STATUS PATIENT QL REPORTED: NO
GFR SERPL CREATININE-BSD FRML MDRD: 58 ML/MIN/1.73M2
GLUCOSE BLD-MCNC: 97 MG/DL (ref 70–99)
HDLC SERPL-MCNC: 45 MG/DL
LDLC SERPL CALC-MCNC: 64 MG/DL
NONHDLC SERPL-MCNC: 94 MG/DL
POTASSIUM BLD-SCNC: 4.3 MMOL/L (ref 3.4–5.3)
SODIUM SERPL-SCNC: 141 MMOL/L (ref 133–144)
TRIGL SERPL-MCNC: 148 MG/DL

## 2021-10-25 PROCEDURE — 80061 LIPID PANEL: CPT | Performed by: FAMILY MEDICINE

## 2021-10-25 PROCEDURE — 86803 HEPATITIS C AB TEST: CPT | Performed by: FAMILY MEDICINE

## 2021-10-25 PROCEDURE — 80048 BASIC METABOLIC PNL TOTAL CA: CPT | Performed by: FAMILY MEDICINE

## 2021-10-25 PROCEDURE — 36415 COLL VENOUS BLD VENIPUNCTURE: CPT | Performed by: FAMILY MEDICINE

## 2021-10-25 PROCEDURE — 99213 OFFICE O/P EST LOW 20 MIN: CPT | Mod: 25 | Performed by: FAMILY MEDICINE

## 2021-10-25 PROCEDURE — 99397 PER PM REEVAL EST PAT 65+ YR: CPT | Performed by: FAMILY MEDICINE

## 2021-10-25 RX ORDER — ATORVASTATIN CALCIUM 20 MG/1
20 TABLET, FILM COATED ORAL DAILY
Qty: 90 TABLET | Refills: 4 | Status: SHIPPED | OUTPATIENT
Start: 2021-10-25 | End: 2022-12-08

## 2021-10-25 RX ORDER — LISINOPRIL 5 MG/1
5 TABLET ORAL DAILY
Qty: 90 TABLET | Refills: 4 | Status: SHIPPED | OUTPATIENT
Start: 2021-10-25 | End: 2022-12-08

## 2021-10-25 RX ORDER — METOPROLOL SUCCINATE 25 MG/1
25 TABLET, EXTENDED RELEASE ORAL DAILY
Qty: 90 TABLET | Refills: 4 | Status: SHIPPED | OUTPATIENT
Start: 2021-10-25 | End: 2022-12-14

## 2021-10-25 ASSESSMENT — MIFFLIN-ST. JEOR: SCORE: 1759.41

## 2021-10-25 NOTE — TELEPHONE ENCOUNTER
Patient has an appointment this evening to review medications. He is wondering if he needs to fast. Last lipid panel was 7 months ago, scanned into chart.    Will route to PCP.    YOCASTA Cortez, RN  [facility]

## 2021-10-25 NOTE — PROGRESS NOTES
"  Assessment & Plan     Medicare annual wellness visit, subsequent  Health maintenance reviewed and up to date.    Atrial fibrillation with RVR (H)  Chronic, remains in Sinus rhythm. He would like to switch from metoprolol to Toprol for conveninence.   - metoprolol succinate ER (TOPROL-XL) 25 MG 24 hr tablet; Take 1 tablet (25 mg) by mouth daily  - OFFICE/OUTPT VISIT,EST,LEVL III    Stage 3a chronic kidney disease (H)  Chronic, stable. Continue ACEI, statin, ASA. Avoid NSAIDs.  - Basic metabolic panel  (Ca, Cl, CO2, Creat, Gluc, K, Na, BUN); Future  - atorvastatin (LIPITOR) 20 MG tablet; Take 1 tablet (20 mg) by mouth daily  - Basic metabolic panel  (Ca, Cl, CO2, Creat, Gluc, K, Na, BUN)  - OFFICE/OUTPT VISIT,EST,LEVL III    Encounter for HCV screening test for low risk patient  - Hepatitis C antibody; Future  - Hepatitis C antibody    Hyperlipidemia LDL goal <100  Chronic controlled. The current medical regimen is effective;  continue present plan and medications.   - Lipid panel reflex to direct LDL Fasting; Future  - atorvastatin (LIPITOR) 20 MG tablet; Take 1 tablet (20 mg) by mouth daily  - Lipid panel reflex to direct LDL Fasting  - OFFICE/OUTPT VISIT,EST,LEVL III    Hypertension goal BP (blood pressure) < 140/90  Chronic controlled.  He would like to switch from metoprolol to Toprol for conveninence.   - metoprolol succinate ER (TOPROL-XL) 25 MG 24 hr tablet; Take 1 tablet (25 mg) by mouth daily  - lisinopril (ZESTRIL) 5 MG tablet; Take 1 tablet (5 mg) by mouth daily  - OFFICE/OUTPT VISIT,EST,LEVL III    Ordering of each unique test  Prescription drug management         BMI:   Estimated body mass index is 29.24 kg/m  as calculated from the following:    Height as of this encounter: 1.816 m (5' 11.5\").    Weight as of this encounter: 96.4 kg (212 lb 9.6 oz).       SELF MONITORING:       - Please check blood pressure readings daily  Work on weight loss  Regular exercise    Return in about 1 year (around " "10/25/2022) for Follow up, Routine preventive, with me, in person.    Ryan Perera MD  [facility]    Joshua Demarco is a 71 year old who presents for the following health issues     HPI     Hypertension Follow-up      Do you check your blood pressure regularly outside of the clinic? Yes     Are you following a low salt diet? Yes trying to    Are your blood pressures ever more than 140 on the top number (systolic) OR more   than 90 on the bottom number (diastolic), for example 140/90? Yes once in a great while    Chronic Kidney Disease Follow-up      Do you take any over the counter pain medicine?: No      How many servings of fruits and vegetables do you eat daily?  2-3    On average, how many sweetened beverages do you drink each day (Examples: soda, juice, sweet tea, etc.  Do NOT count diet or artificially sweetened beverages)?   0    How many days per week do you exercise enough to make your heart beat faster? 5    How many minutes a day do you exercise enough to make your heart beat faster? 30 - 60    How many days per week do you miss taking your medication? 0    Annual Wellness Visit    Patient has been advised of split billing requirements and indicates understanding: Yes     Are you in the first 12 months of your Medicare Part B coverage?  No    Physical Health:    In general, how would you rate your overall physical health? good    Outside of work, how many days during the week do you exercise?4-5 days/week    Outside of work, approximately how many minutes a day do you exercise?30-45 minutes    If you drink alcohol do you typically have >3 drinks per day or >7 drinks per week? No    Do you usually eat at least 4 servings of fruit and vegetables a day, include whole grains & fiber and avoid regularly eating high fat or \"junk\" foods? Yes    Do you have any problems taking medications regularly? No    Do you have any side effects from medications? none    Needs assistance for the " "following daily activities: no assistance needed    Which of the following safety concerns are present in your home?  lack of grab bars in the bathroom     Hearing impairment: Yes, has hearing aids    In the past 6 months, have you been bothered by leaking of urine? no    Mental Health:    In general, how would you rate your overall mental or emotional health? good  PHQ-2 Score: 0    Do you feel safe in your environment? Yes    Have you ever done Advance Care Planning? (For example, a Health Directive, POLST, or a discussion with a medical provider or your loved ones about your wishes)? Yes, advance care planning is on file.    Fall risk:  Fallen 2 or more times in the past year?: No  Any fall with injury in the past year?: No    Cognitive Screenin) Repeat 3 items (Leader, Season, Table)    2) Clock draw: NORMAL  3) 3 item recall: Recalls 3 objects  Results: NORMAL clock, 3 items recalled: COGNITIVE IMPAIRMENT LESS LIKELY    Mini-CogTM Copyright S Linda. Licensed by the author for use in [facility]; reprinted with permission (gualberto@[facility]). All rights reserved.      Do you have sleep apnea, excessive snoring or daytime drowsiness?: yes sleep apnea    Current providers sharing in care for this patient include:   Patient Care Team:  Ryan Perera MD as PCP - General  Ryan Perera MD as Assigned PCP  Jerrica Castro MD as Assigned Musculoskeletal Provider    Patient has been advised of split billing requirements and indicates understanding: Yes    Review of Systems   CONSTITUTIONAL: NEGATIVE for fever, chills, change in weight  ENT/MOUTH: NEGATIVE for ear, mouth and throat problems  RESP: NEGATIVE for significant cough or SOB  CV: NEGATIVE for chest pain, palpitations or peripheral edema  ROS otherwise negative      Objective    /76   Pulse 55   Temp 97.4  F (36.3  C) (Temporal)   Resp 16   Ht 1.816 m (5' 11.5\")   Wt 96.4 kg (212 lb 9.6 oz)   SpO2 98%   BMI 29.24 kg/m  "   Body mass index is 29.24 kg/m .  Physical Exam   GENERAL: healthy, alert and no distress  EYES: Eyes grossly normal to inspection, PERRL and conjunctivae and sclerae normal  HENT: ear canals and TM's normal, nose and mouth without ulcers or lesions  NECK: no adenopathy, no asymmetry, masses, or scars and thyroid normal to palpation  RESP: lungs clear to auscultation - no rales, rhonchi or wheezes  CV: regular rate and rhythm, normal S1 S2, no S3 or S4, no murmur, click or rub, no peripheral edema and peripheral pulses strong  ABDOMEN: soft, nontender, no hepatosplenomegaly, no masses and bowel sounds normal  MS: no gross musculoskeletal defects noted, no edema    Orders Only on 08/11/2020   Component Date Value Ref Range Status     COVID-19 Virus PCR to [facility] - So* 08/11/2020 Nasopharyngeal   Final     COVID-19 Virus PCR to [facility] - Re* 08/11/2020 Not Detected   Final    Comment: Collection of multiple specimens from the same patient may be necessary to   detect the virus. The possibility of a false negative should be considered if   the patient's recent exposure or clinical presentation suggests 2019 nCOV   infection and diagnostic tests for other causes of illness are negative.   Repeat testing may be considered in this setting.  Viral RNA was extracted via a validated method and subsequently underwent   single step reverse transcriptase-real time polymerase chain reaction using   primers to the CDC specified N1,N2 gene targets of CoV2 and human RNP as an   internal control.  A negative result does not rule out the presence of real-time PCR inhibitors   in the specimen or COVID-19 RNA in concentrations below the limit of detection   of the assay. The possibility of a false negative should be considered if the   patients recent exposure or clinical presentation suggests COVID-19.   Additional testing or repeat testing requires consultation with the   abel Arreguin  specimen is the preferred choice for swab-based SARS CoV2   testing. When collection of a nasopharyngeal swab is not possible the   following are acceptable alternatives:  an oropharyngeal (OP) specimen collected by a healthcare professional, or a   nasal mid-turbinate (NMT) swab collected by a healthcare professional or by   onsite self-collection (using a flocked tapered swab), or an anterior nares   specimen collected by a healthcare professional or by onsite self-collection   (using a round foam swab). (Centers for Disease Control)  Testing performed by [facility], Room 1[address]210, [address] [place] [address]. This test was developed and its   performance characteristics determined by the [facility]. It has not been cleared or approved by the FDA.  The laboratory is regulated under the Clinical Laboratory Improvement   Amendments of 1988 (CLIA-88) as qualified to perform high-complexity testin                           g.   This test is used for clinical purposes. It should not be regarded as   investigational or for research.

## 2021-10-26 LAB — HCV AB SERPL QL IA: NONREACTIVE

## 2021-11-01 ENCOUNTER — IMMUNIZATION (OUTPATIENT)
Dept: FAMILY MEDICINE | Facility: CLINIC | Age: 71
End: 2021-11-01
Payer: COMMERCIAL

## 2021-11-01 DIAGNOSIS — Z23 NEED FOR PROPHYLACTIC VACCINATION AND INOCULATION AGAINST INFLUENZA: Primary | ICD-10-CM

## 2021-11-01 PROCEDURE — 99207 PR NO CHARGE NURSE ONLY: CPT

## 2021-11-01 PROCEDURE — 91300 PR COVID VAC PFIZER DIL RECON 30 MCG/0.3 ML IM: CPT

## 2021-11-01 PROCEDURE — 90662 IIV NO PRSV INCREASED AG IM: CPT

## 2021-11-01 PROCEDURE — 0003A PR COVID VAC PFIZER DIL RECON 30 MCG/0.3 ML IM: CPT

## 2021-11-01 PROCEDURE — G0008 ADMIN INFLUENZA VIRUS VAC: HCPCS

## 2022-07-30 ENCOUNTER — HOSPITAL ENCOUNTER (EMERGENCY)
Facility: CLINIC | Age: 72
Discharge: HOME | End: 2022-07-30
Attending: PHYSICIAN ASSISTANT | Admitting: PHYSICIAN ASSISTANT
Payer: MEDICARE

## 2022-07-30 VITALS
TEMPERATURE: 98.8 F | RESPIRATION RATE: 16 BRPM | BODY MASS INDEX: 30.17 KG/M2 | WEIGHT: 219.4 LBS | HEART RATE: 56 BPM | OXYGEN SATURATION: 98 % | DIASTOLIC BLOOD PRESSURE: 76 MMHG | SYSTOLIC BLOOD PRESSURE: 128 MMHG

## 2022-07-30 DIAGNOSIS — N30.01 ACUTE CYSTITIS WITH HEMATURIA: ICD-10-CM

## 2022-07-30 LAB
ALBUMIN UR-MCNC: 100 MG/DL
APPEARANCE UR: CLEAR
BACTERIA #/AREA URNS HPF: ABNORMAL /HPF
BILIRUB UR QL STRIP: NEGATIVE
COLOR UR AUTO: YELLOW
GLUCOSE UR STRIP-MCNC: NEGATIVE MG/DL
HGB UR QL STRIP: ABNORMAL
KETONES UR STRIP-MCNC: NEGATIVE MG/DL
LEUKOCYTE ESTERASE UR QL STRIP: ABNORMAL
MUCOUS THREADS #/AREA URNS LPF: PRESENT /LPF
NITRATE UR QL: POSITIVE
PH UR STRIP: 6 [PH] (ref 5–7)
RBC URINE: >182 /HPF
SP GR UR STRIP: >=1.03 (ref 1–1.03)
SQUAMOUS EPITHELIAL: 4 /HPF
UROBILINOGEN UR STRIP-MCNC: NORMAL MG/DL
WBC CLUMPS #/AREA URNS HPF: PRESENT /HPF
WBC URINE: >182 /HPF

## 2022-07-30 PROCEDURE — 87086 URINE CULTURE/COLONY COUNT: CPT | Performed by: PHYSICIAN ASSISTANT

## 2022-07-30 PROCEDURE — 81001 URINALYSIS AUTO W/SCOPE: CPT | Performed by: PHYSICIAN ASSISTANT

## 2022-07-30 PROCEDURE — 99284 EMERGENCY DEPT VISIT MOD MDM: CPT | Performed by: PHYSICIAN ASSISTANT

## 2022-07-30 PROCEDURE — 99283 EMERGENCY DEPT VISIT LOW MDM: CPT | Performed by: PHYSICIAN ASSISTANT

## 2022-07-30 RX ORDER — SULFAMETHOXAZOLE/TRIMETHOPRIM 800-160 MG
1 TABLET ORAL 2 TIMES DAILY
Qty: 14 TABLET | Refills: 0 | Status: SHIPPED | OUTPATIENT
Start: 2022-07-30 | End: 2022-08-06

## 2022-07-30 NOTE — ED TRIAGE NOTES
Pt reports new onset pain with urination with increased urgency this morning. Denies persistent pain, but pain with urination. Pt states blood in urine, sample is brown and blood tinged. Denies fevers. VSS.     Triage Assessment     Row Name 07/30/22 1215       Triage Assessment (Adult)    Airway WDL WDL       Respiratory WDL    Respiratory WDL WDL       Skin Circulation/Temperature WDL    Skin Circulation/Temperature WDL WDL       Cardiac WDL    Cardiac WDL WDL       Peripheral/Neurovascular WDL    Peripheral Neurovascular WDL WDL       Cognitive/Neuro/Behavioral WDL    Cognitive/Neuro/Behavioral WDL WDL

## 2022-07-30 NOTE — ED PROVIDER NOTES
History     Chief Complaint   Patient presents with     Urinary Frequency       HPI  Dav Seay is a 72 year old male who presents to the emergency department complaining of urinary frequency. The patient reports last night he had increased urinary frequency, getting up about 3 times to urinate overnight.  Typically he only has to get up once.  Today he noticed some burning with urination and a little bit of blood in the urine.  This has never happened before.  He denies any associated abdominal or flank pain.  He denies any fevers or nausea/vomiting.  No pain with bowel movements or pain in the rectum.  He has not taken anything for his symptoms.        Allergies:  Allergies   Allergen Reactions     No Known Drug Allergies        Problem List:    Patient Active Problem List    Diagnosis Date Noted     Atrial fibrillation with RVR (H) 10/25/2021     Priority: Medium     CKD (chronic kidney disease) stage 3, GFR 30-59 ml/min (H) 01/08/2018     Priority: Medium     Carpal tunnel syndrome of left wrist 07/25/2017     Priority: Medium     Carpal tunnel syndrome of right wrist 07/25/2017     Priority: Medium     Insomnia, transient 12/02/2013     Priority: Medium     Advance Care Planning 11/30/2012     Priority: Medium     Advance Care Planning 9/16/2015: Receipt of ACP document:  Received: Health Care Directive which was witnessed or notarized on 8/29/15.  Document not previously scanned.  Validation form completed and sent with document to be scanned.  Code Status needs to be updated to reflect choices in most recent ACP document. Confirmed/documented designated decision maker(s).  Added by Tara Davis, RN, BSN, MA, Advance Care Planning Liaison.  Advance Care Planning 11/30/12: Patient states has Advance Directive and will bring in a copy to clinic. 11/30/2012  Henri/FLOR          Hypertension goal BP (blood pressure) < 140/90 02/15/2012     Priority: Medium     CARDIOVASCULAR SCREENING; LDL GOAL LESS  THAN 160 10/31/2010     Priority: Medium        Past Medical History:    Past Medical History:   Diagnosis Date     Hypertension      Traumatic amputation of other finger(s) (complete) (partial), without mention of complication 1975       Past Surgical History:    Past Surgical History:   Procedure Laterality Date     COLONOSCOPY  6/10/2013    Procedure: COLONOSCOPY;  Colonoscopy;  Surgeon: Wes Pablo MD;  Location: [facility]     COLONOSCOPY N/A 5/20/2019    Procedure: COLONOSCOPY;  Surgeon: Jesus Harris MD;  Location: [facility]     HC REMOVAL OF TONSILS,<13 Y/O  5 years old    Tonsils <14y.o.     HERNIA REPAIR, INGUINAL RT/LT       JOINT REPLACEMENT, HIP RT/LT  12/19/11    Left total hip arthroplasty. [facility]     JOINT REPLACEMENT, HIP RT/LT  12/18/2017    Right tota hip arthroplasty. [facility]     RELEASE CARPAL TUNNEL Left 10/18/2017    Procedure: RELEASE CARPAL TUNNEL;  Left Carpal Tunnel Release;  Surgeon: Norris Townsend MD;  Location: [facility]     ZZHC COLONOSCOPY THRU STOMA W BIOPSY/CAUTERY TUMOR/POLYP/LESION  2003    hyperplastic polyp repeat in 2008.       Family History:    Family History   Problem Relation Age of Onset     Heart Disease Mother         Tachycardia     Osteoporosis Mother      Diabetes Father      Cancer Father         skin, lymph and colon CA     Arthritis Brother      Anesthesia Reaction No family hx of        Social History:  Marital Status:   [2]  Social History     Tobacco Use     Smoking status: Never Smoker     Smokeless tobacco: Never Used   Substance Use Topics     Alcohol use: Yes     Comment: occasional     Drug use: No        Medications:    sulfamethoxazole-trimethoprim (BACTRIM DS) 800-160 MG tablet  ASPIRIN PO  atorvastatin (LIPITOR) 20 MG tablet  lisinopril (ZESTRIL) 5 MG tablet  MELATONIN PO  metoprolol succinate ER (TOPROL-XL) 25 MG 24 hr tablet  Probiotic Product (PROBIOTIC DAILY PO)          Review of Systems   All other systems  reviewed and are negative.      Physical Exam   BP: 133/80  Pulse: 56  Temp: 98.8  F (37.1  C)  Resp: 17  Weight: 99.5 kg (219 lb 6.4 oz)  SpO2: 98 %      Physical Exam  Vitals and nursing note reviewed.   Constitutional:       General: He is not in acute distress.     Appearance: Normal appearance. He is not ill-appearing, toxic-appearing or diaphoretic.   HENT:      Head: Normocephalic and atraumatic.      Nose: Nose normal.   Eyes:      Extraocular Movements: Extraocular movements intact.      Conjunctiva/sclera: Conjunctivae normal.   Cardiovascular:      Rate and Rhythm: Normal rate and regular rhythm.      Heart sounds: Normal heart sounds.   Pulmonary:      Effort: Pulmonary effort is normal. No respiratory distress.      Breath sounds: Normal breath sounds.   Abdominal:      General: Abdomen is flat. There is no distension.      Tenderness: There is no abdominal tenderness. There is no right CVA tenderness or left CVA tenderness.   Musculoskeletal:         General: No deformity.      Cervical back: Neck supple.   Skin:     General: Skin is warm and dry.   Neurological:      General: No focal deficit present.      Mental Status: He is alert and oriented to person, place, and time. Mental status is at baseline.   Psychiatric:         Mood and Affect: Mood normal.         Behavior: Behavior normal.         ED Course        Procedures      Results for orders placed or performed during the hospital encounter of 07/30/22 (from the past 24 hour(s))   UA with Microscopic reflex to Culture    Specimen: Urine, Midstream   Result Value Ref Range    Color Urine Yellow Colorless, Straw, Light Yellow, Yellow    Appearance Urine Clear Clear    Glucose Urine Negative Negative mg/dL    Bilirubin Urine Negative Negative    Ketones Urine Negative Negative mg/dL    Specific Gravity Urine >=1.030 1.003 - 1.035    Blood Urine Large (A) Negative    pH Urine 6.0 5.0 - 7.0    Protein Albumin Urine 100  (A) Negative mg/dL     Urobilinogen Urine Normal Normal, 2.0 mg/dL    Nitrite Urine Positive (A) Negative    Leukocyte Esterase Urine Trace (A) Negative    Bacteria Urine Moderate (A) None Seen /HPF    WBC Clumps Urine Present (A) None Seen /HPF    Mucus Urine Present (A) None Seen /LPF    RBC Urine >182 (H) <=2 /HPF    WBC Urine >182 (H) <=5 /HPF    Squamous Epithelials Urine 4 (H) <=1 /HPF    Narrative    Urine Culture ordered based on laboratory criteria       Medications - No data to display      Assessments & Plan (with Medical Decision Making)  Dav Seay is a 72 year old male who presented to the ED complaining of urinary frequency and burning that started in the last 24 hours.  No associated fevers, abdominal pain, nausea or vomiting.  He had normal vitals on arrival and a completely normal exam.  Urinalysis was collected which showed positive nitrites, trace leukocyte esterase, white blood cell clumps with red blood cells and white blood cells present, consistent with urinary tract infection.  He had no fevers or rectal pain to suggest prostatitis, low risk for STIs at this time.  I discussed the results with the patient.  He was agreeable to treating this with a course of Bactrim for 1 week.  He was encouraged to drink lots of fluids as well.  He was provided instructions on when to return to the ED.  All questions answered and patient discharged home in suitable condition.     I have reviewed the nursing notes.    I have reviewed the findings, diagnosis, plan and need for follow up with the patient.    Discharge Medication List as of 7/30/2022  1:36 PM      START taking these medications    Details   sulfamethoxazole-trimethoprim (BACTRIM DS) 800-160 MG tablet Take 1 tablet by mouth 2 times daily for 7 days, Disp-14 tablet, R-0, E-Prescribe             Final diagnoses:   Acute cystitis with hematuria     Note: Chart documentation done in part with Dragon Voice Recognition software. Although reviewed after completion, some  word and grammatical errors may remain.     7/30/2022   [facility] EMERGENCY DEPT     Carissa Leahy PA-C  07/30/22 [id number]

## 2022-07-30 NOTE — DISCHARGE INSTRUCTIONS
Please take the full course of the antibiotic as prescribed for treatment of this urinary tract infection.  Be sure you are drinking lots of fluids.  If you develop any worsening symptoms please do not hesitate to return to the emergency department.    Thank you for choosing [facility] Emergency [facility]. It was a pleasure taking care of you today. If you have any questions, please call [phone].    Carissa Leahy PA-C

## 2022-08-01 LAB — BACTERIA UR CULT: ABNORMAL

## 2022-08-01 NOTE — RESULT ENCOUNTER NOTE
Final Urine Culture Report on 8/1/22  [facility] Emergency Dept discharge antibiotic prescribed:  Sulfamethoxazole-Trimethoprim (Bactrim DS, Septra DS) 800-160 mg PO tablet,  1 tablet by mouth 2 times daily for 7 days.  #1. Bacteria, >100,000 CFU/ML Citrobacter koseri, is SUSCEPTIBLE to Antibiotic.    No change in treatment per [facility] ED lab result Urine Culture protocol.

## 2022-08-04 ENCOUNTER — OFFICE VISIT (OUTPATIENT)
Dept: FAMILY MEDICINE | Facility: CLINIC | Age: 72
End: 2022-08-04
Payer: COMMERCIAL

## 2022-08-04 VITALS
TEMPERATURE: 97.2 F | HEART RATE: 57 BPM | OXYGEN SATURATION: 97 % | SYSTOLIC BLOOD PRESSURE: 128 MMHG | RESPIRATION RATE: 18 BRPM | WEIGHT: 212.44 LBS | DIASTOLIC BLOOD PRESSURE: 80 MMHG | BODY MASS INDEX: 29.22 KG/M2

## 2022-08-04 DIAGNOSIS — K40.91 UNILATERAL RECURRENT INGUINAL HERNIA WITHOUT OBSTRUCTION OR GANGRENE: Primary | ICD-10-CM

## 2022-08-04 PROCEDURE — 99213 OFFICE O/P EST LOW 20 MIN: CPT | Performed by: STUDENT IN AN ORGANIZED HEALTH CARE EDUCATION/TRAINING PROGRAM

## 2022-08-04 RX ORDER — LISINOPRIL 5 MG/1
1 TABLET ORAL DAILY
COMMUNITY
Start: 2020-12-15 | End: 2022-08-04

## 2022-08-04 RX ORDER — ATORVASTATIN CALCIUM 20 MG/1
20 TABLET, FILM COATED ORAL
COMMUNITY
End: 2022-08-04

## 2022-08-04 ASSESSMENT — PAIN SCALES - GENERAL: PAINLEVEL: MILD PAIN (2)

## 2022-08-04 NOTE — PROGRESS NOTES
"  Assessment & Plan     Unilateral recurrent inguinal hernia without obstruction or gangrene  Appears to have recurrence of left inguinal hernia.  Largely asymptomatic and we discussed treatment options including observation.  Patient will follow-up with general surgery to discuss things further.  - Adult General Surg Referral         BMI:   Estimated body mass index is 29.22 kg/m  as calculated from the following:    Height as of 10/25/21: 1.816 m (5' 11.5\").    Weight as of this encounter: 96.4 kg (212 lb 7 oz).     Nelson Diaz MD  [facility] is a 72 year old presenting for the following health issues:  Hernia      History of Present Illness       Reason for visit:  Discomfort in left groin area  Symptom onset:  3-4 weeks ago  Symptoms include:  Discomfort some swelling left groin area  Symptom intensity:  Moderate  Symptom progression:  Staying the same  Had these symptoms before:  No  What makes it worse:  Heavy lifting  What makes it better:  Rest or sleep    He eats 2-3 servings of fruits and vegetables daily.He consumes 0 sweetened beverage(s) daily.He exercises with enough effort to increase his heart rate 30 to 60 minutes per day.  He exercises with enough effort to increase his heart rate 5 days per week.   He is taking medications regularly.     Patient here with bulging in his right groin that he is noted the last several months.  Slight discomfort at times but for the most part does not bother him.  Seem to go away when he lays down at night.  Was diagnosed with UTI most recently and treated without symptoms now.  No other nausea or vomiting.  No other abdominal pain.  Normal stools.  Does have atrial fibrillation chronic kidney disease.   Upon review it does sound like he had a bilateral inguinal hernia repair and umbilical hernia repair back in 2006.  He has not had any issues since.    Review of Systems   Constitutional, HEENT, cardiovascular, " pulmonary, gi and gu systems are negative, except as otherwise noted.      Objective    /80   Pulse 57   Temp 97.2  F (36.2  C) (Temporal)   Resp 18   Wt 96.4 kg (212 lb 7 oz)   SpO2 97%   BMI 29.22 kg/m    Body mass index is 29.22 kg/m .  Physical Exam   GENERAL: healthy, alert and no distress  EYES: Eyes grossly normal to inspection, PERRL and conjunctivae and sclerae normal  RESP: Breathing comfortably room air  CV:no peripheral edema  ABDOMEN: soft, nontender, no hepatosplenomegaly, diastases recti with possible hernia  Urogenital: Normal external anatomy without lesions or rashes, inguinal hernia palpable on the left and reducible  MS: no gross musculoskeletal defects noted, no edema  SKIN: no suspicious lesions or rashes  NEURO: No focal deficits, mentation intact and speech normal  PSYCH: mentation appears normal, affect normal/bright            .  ..

## 2022-08-10 ENCOUNTER — OFFICE VISIT (OUTPATIENT)
Dept: SURGERY | Facility: CLINIC | Age: 72
End: 2022-08-10
Attending: STUDENT IN AN ORGANIZED HEALTH CARE EDUCATION/TRAINING PROGRAM
Payer: COMMERCIAL

## 2022-08-10 ENCOUNTER — TELEPHONE (OUTPATIENT)
Dept: SURGERY | Facility: CLINIC | Age: 72
End: 2022-08-10

## 2022-08-10 VITALS
HEIGHT: 72 IN | BODY MASS INDEX: 29.39 KG/M2 | TEMPERATURE: 97.3 F | DIASTOLIC BLOOD PRESSURE: 80 MMHG | SYSTOLIC BLOOD PRESSURE: 132 MMHG | WEIGHT: 217 LBS

## 2022-08-10 DIAGNOSIS — K40.91 RECURRENT LEFT INGUINAL HERNIA: Primary | ICD-10-CM

## 2022-08-10 DIAGNOSIS — K40.91 UNILATERAL RECURRENT INGUINAL HERNIA WITHOUT OBSTRUCTION OR GANGRENE: ICD-10-CM

## 2022-08-10 PROCEDURE — 99204 OFFICE O/P NEW MOD 45 MIN: CPT | Performed by: SURGERY

## 2022-08-10 NOTE — LETTER
8/10/2022         RE: Dav Seay  [address]  [facility] [address]        Dear Colleague,    Thank you for referring your patient, Dav Seay, to the [facility]. Please see a copy of my visit note below.    Patient seen in consultation for recurrent left inguinal hernia by Ryan Perera    HPI:  Patient is a 72 year old male with about 2-month history of recurrent bulge in the left groin.  He had bilateral laparoscopic inguinal hernia and umbilical hernia performed over 10 years ago.  He denies any obstructive symptoms.  No skin changes.  No significant debilitating pain.  Occasional discomfort.  Weight since hernia repair is relatively stable may be up 15 pounds.  He is a non-smoker and nondiabetic.    Review Of Systems    Skin: negative  Ears/Nose/Throat: negative  Respiratory: No shortness of breath, dyspnea on exertion, cough, or hemoptysis  Cardiovascular: negative  Gastrointestinal: negative  Genitourinary: negative  Musculoskeletal: negative  Neurologic: negative  Hematologic/Lymphatic/Immunologic: negative  Endocrine: negative      Past Medical History:   Diagnosis Date     Hypertension      Traumatic amputation of other finger(s) (complete) (partial), without mention of complication 1975    Amputated RIght 2nd finger       Past Surgical History:   Procedure Laterality Date     COLONOSCOPY  6/10/2013    Procedure: COLONOSCOPY;  Colonoscopy;  Surgeon: Wes Pablo MD;  Location:  GI     COLONOSCOPY N/A 5/20/2019    Procedure: COLONOSCOPY;  Surgeon: Jesus Harris MD;  Location:  GI     HC REMOVAL OF TONSILS,<13 Y/O  5 years old    Tonsils <14y.o.     HERNIA REPAIR, INGUINAL RT/LT       JOINT REPLACEMENT, HIP RT/LT  12/19/11    Left total hip arthroplasty. [facility]     JOINT REPLACEMENT, HIP RT/LT  12/18/2017    Right tota hip arthroplasty. [facility]     RELEASE CARPAL TUNNEL Left 10/18/2017    Procedure: RELEASE CARPAL TUNNEL;   Left Carpal Tunnel Release;  Surgeon: Norris Townsend MD;  Location: [facility] OR     ZZ COLONOSCOPY THRU STOMA W BIOPSY/CAUTERY TUMOR/POLYP/LESION  2003    hyperplastic polyp repeat in 2008.       Family History   Problem Relation Age of Onset     Heart Disease Mother         Tachycardia     Osteoporosis Mother      Diabetes Father      Cancer Father         skin, lymph and colon CA     Arthritis Brother      Anesthesia Reaction No family hx of        Social History     Socioeconomic History     Marital status:      Spouse name: Lliy     Number of children: 3     Years of education: Not on file     Highest education level: Not on file   Occupational History     Occupation: contractor   Tobacco Use     Smoking status: Never Smoker     Smokeless tobacco: Never Used   Vaping Use     Vaping Use: Never used   Substance and Sexual Activity     Alcohol use: Yes     Comment: occasional     Drug use: No     Sexual activity: Yes     Partners: Female   Other Topics Concern      Service Not Asked     Blood Transfusions Not Asked     Caffeine Concern Not Asked     Occupational Exposure Not Asked     Hobby Hazards Not Asked     Sleep Concern Not Asked     Stress Concern Not Asked     Weight Concern Not Asked     Special Diet Not Asked     Back Care Not Asked     Exercise Not Asked     Bike Helmet Not Asked     Seat Belt Not Asked     Self-Exams Not Asked     Parent/sibling w/ CABG, MI or angioplasty before 65F 55M? Not Asked   Social History Narrative     Not on file     Social Determinants of Health     Financial Resource Strain: Not on file   Food Insecurity: Not on file   Transportation Needs: Not on file   Physical Activity: Not on file   Stress: Not on file   Social Connections: Not on file   Intimate Partner Violence: Not on file   Housing Stability: Not on file       Current Outpatient Medications   Medication Sig Dispense Refill     aspirin (ASA) 81 MG EC tablet Take 81 mg by mouth       atorvastatin  "(LIPITOR) 20 MG tablet Take 1 tablet (20 mg) by mouth daily 90 tablet 4     lisinopril (ZESTRIL) 5 MG tablet Take 1 tablet (5 mg) by mouth daily 90 tablet 4     melatonin 1 MG/ML LIQD liquid Take 5 mg by mouth       metoprolol succinate ER (TOPROL-XL) 25 MG 24 hr tablet Take 1 tablet (25 mg) by mouth daily 90 tablet 4     Probiotic Product (PROBIOTIC DAILY PO)          Medications and history reviewed    Physical exam:  Vitals: /80   Temp 97.3  F (36.3  C) (Temporal)   Ht 1.816 m (5' 11.5\")   Wt 98.4 kg (217 lb)   BMI 29.84 kg/m    BMI= Body mass index is 29.84 kg/m .    Constitutional: Healthy, alert, non-distressed   Head: Normo-cephalic, atraumatic, no lesions, masses or tenderness   Cardiovascular: RRR, no new murmurs, +S1, +S2 heart sounds, no clicks, rubs or gallops   Respiratory: CTAB, no rales, rhonchi or wheezing, equal chest rise, good respiratory effort   Gastrointestinal: Soft, non-tender, non distended, no rebound rigidity or guarding, no masses or hernias palpated   : Deferred  Groin: Reducible soft inguinal hernia on the left.  No obvious hernia palpated on the right with Valsalva  Musculoskeletal: Moves all extremities, normal  strength, no deformities noted   Skin: No suspicious lesions or rashes   Psychiatric: Mentation appears normal, affect appropriate   Hematologic/Lymphatic/Immunologic: Normal cervical and supraclavicular lymph nodes   Patient able to get up on table without difficulty.    Labs show:  No results found for this or any previous visit (from the past 24 hour(s)).    Imaging shows:  No results found for this or any previous visit (from the past 744 hour(s)).     Assessment:     ICD-10-CM    1. Recurrent left inguinal hernia  K40.91    2. Unilateral recurrent inguinal hernia without obstruction or gangrene  K40.91 Adult General Surg Referral     Plan: I recommend open left inguinal hernia repair with mesh for his increasingly symptomatic left inguinal hernia.  At this " time he would like to observe due to the minimal nature of his symptoms however should they progress he would consider surgical repair potentially this fall or winter.     Risks of surgery discussed including, but not limited to bleeding, infection, recurrence, damage to nerves and what is in the hernia sac.  Risks of anesthesia also discussed..  Although mesh is a better long term repair if it gets infected it must be removed.  If there is evidence of an infection at time of surgery it will be cancelled and rescheduled for when infection has resolved.     Discussed massaging hernia back in and using ice if becomes more painful.  If not able to reduce then go to emergency room.    He is going to call in in the coming months when he is ready to schedule surgery    45 minutes spent on the date of the encounter doing chart review, history and exam, documentation and further activities per the note    Oleksandr Parkinson, DO        Again, thank you for allowing me to participate in the care of your patient.        Sincerely,        Oleksandr Parkinson, DO

## 2022-08-10 NOTE — TELEPHONE ENCOUNTER
Per Dr. Parkinson  Patient is choosing observation at this time so no need to call him to schedule surgery.  He will call in when/if ready to have the procedure

## 2022-08-10 NOTE — PROGRESS NOTES
Patient seen in consultation for recurrent left inguinal hernia by Ryan Perera    HPI:  Patient is a 72 year old male with about 2-month history of recurrent bulge in the left groin.  He had bilateral laparoscopic inguinal hernia and umbilical hernia performed over 10 years ago.  He denies any obstructive symptoms.  No skin changes.  No significant debilitating pain.  Occasional discomfort.  Weight since hernia repair is relatively stable may be up 15 pounds.  He is a non-smoker and nondiabetic.    Review Of Systems    Skin: negative  Ears/Nose/Throat: negative  Respiratory: No shortness of breath, dyspnea on exertion, cough, or hemoptysis  Cardiovascular: negative  Gastrointestinal: negative  Genitourinary: negative  Musculoskeletal: negative  Neurologic: negative  Hematologic/Lymphatic/Immunologic: negative  Endocrine: negative      Past Medical History:   Diagnosis Date     Hypertension      Traumatic amputation of other finger(s) (complete) (partial), without mention of complication 1975    Amputated RIght 2nd finger       Past Surgical History:   Procedure Laterality Date     COLONOSCOPY  6/10/2013    Procedure: COLONOSCOPY;  Colonoscopy;  Surgeon: Wes Pablo MD;  Location:  GI     COLONOSCOPY N/A 5/20/2019    Procedure: COLONOSCOPY;  Surgeon: Jesus Harris MD;  Location:  GI     HC REMOVAL OF TONSILS,<11 Y/O  5 years old    Tonsils <14y.o.     HERNIA REPAIR, INGUINAL RT/LT       JOINT REPLACEMENT, HIP RT/LT  12/19/11    Left total hip arthroplasty. [facility]     JOINT REPLACEMENT, HIP RT/LT  12/18/2017    Right tota hip arthroplasty. [facility]     RELEASE CARPAL TUNNEL Left 10/18/2017    Procedure: RELEASE CARPAL TUNNEL;  Left Carpal Tunnel Release;  Surgeon: Norris Townsend MD;  Location:  OR     [facility] COLONOSCOPY THRU STOMA W BIOPSY/CAUTERY TUMOR/POLYP/LESION  2003    hyperplastic polyp repeat in 2008.       Family History   Problem Relation Age of Onset      Heart Disease Mother         Tachycardia     Osteoporosis Mother      Diabetes Father      Cancer Father         skin, lymph and colon CA     Arthritis Brother      Anesthesia Reaction No family hx of        Social History     Socioeconomic History     Marital status:      Spouse name: Lily     Number of children: 3     Years of education: Not on file     Highest education level: Not on file   Occupational History     Occupation: contractor   Tobacco Use     Smoking status: Never Smoker     Smokeless tobacco: Never Used   Vaping Use     Vaping Use: Never used   Substance and Sexual Activity     Alcohol use: Yes     Comment: occasional     Drug use: No     Sexual activity: Yes     Partners: Female   Other Topics Concern      Service Not Asked     Blood Transfusions Not Asked     Caffeine Concern Not Asked     Occupational Exposure Not Asked     Hobby Hazards Not Asked     Sleep Concern Not Asked     Stress Concern Not Asked     Weight Concern Not Asked     Special Diet Not Asked     Back Care Not Asked     Exercise Not Asked     Bike Helmet Not Asked     Seat Belt Not Asked     Self-Exams Not Asked     Parent/sibling w/ CABG, MI or angioplasty before 65F 55M? Not Asked   Social History Narrative     Not on file     Social Determinants of Health     Financial Resource Strain: Not on file   Food Insecurity: Not on file   Transportation Needs: Not on file   Physical Activity: Not on file   Stress: Not on file   Social Connections: Not on file   Intimate Partner Violence: Not on file   Housing Stability: Not on file       Current Outpatient Medications   Medication Sig Dispense Refill     aspirin (ASA) 81 MG EC tablet Take 81 mg by mouth       atorvastatin (LIPITOR) 20 MG tablet Take 1 tablet (20 mg) by mouth daily 90 tablet 4     lisinopril (ZESTRIL) 5 MG tablet Take 1 tablet (5 mg) by mouth daily 90 tablet 4     melatonin 1 MG/ML LIQD liquid Take 5 mg by mouth       metoprolol succinate ER (TOPROL-XL)  "25 MG 24 hr tablet Take 1 tablet (25 mg) by mouth daily 90 tablet 4     Probiotic Product (PROBIOTIC DAILY PO)          Medications and history reviewed    Physical exam:  Vitals: /80   Temp 97.3  F (36.3  C) (Temporal)   Ht 1.816 m (5' 11.5\")   Wt 98.4 kg (217 lb)   BMI 29.84 kg/m    BMI= Body mass index is 29.84 kg/m .    Constitutional: Healthy, alert, non-distressed   Head: Normo-cephalic, atraumatic, no lesions, masses or tenderness   Cardiovascular: RRR, no new murmurs, +S1, +S2 heart sounds, no clicks, rubs or gallops   Respiratory: CTAB, no rales, rhonchi or wheezing, equal chest rise, good respiratory effort   Gastrointestinal: Soft, non-tender, non distended, no rebound rigidity or guarding, no masses or hernias palpated   : Deferred  Groin: Reducible soft inguinal hernia on the left.  No obvious hernia palpated on the right with Valsalva  Musculoskeletal: Moves all extremities, normal  strength, no deformities noted   Skin: No suspicious lesions or rashes   Psychiatric: Mentation appears normal, affect appropriate   Hematologic/Lymphatic/Immunologic: Normal cervical and supraclavicular lymph nodes   Patient able to get up on table without difficulty.    Labs show:  No results found for this or any previous visit (from the past 24 hour(s)).    Imaging shows:  No results found for this or any previous visit (from the past 744 hour(s)).     Assessment:     ICD-10-CM    1. Recurrent left inguinal hernia  K40.91    2. Unilateral recurrent inguinal hernia without obstruction or gangrene  K40.91 Adult General Surg Referral     Plan: I recommend open left inguinal hernia repair with mesh for his increasingly symptomatic left inguinal hernia.  At this time he would like to observe due to the minimal nature of his symptoms however should they progress he would consider surgical repair potentially this fall or winter.     Risks of surgery discussed including, but not limited to bleeding, infection, " recurrence, damage to nerves and what is in the hernia sac.  Risks of anesthesia also discussed..  Although mesh is a better long term repair if it gets infected it must be removed.  If there is evidence of an infection at time of surgery it will be cancelled and rescheduled for when infection has resolved.     Discussed massaging hernia back in and using ice if becomes more painful.  If not able to reduce then go to emergency room.    He is going to call in in the coming months when he is ready to schedule surgery    45 minutes spent on the date of the encounter doing chart review, history and exam, documentation and further activities per the note    Oleksandr Parkinson, DO

## 2022-08-18 ENCOUNTER — HOSPITAL ENCOUNTER (EMERGENCY)
Facility: CLINIC | Age: 72
Discharge: HOME | End: 2022-08-18
Attending: FAMILY MEDICINE | Admitting: FAMILY MEDICINE
Payer: MEDICARE

## 2022-08-18 VITALS
SYSTOLIC BLOOD PRESSURE: 134 MMHG | RESPIRATION RATE: 16 BRPM | BODY MASS INDEX: 30.34 KG/M2 | HEART RATE: 59 BPM | TEMPERATURE: 98 F | OXYGEN SATURATION: 97 % | WEIGHT: 220.6 LBS | DIASTOLIC BLOOD PRESSURE: 71 MMHG

## 2022-08-18 DIAGNOSIS — N39.0 URINARY TRACT INFECTION WITHOUT HEMATURIA, SITE UNSPECIFIED: ICD-10-CM

## 2022-08-18 LAB
ALBUMIN UR-MCNC: 100 MG/DL
APPEARANCE UR: ABNORMAL
BACTERIA #/AREA URNS HPF: ABNORMAL /HPF
BILIRUB UR QL STRIP: ABNORMAL
COLOR UR AUTO: ABNORMAL
GLUCOSE UR STRIP-MCNC: 100 MG/DL
HGB UR QL STRIP: ABNORMAL
KETONES UR STRIP-MCNC: NEGATIVE MG/DL
LEUKOCYTE ESTERASE UR QL STRIP: ABNORMAL
MUCOUS THREADS #/AREA URNS LPF: PRESENT /LPF
NITRATE UR QL: POSITIVE
PH UR STRIP: 6.5 [PH] (ref 5–7)
RBC URINE: >182 /HPF
SP GR UR STRIP: 1.02 (ref 1–1.03)
SQUAMOUS EPITHELIAL: 2 /HPF
UROBILINOGEN UR STRIP-MCNC: NORMAL MG/DL
WBC CLUMPS #/AREA URNS HPF: PRESENT /HPF
WBC URINE: >182 /HPF
YEAST #/AREA URNS HPF: ABNORMAL /HPF

## 2022-08-18 PROCEDURE — 81001 URINALYSIS AUTO W/SCOPE: CPT | Performed by: FAMILY MEDICINE

## 2022-08-18 PROCEDURE — 87086 URINE CULTURE/COLONY COUNT: CPT | Performed by: FAMILY MEDICINE

## 2022-08-18 PROCEDURE — 99284 EMERGENCY DEPT VISIT MOD MDM: CPT | Performed by: FAMILY MEDICINE

## 2022-08-18 PROCEDURE — 99283 EMERGENCY DEPT VISIT LOW MDM: CPT | Performed by: FAMILY MEDICINE

## 2022-08-18 RX ORDER — CIPROFLOXACIN 500 MG/1
500 TABLET, FILM COATED ORAL 2 TIMES DAILY
Qty: 20 TABLET | Refills: 0 | Status: SHIPPED | OUTPATIENT
Start: 2022-08-18 | End: 2022-08-28

## 2022-08-18 NOTE — ED TRIAGE NOTES
Pt was treated for a UTI a couple of weeks ago and he thinks he has one again, he is having frequency, burning and blood in his urine, no back pain and no nausea

## 2022-08-18 NOTE — ED PROVIDER NOTES
History     Chief Complaint   Patient presents with     Rule out Urinary Tract Infection     HPI  Dav Seay is a 72 year old male who presents with concerns of a recurrent UTI.  Patient was seen here 18 days ago for the same problem and treated with a course of Bactrim.  Patient had gotten better but in the last 24 hours started having the symptoms again.  Denies any fevers or chills.  Denies any back pain.    Allergies:  Allergies   Allergen Reactions     No Known Drug Allergies        Problem List:    Patient Active Problem List    Diagnosis Date Noted     Atrial fibrillation with RVR (H) 10/25/2021     Priority: Medium     CKD (chronic kidney disease) stage 3, GFR 30-59 ml/min (H) 01/08/2018     Priority: Medium     Carpal tunnel syndrome of left wrist 07/25/2017     Priority: Medium     Carpal tunnel syndrome of right wrist 07/25/2017     Priority: Medium     Insomnia, transient 12/02/2013     Priority: Medium     Advance Care Planning 11/30/2012     Priority: Medium     Advance Care Planning 9/16/2015: Receipt of ACP document:  Received: Health Care Directive which was witnessed or notarized on 8/29/15.  Document not previously scanned.  Validation form completed and sent with document to be scanned.  Code Status needs to be updated to reflect choices in most recent ACP document. Confirmed/documented designated decision maker(s).  Added by Tara Davis RN, BSN, MA, Advance Care Planning Liaison.  Advance Care Planning 11/30/12: Patient states has Advance Directive and will bring in a copy to clinic. 11/30/2012  Henri/FLOR          Hypertension goal BP (blood pressure) < 140/90 02/15/2012     Priority: Medium     CARDIOVASCULAR SCREENING; LDL GOAL LESS THAN 160 10/31/2010     Priority: Medium        Past Medical History:    Past Medical History:   Diagnosis Date     Hypertension      Traumatic amputation of other finger(s) (complete) (partial), without mention of complication 1975       Past  Surgical History:    Past Surgical History:   Procedure Laterality Date     COLONOSCOPY  6/10/2013    Procedure: COLONOSCOPY;  Colonoscopy;  Surgeon: Wes Pablo MD;  Location: [facility] GI     COLONOSCOPY N/A 5/20/2019    Procedure: COLONOSCOPY;  Surgeon: Jesus Harris MD;  Location:  GI     HC REMOVAL OF TONSILS,<15 Y/O  5 years old    Tonsils <14y.o.     HERNIA REPAIR, INGUINAL RT/LT       JOINT REPLACEMENT, HIP RT/LT  12/19/11    Left total hip arthroplasty. [facility]     JOINT REPLACEMENT, HIP RT/LT  12/18/2017    Right tota hip arthroplasty. [facility]     RELEASE CARPAL TUNNEL Left 10/18/2017    Procedure: RELEASE CARPAL TUNNEL;  Left Carpal Tunnel Release;  Surgeon: Norris Townsend MD;  Location:  OR     [facility] COLONOSCOPY THRU STOMA W BIOPSY/CAUTERY TUMOR/POLYP/LESION  2003    hyperplastic polyp repeat in 2008.       Family History:    Family History   Problem Relation Age of Onset     Heart Disease Mother         Tachycardia     Osteoporosis Mother      Diabetes Father      Cancer Father         skin, lymph and colon CA     Arthritis Brother      Anesthesia Reaction No family hx of        Social History:  Marital Status:   [2]  Social History     Tobacco Use     Smoking status: Never Smoker     Smokeless tobacco: Never Used   Vaping Use     Vaping Use: Never used   Substance Use Topics     Alcohol use: Yes     Comment: occasional     Drug use: No        Medications:    ciprofloxacin (CIPRO) 500 MG tablet  aspirin (ASA) 81 MG EC tablet  atorvastatin (LIPITOR) 20 MG tablet  lisinopril (ZESTRIL) 5 MG tablet  melatonin 1 MG/ML LIQD liquid  metoprolol succinate ER (TOPROL-XL) 25 MG 24 hr tablet  Probiotic Product (PROBIOTIC DAILY PO)          Review of Systems   All other systems reviewed and are negative.      Physical Exam   BP: 134/71  Pulse: 59  Temp: 98  F (36.7  C)  Resp: 16  Weight: 100.1 kg (220 lb 9.6 oz)  SpO2: 97 %      Physical Exam  Vitals and nursing note  reviewed.   Constitutional:       General: He is not in acute distress.     Appearance: Normal appearance. He is not ill-appearing.   Musculoskeletal:         General: Normal range of motion.   Skin:     General: Skin is warm and dry.      Capillary Refill: Capillary refill takes less than 2 seconds.      Findings: No rash.   Neurological:      Mental Status: He is alert.         ED Course                 Procedures                Results for orders placed or performed during the hospital encounter of 08/18/22 (from the past 24 hour(s))   UA with Microscopic reflex to Culture    Specimen: Urine, Midstream   Result Value Ref Range    Color Urine Brown (A) Colorless, Straw, Light Yellow, Yellow    Appearance Urine Cloudy (A) Clear    Glucose Urine 100  (A) Negative mg/dL    Bilirubin Urine Small (A) Negative    Ketones Urine Negative Negative mg/dL    Specific Gravity Urine 1.025 1.003 - 1.035    Blood Urine Large (A) Negative    pH Urine 6.5 5.0 - 7.0    Protein Albumin Urine 100  (A) Negative mg/dL    Urobilinogen Urine Normal Normal, 2.0 mg/dL    Nitrite Urine Positive (A) Negative    Leukocyte Esterase Urine Moderate (A) Negative    Bacteria Urine Few (A) None Seen /HPF    WBC Clumps Urine Present (A) None Seen /HPF    Budding Yeast Urine Few (A) None Seen /HPF    Mucus Urine Present (A) None Seen /LPF    RBC Urine >182 (H) <=2 /HPF    WBC Urine >182 (H) <=5 /HPF    Squamous Epithelials Urine 2 (H) <=1 /HPF    Narrative    Urine Culture ordered based on laboratory criteria       Medications - No data to display     Urine shows findings of a UTI again.  Based on his symptoms and vitals I do not see any complications.  He was treated with Bactrim last time so Doris do a 10-day course of Cipro this time.  Urine culture last time did show it was sensitive to the Bactrim.  We will do another culture though.  Patient will be discharged at this time, he was told to follow-up with his doctor if things or not improving and  to see his doctor in 3 weeks for repeat urine culture.    Assessments & Plan (with Medical Decision Making)  UTI     I have reviewed the nursing notes.    I have reviewed the findings, diagnosis, plan and need for follow up with the patient.      New Prescriptions    CIPROFLOXACIN (CIPRO) 500 MG TABLET    Take 1 tablet (500 mg) by mouth 2 times daily for 10 days       Final diagnoses:   Urinary tract infection without hematuria, site unspecified       8/18/2022   [facility] EMERGENCY DEPT     Graham Will MD  08/18/22 0821

## 2022-08-19 LAB — BACTERIA UR CULT: ABNORMAL

## 2022-08-29 ENCOUNTER — VIRTUAL VISIT (OUTPATIENT)
Dept: FAMILY MEDICINE | Facility: CLINIC | Age: 72
End: 2022-08-29
Payer: COMMERCIAL

## 2022-08-29 DIAGNOSIS — Z87.440 PERSONAL HISTORY OF URINARY TRACT INFECTION: Primary | ICD-10-CM

## 2022-08-29 DIAGNOSIS — N18.31 STAGE 3A CHRONIC KIDNEY DISEASE (H): ICD-10-CM

## 2022-08-29 DIAGNOSIS — I48.91 ATRIAL FIBRILLATION WITH RVR (H): ICD-10-CM

## 2022-08-29 PROCEDURE — 99213 OFFICE O/P EST LOW 20 MIN: CPT | Mod: 95 | Performed by: FAMILY MEDICINE

## 2022-08-29 NOTE — PROGRESS NOTES
"Dav is a 72 year old who is being evaluated via a billable video visit.      How would you like to obtain your AVS? MyChart  If the video visit is dropped, the invitation should be resent by: Text to cell phone: [phone]  Will anyone else be joining your video visit? No          Assessment & Plan     Personal history of urinary tract infection  Chronic, treated with Cipro and resolved. The current medical regimen is effective;  continue present plan and medications. Encourage fluids.     Atrial fibrillation with RVR (H)  Chronic stable remains in NSR. Previously evaluation at [facility] remains of beta blocker without recurrence.    Stage 3a chronic kidney disease (H)  Chronic stable. The current medical regimen is effective;  continue present plan and medications. Avoid NSAIDS. Will plan for recheck renal function in the next 2 months.  - Albumin Random Urine Quantitative with Creat Ratio; Future  - Hemoglobin; Future    Ordering of each unique test  Prescription drug management         BMI:   Estimated body mass index is 30.34 kg/m  as calculated from the following:    Height as of 8/10/22: 1.816 m (5' 11.5\").    Weight as of 8/18/22: 100.1 kg (220 lb 9.6 oz).       Work on weight loss  Regular exercise    Return in about 2 months (around 10/29/2022) for Follow up, in person lab only..    Ryan Perera MD  [facility]    Joshua Demarco is a 72 year old, presenting for the following health issues:  UTI      UTI         ED/UC Followup:    Facility:  [facility]   Date of visit: 8/18/2022  Reason for visit: UTI   Current Status: No more symptoms       Chief Complaint   Patient presents with     Rule out Urinary Tract Infection      HPI  Dav Seay is a 72 year old male who presents with concerns of a recurrent UTI.  Patient was seen here 18 days ago for the same problem and treated with a course of Bactrim.  Patient had gotten better but in the last 24 hours " started having the symptoms again.  Denies any fevers or chills.  Denies any back pain.     Allergies:       Allergies   Allergen Reactions     No Known Drug Allergies           Problem List:          Patient Active Problem List     Diagnosis Date Noted     Atrial fibrillation with RVR (H) 10/25/2021       Priority: Medium     CKD (chronic kidney disease) stage 3, GFR 30-59 ml/min (H) 01/08/2018       Priority: Medium     Carpal tunnel syndrome of left wrist 07/25/2017       Priority: Medium     Carpal tunnel syndrome of right wrist 07/25/2017       Priority: Medium     Insomnia, transient 12/02/2013       Priority: Medium     Advance Care Planning 11/30/2012       Priority: Medium       Advance Care Planning 9/16/2015: Receipt of ACP document:  Received: Health Care Directive which was witnessed or notarized on 8/29/15.  Document not previously scanned.  Validation form completed and sent with document to be scanned.  Code Status needs to be updated to reflect choices in most recent ACP document. Confirmed/documented designated decision maker(s).  Added by Tara Davis RN, BSN, MA, Advance Care Planning Liaison.  Advance Care Planning 11/30/12: Patient states has Advance Directive and will bring in a copy to clinic. 11/30/2012  Henri/FLOR            Hypertension goal BP (blood pressure) < 140/90 02/15/2012       Priority: Medium     CARDIOVASCULAR SCREENING; LDL GOAL LESS THAN 160 10/31/2010       Priority: Medium         Past Medical History:         Past Medical History:   Diagnosis Date     Hypertension       Traumatic amputation of other finger(s) (complete) (partial), without mention of complication 1975         Past Surgical History:    Past Surgical History         Past Surgical History:   Procedure Laterality Date     COLONOSCOPY   6/10/2013     Procedure: COLONOSCOPY;  Colonoscopy;  Surgeon: Wes Pablo MD;  Location:  GI     COLONOSCOPY N/A 5/20/2019     Procedure: COLONOSCOPY;  Surgeon:  Jesus Harris MD;  Location:  GI     HC REMOVAL OF TONSILS,<15 Y/O   5 years old     Tonsils <14y.o.     HERNIA REPAIR, INGUINAL RT/LT         JOINT REPLACEMENT, HIP RT/LT   12/19/11     Left total hip arthroplasty. [facility]     JOINT REPLACEMENT, HIP RT/LT   12/18/2017     Right tota hip arthroplasty. [facility]     RELEASE CARPAL TUNNEL Left 10/18/2017     Procedure: RELEASE CARPAL TUNNEL;  Left Carpal Tunnel Release;  Surgeon: Norris Townsend MD;  Location:  OR     [facility] COLONOSCOPY THRU STOMA W BIOPSY/CAUTERY TUMOR/POLYP/LESION   2003     hyperplastic polyp repeat in 2008.            Family History:    Family History         Family History   Problem Relation Age of Onset     Heart Disease Mother           Tachycardia     Osteoporosis Mother       Diabetes Father       Cancer Father           skin, lymph and colon CA     Arthritis Brother       Anesthesia Reaction No family hx of              Social History:  Marital Status:   [2]  Social History            Tobacco Use     Smoking status: Never Smoker     Smokeless tobacco: Never Used   Vaping Use     Vaping Use: Never used   Substance Use Topics     Alcohol use: Yes       Comment: occasional     Drug use: No         Medications:    ciprofloxacin (CIPRO) 500 MG tablet  aspirin (ASA) 81 MG EC tablet  atorvastatin (LIPITOR) 20 MG tablet  lisinopril (ZESTRIL) 5 MG tablet  melatonin 1 MG/ML LIQD liquid  metoprolol succinate ER (TOPROL-XL) 25 MG 24 hr tablet  Probiotic Product (PROBIOTIC DAILY PO)              Review of Systems   All other systems reviewed and are negative.        Physical Exam   BP: 134/71  Pulse: 59  Temp: 98  F (36.7  C)  Resp: 16  Weight: 100.1 kg (220 lb 9.6 oz)  SpO2: 97 %        Physical Exam  Vitals and nursing note reviewed.   Constitutional:       General: He is not in acute distress.     Appearance: Normal appearance. He is not ill-appearing.   Musculoskeletal:         General: Normal range of motion.    Skin:     General: Skin is warm and dry.      Capillary Refill: Capillary refill takes less than 2 seconds.      Findings: No rash.   Neurological:      Mental Status: He is alert.          ED Course               Procedures                      Results for orders placed or performed during the hospital encounter of 08/18/22 (from the past 24 hour(s))   UA with Microscopic reflex to Culture     Specimen: Urine, Midstream   Result Value Ref Range     Color Urine Brown (A) Colorless, Straw, Light Yellow, Yellow     Appearance Urine Cloudy (A) Clear     Glucose Urine 100  (A) Negative mg/dL     Bilirubin Urine Small (A) Negative     Ketones Urine Negative Negative mg/dL     Specific Gravity Urine 1.025 1.003 - 1.035     Blood Urine Large (A) Negative     pH Urine 6.5 5.0 - 7.0     Protein Albumin Urine 100  (A) Negative mg/dL     Urobilinogen Urine Normal Normal, 2.0 mg/dL     Nitrite Urine Positive (A) Negative     Leukocyte Esterase Urine Moderate (A) Negative     Bacteria Urine Few (A) None Seen /HPF     WBC Clumps Urine Present (A) None Seen /HPF     Budding Yeast Urine Few (A) None Seen /HPF     Mucus Urine Present (A) None Seen /LPF     RBC Urine >182 (H) <=2 /HPF     WBC Urine >182 (H) <=5 /HPF     Squamous Epithelials Urine 2 (H) <=1 /HPF     Narrative     Urine Culture ordered based on laboratory criteria         Medications - No data to display      Urine shows findings of a UTI again.  Based on his symptoms and vitals I do not see any complications.  He was treated with Bactrim last time so Doris do a 10-day course of Cipro this time.  Urine culture last time did show it was sensitive to the Bactrim.  We will do another culture though.  Patient will be discharged at this time, he was told to follow-up with his doctor if things or not improving and to see his doctor in 3 weeks for repeat urine culture.     Assessments & Plan (with Medical Decision Making)  UTI      I have reviewed the nursing notes.     I  have reviewed the findings, diagnosis, plan and need for follow up with the patient.             New Prescriptions     CIPROFLOXACIN (CIPRO) 500 MG TABLET    Take 1 tablet (500 mg) by mouth 2 times daily for 10 days         Final diagnoses:   Urinary tract infection without hematuria, site unspecified         8/18/2022   [facility] EMERGENCY DEPT     Graham Will MD  08/18/22 0810    Patient Active Problem List   Diagnosis     CARDIOVASCULAR SCREENING; LDL GOAL LESS THAN 160     Hypertension goal BP (blood pressure) < 140/90     Advance Care Planning     Insomnia, transient     Carpal tunnel syndrome of left wrist     Carpal tunnel syndrome of right wrist     CKD (chronic kidney disease) stage 3, GFR 30-59 ml/min (H)     Atrial fibrillation with RVR (H)     Current Outpatient Medications   Medication Sig Dispense Refill     aspirin (ASA) 81 MG EC tablet Take 81 mg by mouth       atorvastatin (LIPITOR) 20 MG tablet Take 1 tablet (20 mg) by mouth daily 90 tablet 4     lisinopril (ZESTRIL) 5 MG tablet Take 1 tablet (5 mg) by mouth daily 90 tablet 4     melatonin 1 MG/ML LIQD liquid Take 5 mg by mouth       metoprolol succinate ER (TOPROL-XL) 25 MG 24 hr tablet Take 1 tablet (25 mg) by mouth daily 90 tablet 4     Probiotic Product (PROBIOTIC DAILY PO)        Health Maintenance Due   Topic Date Due     MICROALBUMIN  04/05/2020     HEMOGLOBIN  03/03/2021     COVID-19 Vaccine (4 - Booster for Pfizer series) 03/01/2022     INFLUENZA VACCINE (1) 09/01/2022         Review of Systems   CONSTITUTIONAL: NEGATIVE for fever, chills, change in weight  ENT/MOUTH: NEGATIVE for ear, mouth and throat problems  RESP: NEGATIVE for significant cough or SOB  CV: NEGATIVE for chest pain, palpitations or peripheral edema  : negative for dysuria, hematuria, decreased urinary stream, erectile dysfunction  ROS otherwise negative      Objective           Vitals:  No vitals were obtained today due to virtual  visit.    Physical Exam   GENERAL: Healthy, alert and no distress  EYES: Eyes grossly normal to inspection.  No discharge or erythema, or obvious scleral/conjunctival abnormalities.  RESP: No audible wheeze, cough, or visible cyanosis.  No visible retractions or increased work of breathing.    SKIN: Visible skin clear. No significant rash, abnormal pigmentation or lesions.  NEURO: Cranial nerves grossly intact.  Mentation and speech appropriate for age.  PSYCH: Mentation appears normal, affect normal/bright, judgement and insight intact, normal speech and appearance well-groomed.    Admission on 08/18/2022, Discharged on 08/18/2022   Component Date Value Ref Range Status     Color Urine 08/18/2022 Brown (A) Colorless, Straw, Light Yellow, Yellow Final     Appearance Urine 08/18/2022 Cloudy (A) Clear Final     Glucose Urine 08/18/2022 100  (A) Negative mg/dL Final     Bilirubin Urine 08/18/2022 Small (A) Negative Final     Ketones Urine 08/18/2022 Negative  Negative mg/dL Final     Specific Gravity Urine 08/18/2022 1.025  1.003 - 1.035 Final     Blood Urine 08/18/2022 Large (A) Negative Final     pH Urine 08/18/2022 6.5  5.0 - 7.0 Final     Protein Albumin Urine 08/18/2022 100  (A) Negative mg/dL Final     Urobilinogen Urine 08/18/2022 Normal  Normal, 2.0 mg/dL Final     Nitrite Urine 08/18/2022 Positive (A) Negative Final     Leukocyte Esterase Urine 08/18/2022 Moderate (A) Negative Final     Bacteria Urine 08/18/2022 Few (A) None Seen /HPF Final     WBC Clumps Urine 08/18/2022 Present (A) None Seen /HPF Final     Budding Yeast Urine 08/18/2022 Few (A) None Seen /HPF Final     Mucus Urine 08/18/2022 Present (A) None Seen /LPF Final     RBC Urine 08/18/2022 >182 (A) <=2 /HPF Final     WBC Urine 08/18/2022 >182 (A) <=5 /HPF Final     Squamous Epithelials Urine 08/18/2022 2 (A) <=1 /HPF Final     Culture 08/18/2022 >100,000 CFU/mL Citrobacter koseri (A)  Final               Video-Visit Details    Video Start Time: 1:52  PM    Type of service:  Video Visit    Video End Time:1:57 PM    Originating Location (pt. Location): Home    Distant Location (provider location):  [facility]     Platform used for Video Visit: [employer]    Olivier Worrell

## 2022-10-03 ENCOUNTER — APPOINTMENT (OUTPATIENT)
Dept: URBAN - METROPOLITAN AREA CLINIC 257 | Age: 72
Setting detail: DERMATOLOGY
End: 2022-10-03

## 2022-10-03 DIAGNOSIS — L57.0 ACTINIC KERATOSIS: ICD-10-CM

## 2022-10-03 DIAGNOSIS — D18.0 HEMANGIOMA: ICD-10-CM

## 2022-10-03 DIAGNOSIS — L81.4 OTHER MELANIN HYPERPIGMENTATION: ICD-10-CM

## 2022-10-03 DIAGNOSIS — Z71.89 OTHER SPECIFIED COUNSELING: ICD-10-CM

## 2022-10-03 DIAGNOSIS — L82.1 OTHER SEBORRHEIC KERATOSIS: ICD-10-CM

## 2022-10-03 DIAGNOSIS — L57.8 OTHER SKIN CHANGES DUE TO CHRONIC EXPOSURE TO NONIONIZING RADIATION: ICD-10-CM

## 2022-10-03 DIAGNOSIS — D22 MELANOCYTIC NEVI: ICD-10-CM

## 2022-10-03 PROBLEM — D22.5 MELANOCYTIC NEVI OF TRUNK: Status: ACTIVE | Noted: 2022-10-03

## 2022-10-03 PROBLEM — D18.01 HEMANGIOMA OF SKIN AND SUBCUTANEOUS TISSUE: Status: ACTIVE | Noted: 2022-10-03

## 2022-10-03 PROCEDURE — 17000 DESTRUCT PREMALG LESION: CPT

## 2022-10-03 PROCEDURE — OTHER MIPS QUALITY: OTHER

## 2022-10-03 PROCEDURE — OTHER LIQUID NITROGEN: OTHER

## 2022-10-03 PROCEDURE — 99213 OFFICE O/P EST LOW 20 MIN: CPT | Mod: 25

## 2022-10-03 PROCEDURE — OTHER COUNSELING: OTHER

## 2022-10-03 ASSESSMENT — LOCATION DETAILED DESCRIPTION DERM
LOCATION DETAILED: INFERIOR THORACIC SPINE
LOCATION DETAILED: RIGHT SUPERIOR MEDIAL UPPER BACK
LOCATION DETAILED: LEFT MEDIAL UPPER BACK
LOCATION DETAILED: LEFT DISTAL DORSAL FOREARM
LOCATION DETAILED: LEFT INFERIOR MEDIAL UPPER BACK

## 2022-10-03 ASSESSMENT — LOCATION SIMPLE DESCRIPTION DERM
LOCATION SIMPLE: UPPER BACK
LOCATION SIMPLE: RIGHT UPPER BACK
LOCATION SIMPLE: LEFT UPPER BACK
LOCATION SIMPLE: LEFT FOREARM

## 2022-10-03 ASSESSMENT — LOCATION ZONE DERM
LOCATION ZONE: ARM
LOCATION ZONE: TRUNK

## 2022-10-03 NOTE — PROCEDURE: LIQUID NITROGEN
Consent: The patient's consent was obtained including but not limited to risks of crusting, scabbing, blistering, scarring, darker or lighter pigmentary change, recurrence, incomplete removal and infection.
Render Post-Care Instructions In Note?: yes
Post-Care Instructions: I reviewed with the patient in detail post-care instructions. Patient is to wear sunprotection, and avoid picking at any of the treated lesions. Pt may apply Vaseline to crusted or scabbing areas.
Detail Level: Simple
Duration Of Freeze Thaw-Cycle (Seconds): 5
Number Of Freeze-Thaw Cycles: 1 freeze-thaw cycle

## 2022-10-03 NOTE — PROCEDURE: MIPS QUALITY
Quality 130: Documentation Of Current Medications In The Medical Record: Current Medications Documented
Quality 431: Preventive Care And Screening: Unhealthy Alcohol Use - Screening: Patient not identified as an unhealthy alcohol user when screened for unhealthy alcohol use using a systematic screening method
Quality 110: Preventive Care And Screening: Influenza Immunization: Influenza Immunization Ordered or Recommended, but not Administered due to system reason
Quality 226: Preventive Care And Screening: Tobacco Use: Screening And Cessation Intervention: Patient screened for tobacco use and is an ex/non-smoker
Detail Level: Generalized

## 2022-10-03 NOTE — HPI: FULL BODY SKIN EXAMINATION
What Type Of Note Output Would You Prefer (Optional)?: Standard Output
What Is The Reason For Today's Visit?: Full Body Skin Examination
What Is The Reason For Today's Visit? (Being Monitored For X): concerning skin lesions on an annual basis
Additional History: Patient has no concerns today, and is here for his fully body skin examination.

## 2022-10-09 ENCOUNTER — HEALTH MAINTENANCE LETTER (OUTPATIENT)
Age: 72
End: 2022-10-09

## 2022-11-02 ENCOUNTER — LAB (OUTPATIENT)
Dept: LAB | Facility: CLINIC | Age: 72
End: 2022-11-02
Payer: COMMERCIAL

## 2022-11-02 ENCOUNTER — IMMUNIZATION (OUTPATIENT)
Dept: FAMILY MEDICINE | Facility: CLINIC | Age: 72
End: 2022-11-02
Payer: COMMERCIAL

## 2022-11-02 DIAGNOSIS — N18.31 STAGE 3A CHRONIC KIDNEY DISEASE (H): ICD-10-CM

## 2022-11-02 LAB
CREAT UR-MCNC: 176 MG/DL
HGB BLD-MCNC: 15.9 G/DL (ref 13.3–17.7)
MICROALBUMIN UR-MCNC: 21 MG/L
MICROALBUMIN/CREAT UR: 11.93 MG/G CR (ref 0–17)

## 2022-11-02 PROCEDURE — 82043 UR ALBUMIN QUANTITATIVE: CPT

## 2022-11-02 PROCEDURE — 85018 HEMOGLOBIN: CPT

## 2022-11-02 PROCEDURE — 36415 COLL VENOUS BLD VENIPUNCTURE: CPT

## 2022-11-02 PROCEDURE — 0124A COVID-19,PF,PFIZER BOOSTER BIVALENT: CPT

## 2022-11-02 PROCEDURE — G0008 ADMIN INFLUENZA VIRUS VAC: HCPCS | Mod: 59

## 2022-11-02 PROCEDURE — 90662 IIV NO PRSV INCREASED AG IM: CPT

## 2022-11-02 PROCEDURE — 91312 COVID-19,PF,PFIZER BOOSTER BIVALENT: CPT

## 2022-11-26 ENCOUNTER — HEALTH MAINTENANCE LETTER (OUTPATIENT)
Age: 72
End: 2022-11-26

## 2022-12-06 DIAGNOSIS — E78.5 HYPERLIPIDEMIA LDL GOAL <100: ICD-10-CM

## 2022-12-06 DIAGNOSIS — N18.31 STAGE 3A CHRONIC KIDNEY DISEASE (H): ICD-10-CM

## 2022-12-06 DIAGNOSIS — I10 HYPERTENSION GOAL BP (BLOOD PRESSURE) < 140/90: ICD-10-CM

## 2022-12-08 RX ORDER — LISINOPRIL 5 MG/1
5 TABLET ORAL DAILY
Qty: 90 TABLET | Refills: 0 | Status: SHIPPED | OUTPATIENT
Start: 2022-12-08 | End: 2022-12-14

## 2022-12-08 RX ORDER — ATORVASTATIN CALCIUM 20 MG/1
20 TABLET, FILM COATED ORAL DAILY
Qty: 90 TABLET | Refills: 0 | Status: SHIPPED | OUTPATIENT
Start: 2022-12-08 | End: 2022-12-14

## 2022-12-08 NOTE — TELEPHONE ENCOUNTER
"Requested Prescriptions   Pending Prescriptions Disp Refills    atorvastatin (LIPITOR) 20 MG tablet [Pharmacy Med Name: ATORVASTATIN 20MG TABLET] 90 tablet 4     Sig: Take 1 tablet (20 mg) by mouth daily       Statins Protocol Failed - 12/6/2022  6:00 PM        Failed - LDL on file in past 12 months     Recent Labs   Lab Test 10/25/21  1810   LDL 64             Passed - No abnormal creatine kinase in past 12 months     No lab results found.             Passed - Recent (12 mo) or future (30 days) visit within the authorizing provider's specialty     Patient has had an office visit with the authorizing provider or a provider within the authorizing providers department within the previous 12 mos or has a future within next 30 days. See \"Patient Info\" tab in inbasket, or \"Choose Columns\" in Meds & Orders section of the refill encounter.              Passed - Medication is active on med list        Passed - Patient is age 18 or older          lisinopril (ZESTRIL) 5 MG tablet [Pharmacy Med Name: LISINOPRIL 5MG TABLET] 90 tablet 4     Sig: Take 1 tablet (5 mg) by mouth daily       ACE Inhibitors (Including Combos) Protocol Failed - 12/6/2022  6:00 PM        Failed - Normal serum creatinine on file in past 12 months     Recent Labs   Lab Test 10/25/21  1810   CR 1.26*       Ok to refill medication if creatinine is low          Failed - Normal serum potassium on file in past 12 months     Recent Labs   Lab Test 10/25/21  1810   POTASSIUM 4.3             Passed - Blood pressure under 140/90 in past 12 months     BP Readings from Last 3 Encounters:   08/18/22 134/71   08/10/22 132/80   08/04/22 128/80                 Passed - Recent (12 mo) or future (30 days) visit within the authorizing provider's specialty     Patient has had an office visit with the authorizing provider or a provider within the authorizing providers department within the previous 12 mos or has a future within next 30 days. See \"Patient Info\" tab in " "inbasket, or \"Choose Columns\" in Meds & Orders section of the refill encounter.              Passed - Medication is active on med list        Passed - Patient is age 18 or older             "

## 2022-12-08 NOTE — TELEPHONE ENCOUNTER
Patient needs follow up appointment prior to additional refills.   Electronically signed by Ryan Perera MD     Patient informed via [employer] to schedule, 12/13 reminder if not read to send letter.     Closing encounter.   Letitia Hummel MA

## 2022-12-14 ENCOUNTER — VIRTUAL VISIT (OUTPATIENT)
Dept: FAMILY MEDICINE | Facility: CLINIC | Age: 72
End: 2022-12-14
Payer: COMMERCIAL

## 2022-12-14 DIAGNOSIS — I48.91 ATRIAL FIBRILLATION WITH RVR (H): ICD-10-CM

## 2022-12-14 DIAGNOSIS — E78.5 HYPERLIPIDEMIA LDL GOAL <100: ICD-10-CM

## 2022-12-14 DIAGNOSIS — N18.31 STAGE 3A CHRONIC KIDNEY DISEASE (H): ICD-10-CM

## 2022-12-14 DIAGNOSIS — I10 HYPERTENSION GOAL BP (BLOOD PRESSURE) < 140/90: ICD-10-CM

## 2022-12-14 PROCEDURE — 99214 OFFICE O/P EST MOD 30 MIN: CPT | Mod: 95 | Performed by: FAMILY MEDICINE

## 2022-12-14 RX ORDER — ATORVASTATIN CALCIUM 20 MG/1
20 TABLET, FILM COATED ORAL DAILY
Qty: 90 TABLET | Refills: 3 | Status: SHIPPED | OUTPATIENT
Start: 2022-12-14 | End: 2023-12-13

## 2022-12-14 RX ORDER — LISINOPRIL 5 MG/1
5 TABLET ORAL DAILY
Qty: 90 TABLET | Refills: 3 | Status: SHIPPED | OUTPATIENT
Start: 2022-12-14 | End: 2023-12-13

## 2022-12-14 RX ORDER — METOPROLOL SUCCINATE 50 MG/1
50 TABLET, EXTENDED RELEASE ORAL DAILY
Qty: 90 TABLET | Refills: 3 | Status: SHIPPED | OUTPATIENT
Start: 2022-12-14 | End: 2023-12-13

## 2022-12-14 NOTE — PROGRESS NOTES
"Dav is a 72 year old who is being evaluated via a billable video visit.      How would you like to obtain your AVS? MyChart  If the video visit is dropped, the invitation should be resent by: Text to cell phone: [phone]  Will anyone else be joining your video visit? No        Assessment & Plan     Stage 3a chronic kidney disease (H)  Chronic, stable. The current medical regimen is effective;  continue present plan and medications.   - BASIC METABOLIC PANEL; Future    Hyperlipidemia LDL goal <100  Chronic, stable. Continue current cares.   - Lipid panel reflex to direct LDL Non-fasting; Future  - atorvastatin (LIPITOR) 20 MG tablet; Take 1 tablet (20 mg) by mouth daily    Hypertension goal BP (blood pressure) < 140/90  Chronic, BP elevated at home. Increase Toprol to 50 mg daily. Continue to monitor BP  - lisinopril (ZESTRIL) 5 MG tablet; Take 1 tablet (5 mg) by mouth daily  - metoprolol succinate ER (TOPROL XL) 50 MG 24 hr tablet; Take 1 tablet (50 mg) by mouth daily    Atrial fibrillation with RVR (H)  One episode. Rate controlled with Toprol. Continue low dose ASA.   - metoprolol succinate ER (TOPROL XL) 50 MG 24 hr tablet; Take 1 tablet (50 mg) by mouth daily    Ordering of each unique test  Prescription drug management         BMI:   Estimated body mass index is 30.34 kg/m  as calculated from the following:    Height as of 8/10/22: 1.816 m (5' 11.5\").    Weight as of 8/18/22: 100.1 kg (220 lb 9.6 oz).   Weight management plan: Discussed healthy diet and exercise guidelines    SELF MONITORING:       - Please check blood pressure readings daily       - Please monitor pulse  Work on weight loss  Regular exercise    Return in about 6 months (around 6/14/2023) for Follow up, Routine preventive, with me, in person.    Ryan Perera MD  [facility]    Subjective   Dav is a 72 year old, presenting for the following health issues:  Hypertension and Lipids      History of Present Illness "       Reason for visit:  Approve medications    He eats 2-3 servings of fruits and vegetables daily.He consumes 0 sweetened beverage(s) daily.He exercises with enough effort to increase his heart rate 30 to 60 minutes per day.  He exercises with enough effort to increase his heart rate 5 days per week.   He is taking medications regularly.       Hyperlipidemia Follow-Up      Are you regularly taking any medication or supplement to lower your cholesterol?   Yes- atorvastatin    Are you having muscle aches or other side effects that you think could be caused by your cholesterol lowering medication?  No    Hypertension Follow-up      Do you check your blood pressure regularly outside of the clinic? Yes     Are you following a low salt diet? No    Are your blood pressures ever more than 140 on the top number (systolic) OR more   than 90 on the bottom number (diastolic), for example 140/90? Yes    Atrial Fibrillation Follow-up      Symptoms: no recent chest pain, significant palpitations, dizziness/lightheadedness, dyspnea, or increased peripheral edema.    Stroke prevention: None    Date 10/25/2021 7/30/2022 7/30/2022 8/4/2022 8/18/2022   Pulse 55 56 56 57 59     Current [id number] Score: 2 points, which represents a 2.2% annual risk of major embolic event, without anti-coagulation or an LAAO device.       Patient Active Problem List   Diagnosis     CARDIOVASCULAR SCREENING; LDL GOAL LESS THAN 160     Hypertension goal BP (blood pressure) < 140/90     Advance Care Planning     Insomnia, transient     Carpal tunnel syndrome of left wrist     Carpal tunnel syndrome of right wrist     CKD (chronic kidney disease) stage 3, GFR 30-59 ml/min (H)     Atrial fibrillation with RVR (H)     Current Outpatient Medications   Medication Sig Dispense Refill     aspirin (ASA) 81 MG EC tablet Take 81 mg by mouth       atorvastatin (LIPITOR) 20 MG tablet TAKE 1 TABLET (20 MG) BY MOUTH DAILY 90 tablet 0     lisinopril (ZESTRIL) 5 MG  tablet TAKE 1 TABLET (5 MG) BY MOUTH DAILY 90 tablet 0     melatonin 1 MG/ML LIQD liquid Take 5 mg by mouth       metoprolol succinate ER (TOPROL-XL) 25 MG 24 hr tablet Take 1 tablet (25 mg) by mouth daily 90 tablet 4     Probiotic Product (PROBIOTIC DAILY PO)        Past Surgical History:   Procedure Laterality Date     COLONOSCOPY  6/10/2013    Procedure: COLONOSCOPY;  Colonoscopy;  Surgeon: Wes Pablo MD;  Location: [facility]     COLONOSCOPY N/A 5/20/2019    Procedure: COLONOSCOPY;  Surgeon: Jesus Harris MD;  Location: [facility]     HC REMOVAL OF TONSILS,<13 Y/O  5 years old    Tonsils <14y.o.     HERNIA REPAIR, INGUINAL RT/LT       JOINT REPLACEMENT, HIP RT/LT  12/19/11    Left total hip arthroplasty. [facility]     JOINT REPLACEMENT, HIP RT/LT  12/18/2017    Right tota hip arthroplasty. [facility]     RELEASE CARPAL TUNNEL Left 10/18/2017    Procedure: RELEASE CARPAL TUNNEL;  Left Carpal Tunnel Release;  Surgeon: Norris Townsend MD;  Location:  OR     ZZ COLONOSCOPY THRU STOMA W BIOPSY/CAUTERY TUMOR/POLYP/LESION  2003    hyperplastic polyp repeat in 2008.       Review of Systems   Constitutional, HEENT, cardiovascular, pulmonary, gi and gu systems are negative, except as otherwise noted.      Objective           Vitals:  No vitals were obtained today due to virtual visit.    Physical Exam   GENERAL: Healthy, alert and no distress  EYES: Eyes grossly normal to inspection.  No discharge or erythema, or obvious scleral/conjunctival abnormalities.  RESP: No audible wheeze, cough, or visible cyanosis.  No visible retractions or increased work of breathing.    SKIN: Visible skin clear. No significant rash, abnormal pigmentation or lesions.  NEURO: Cranial nerves grossly intact.  Mentation and speech appropriate for age.  PSYCH: Mentation appears normal, affect normal/bright, judgement and insight intact, normal speech and appearance well-groomed.    Lab on 11/02/2022   Component  Date Value Ref Range Status     Creatinine Urine mg/dL 11/02/2022 176  mg/dL Final     Albumin Urine mg/L 11/02/2022 21  mg/L Final     Albumin Urine mg/g Cr 11/02/2022 11.93  0.00 - 17.00 mg/g Cr Final     Hemoglobin 11/02/2022 15.9  13.3 - 17.7 g/dL Final               Video-Visit Details    Video Start Time: 1:44 PM    Type of service:  Video Visit    Video End Time:1:57 PM    Originating Location (pt. Location): Home    Distant Location (provider location):  On-site    Platform used for Video Visit: George

## 2022-12-16 ENCOUNTER — LAB (OUTPATIENT)
Dept: LAB | Facility: CLINIC | Age: 72
End: 2022-12-16
Payer: COMMERCIAL

## 2022-12-16 DIAGNOSIS — E78.5 HYPERLIPIDEMIA LDL GOAL <100: ICD-10-CM

## 2022-12-16 DIAGNOSIS — N18.31 STAGE 3A CHRONIC KIDNEY DISEASE (H): ICD-10-CM

## 2022-12-16 LAB
ANION GAP SERPL CALCULATED.3IONS-SCNC: 3 MMOL/L (ref 3–14)
BUN SERPL-MCNC: 25 MG/DL (ref 7–30)
CALCIUM SERPL-MCNC: 8.9 MG/DL (ref 8.5–10.1)
CHLORIDE BLD-SCNC: 110 MMOL/L (ref 94–109)
CHOLEST SERPL-MCNC: 137 MG/DL
CO2 SERPL-SCNC: 28 MMOL/L (ref 20–32)
CREAT SERPL-MCNC: 1.5 MG/DL (ref 0.66–1.25)
FASTING STATUS PATIENT QL REPORTED: YES
GFR SERPL CREATININE-BSD FRML MDRD: 50 ML/MIN/1.73M2
GLUCOSE BLD-MCNC: 99 MG/DL (ref 70–99)
HDLC SERPL-MCNC: 54 MG/DL
LDLC SERPL CALC-MCNC: 67 MG/DL
NONHDLC SERPL-MCNC: 83 MG/DL
POTASSIUM BLD-SCNC: 4.5 MMOL/L (ref 3.4–5.3)
SODIUM SERPL-SCNC: 141 MMOL/L (ref 133–144)
TRIGL SERPL-MCNC: 78 MG/DL

## 2022-12-16 PROCEDURE — 80061 LIPID PANEL: CPT

## 2022-12-16 PROCEDURE — 80048 BASIC METABOLIC PNL TOTAL CA: CPT

## 2022-12-16 PROCEDURE — 36415 COLL VENOUS BLD VENIPUNCTURE: CPT

## 2023-05-25 ASSESSMENT — ENCOUNTER SYMPTOMS
NAUSEA: 0
ARTHRALGIAS: 0
CONSTIPATION: 0
WEAKNESS: 0
EYE PAIN: 0
CHILLS: 0
PARESTHESIAS: 0
PALPITATIONS: 0
SORE THROAT: 0
DIZZINESS: 0
COUGH: 0
SHORTNESS OF BREATH: 0
NERVOUS/ANXIOUS: 0
DIARRHEA: 0
ABDOMINAL PAIN: 0
FREQUENCY: 0
MYALGIAS: 0
HEMATURIA: 0
HEMATOCHEZIA: 0
FEVER: 0
HEARTBURN: 0
DYSURIA: 0
JOINT SWELLING: 0
HEADACHES: 0

## 2023-05-25 ASSESSMENT — ACTIVITIES OF DAILY LIVING (ADL): CURRENT_FUNCTION: NO ASSISTANCE NEEDED

## 2023-06-01 ENCOUNTER — OFFICE VISIT (OUTPATIENT)
Dept: FAMILY MEDICINE | Facility: CLINIC | Age: 73
End: 2023-06-01
Payer: COMMERCIAL

## 2023-06-01 VITALS
HEART RATE: 50 BPM | RESPIRATION RATE: 18 BRPM | BODY MASS INDEX: 30.56 KG/M2 | WEIGHT: 218.3 LBS | DIASTOLIC BLOOD PRESSURE: 70 MMHG | OXYGEN SATURATION: 98 % | SYSTOLIC BLOOD PRESSURE: 128 MMHG | HEIGHT: 71 IN | TEMPERATURE: 96.8 F

## 2023-06-01 DIAGNOSIS — N18.31 STAGE 3A CHRONIC KIDNEY DISEASE (H): ICD-10-CM

## 2023-06-01 DIAGNOSIS — Z00.00 ENCOUNTER FOR MEDICARE ANNUAL WELLNESS EXAM: Primary | ICD-10-CM

## 2023-06-01 DIAGNOSIS — I48.91 ATRIAL FIBRILLATION WITH RVR (H): ICD-10-CM

## 2023-06-01 PROCEDURE — G0439 PPPS, SUBSEQ VISIT: HCPCS | Performed by: FAMILY MEDICINE

## 2023-06-01 ASSESSMENT — ENCOUNTER SYMPTOMS
PALPITATIONS: 0
DIARRHEA: 0
NAUSEA: 0
DIZZINESS: 0
HEARTBURN: 0
COUGH: 0
SHORTNESS OF BREATH: 0
HEMATOCHEZIA: 0
FEVER: 0
SORE THROAT: 0
FREQUENCY: 0
PARESTHESIAS: 0
HEMATURIA: 0
DYSURIA: 0
ABDOMINAL PAIN: 0
JOINT SWELLING: 0
CHILLS: 0
EYE PAIN: 0
WEAKNESS: 0
HEADACHES: 0
MYALGIAS: 0
NERVOUS/ANXIOUS: 0
ARTHRALGIAS: 0
CONSTIPATION: 0

## 2023-06-01 ASSESSMENT — PAIN SCALES - GENERAL: PAINLEVEL: NO PAIN (0)

## 2023-06-01 ASSESSMENT — ACTIVITIES OF DAILY LIVING (ADL): CURRENT_FUNCTION: NO ASSISTANCE NEEDED

## 2023-06-01 NOTE — PROGRESS NOTES
"SUBJECTIVE:   Dav is a 73 year old who presents for Preventive Visit.      6/1/2023    12:55 PM   Additional Questions   Roomed by Lily TOWNSEND     Are you in the first 12 months of your Medicare coverage?  No    Healthy Habits:     In general, how would you rate your overall health?  Good    Frequency of exercise:  4-5 days/week    Duration of exercise:  30-45 minutes    Do you usually eat at least 4 servings of fruit and vegetables a day, include whole grains    & fiber and avoid regularly eating high fat or \"junk\" foods?  No    Taking medications regularly:  Yes    Medication side effects:  None    Ability to successfully perform activities of daily living:  No assistance needed    Home Safety:  No safety concerns identified    Hearing Impairment:  Difficulty following a conversation in a noisy restaurant or crowded room and need to ask people to speak up or repeat themselves    In the past 6 months, have you been bothered by leaking of urine?  No    In general, how would you rate your overall mental or emotional health?  Good      PHQ-2 Total Score: 0    Additional concerns today:  No        Have you ever done Advance Care Planning? (For example, a Health Directive, POLST, or a discussion with a medical provider or your loved ones about your wishes): Yes, advance care planning is on file.       Fall risk  Fallen 2 or more times in the past year?: No  Any fall with injury in the past year?: No    Cognitive Screening   1) Repeat 3 items (Leader, Season, Table)    2) Clock draw: NORMAL  3) 3 item recall: Recalls 3 objects  Results: 3 items recalled: COGNITIVE IMPAIRMENT LESS LIKELY    Mini-CogTM Copyright ENIO Mckeon. Licensed by the author for use in [facility]; reprinted with permission (gualberto@.[email or web address]). All rights reserved.      Do you have sleep apnea, excessive snoring or daytime drowsiness?: no    Reviewed and updated as needed this visit by clinical staff   Tobacco  Allergies  Meds  Problems  Med " Hx  Surg Hx  Fam Hx          Reviewed and updated as needed this visit by Provider   Tobacco  Allergies  Meds  Problems  Med Hx  Surg Hx  Fam Hx         Social History     Tobacco Use     Smoking status: Never     Smokeless tobacco: Never   Vaping Use     Vaping status: Never Used     Passive vaping exposure: Yes   Substance Use Topics     Alcohol use: Yes     Comment: occasional             5/25/2023     2:47 PM   Alcohol Use   Prescreen: >3 drinks/day or >7 drinks/week? No     Do you have a current opioid prescription? No  Do you use any other controlled substances or medications that are not prescribed by a provider? None          Hyperlipidemia Follow-Up      Are you regularly taking any medication or supplement to lower your cholesterol?   Yes- Lipitor    Are you having muscle aches or other side effects that you think could be caused by your cholesterol lowering medication?  No    Hypertension Follow-up      Do you check your blood pressure regularly outside of the clinic? Yes     Are you following a low salt diet? No    Are your blood pressures ever more than 140 on the top number (systolic) OR more   than 90 on the bottom number (diastolic), for example 140/90? No    Atrial Fibrillation Follow-up      Symptoms: no recent chest pain, significant palpitations, dizziness/lightheadedness, dyspnea, or increased peripheral edema. Transient episode post op 10 years ago    Stroke prevention: None        7/30/2022    12:18 PM 7/30/2022     1:41 PM 8/4/2022     8:50 AM 8/18/2022     7:22 AM 6/1/2023    12:56 PM   Date   Pulse 56 56 57 59 50     Current [id number] Score: 2 points, which represents a 2.2% annual risk of major embolic event, without anti-coagulation or an LAAO device.     Vascular Disease Follow-up      How often do you take nitroglycerin? Never    Do you take an aspirin every day? Yes    Chronic Kidney Disease Follow-up    Do you take any over the counter pain medicine?: Yes  What over the  counter medicine are you taking for your pain?:  Tylenol      How often do you take this medicine?:  One time daily      Current providers sharing in care for this patient include:   Patient Care Team:  Ryan Perera MD as PCP - General  Ryan Perera MD as Assigned PCP  Oleksandr Parkinson DO as Assigned Surgical Provider    The following health maintenance items are reviewed in Epic and correct as of today:  Health Maintenance   Topic Date Due     COVID-19 Vaccine (5 - Pfizer series) 03/02/2023     MICROALBUMIN  11/02/2023     HEMOGLOBIN  11/02/2023     BMP  12/16/2023     LIPID  12/16/2023     COLORECTAL CANCER SCREENING  05/20/2024     MEDICARE ANNUAL WELLNESS VISIT  06/01/2024     ANNUAL REVIEW OF HM ORDERS  06/01/2024     FALL RISK ASSESSMENT  06/01/2024     ADVANCE CARE PLANNING  06/01/2028     DTAP/TDAP/TD IMMUNIZATION (3 - Td or Tdap) 10/19/2030     HEPATITIS C SCREENING  Completed     PHQ-2 (once per calendar year)  Completed     INFLUENZA VACCINE  Completed     Pneumococcal Vaccine: 65+ Years  Completed     URINALYSIS  Completed     ZOSTER IMMUNIZATION  Completed     AORTIC ANEURYSM SCREENING (SYSTEM ASSIGNED)  Completed     IPV IMMUNIZATION  Aged Out     MENINGITIS IMMUNIZATION  Aged Out     Labs reviewed in EPIC  BP Readings from Last 3 Encounters:   06/01/23 128/70   08/18/22 134/71   08/10/22 132/80    Wt Readings from Last 3 Encounters:   06/01/23 99 kg (218 lb 4.8 oz)   08/18/22 100.1 kg (220 lb 9.6 oz)   08/10/22 98.4 kg (217 lb)                  Patient Active Problem List   Diagnosis     CARDIOVASCULAR SCREENING; LDL GOAL LESS THAN 160     Hypertension goal BP (blood pressure) < 140/90     Advance Care Planning     Insomnia, transient     Carpal tunnel syndrome of left wrist     Carpal tunnel syndrome of right wrist     CKD (chronic kidney disease) stage 3, GFR 30-59 ml/min (H)     Atrial fibrillation with RVR (H)     Hyperlipidemia LDL goal <100     Past Surgical History:   Procedure  Laterality Date     COLONOSCOPY  6/10/2013    Procedure: COLONOSCOPY;  Colonoscopy;  Surgeon: Wes Pablo MD;  Location: [facility]     COLONOSCOPY N/A 5/20/2019    Procedure: COLONOSCOPY;  Surgeon: Jesus Harris MD;  Location: [facility]     HC REMOVAL OF TONSILS,<15 Y/O  5 years old    Tonsils <14y.o.     HERNIA REPAIR, INGUINAL RT/LT       JOINT REPLACEMENT, HIP RT/LT  12/19/11    Left total hip arthroplasty. [facility]     JOINT REPLACEMENT, HIP RT/LT  12/18/2017    Right tota hip arthroplasty. [facility]     RELEASE CARPAL TUNNEL Left 10/18/2017    Procedure: RELEASE CARPAL TUNNEL;  Left Carpal Tunnel Release;  Surgeon: Norris Townsend MD;  Location: [facility]     ZZ COLONOSCOPY THRU STOMA W BIOPSY/CAUTERY TUMOR/POLYP/LESION  2003    hyperplastic polyp repeat in 2008.       Social History     Tobacco Use     Smoking status: Never     Smokeless tobacco: Never   Vaping Use     Vaping status: Never Used     Passive vaping exposure: Yes   Substance Use Topics     Alcohol use: Yes     Comment: occasional     Family History   Problem Relation Age of Onset     Heart Disease Mother         Tachycardia     Osteoporosis Mother      Diabetes Father      Cancer Father         skin, lymph and colon CA     Colon Cancer Father      Arthritis Brother      Anesthesia Reaction No family hx of          Current Outpatient Medications   Medication Sig Dispense Refill     aspirin (ASA) 81 MG EC tablet Take 81 mg by mouth       atorvastatin (LIPITOR) 20 MG tablet Take 1 tablet (20 mg) by mouth daily 90 tablet 3     lisinopril (ZESTRIL) 5 MG tablet Take 1 tablet (5 mg) by mouth daily 90 tablet 3     melatonin 1 MG/ML LIQD liquid Take 5 mg by mouth       metoprolol succinate ER (TOPROL XL) 50 MG 24 hr tablet Take 1 tablet (50 mg) by mouth daily 90 tablet 3     Probiotic Product (PROBIOTIC DAILY PO)        Allergies   Allergen Reactions     No Known Drug Allergy      Recent Labs   Lab Test 12/16/22  7185  "10/25/21  1810 03/03/20  0816 02/18/20  0000 04/05/19  0936 12/08/17  0941 03/16/17  0000 03/21/16  0000   LDL 67 64  --   --  67  --   --  100.0   HDL 54 45  --  43 49  --   --  49   TRIG 78 148  --   --  71  --   --  75   ALT  --   --  26  --   --   --  41 31   CR 1.50* 1.26* 1.38* 1.4* 1.23   < > 1.3 1.3   GFRESTIMATED 50* 58* 52* 52* 60*   < > 56 56   GFRESTBLACK  --   --  60* 60 70   < >  --   --    POTASSIUM 4.5 4.3 4.4  --  4.7   < > 5.1* 4.9   TSH  --   --  1.85 3.5  --   --   --   --     < > = values in this interval not displayed.              Review of Systems   Constitutional: Negative for chills and fever.   HENT: Negative for congestion, ear pain, hearing loss and sore throat.    Eyes: Negative for pain and visual disturbance.   Respiratory: Negative for cough and shortness of breath.    Cardiovascular: Negative for chest pain, palpitations and peripheral edema.   Gastrointestinal: Negative for abdominal pain, constipation, diarrhea, heartburn, hematochezia and nausea.   Genitourinary: Negative for dysuria, frequency, genital sores, hematuria, impotence and urgency.   Musculoskeletal: Negative for arthralgias, joint swelling and myalgias.   Skin: Negative for rash.   Neurological: Negative for dizziness, weakness, headaches and paresthesias.   Psychiatric/Behavioral: Negative for mood changes. The patient is not nervous/anxious.          OBJECTIVE:   /70 (BP Location: Left arm, Patient Position: Chair)   Pulse 50   Temp 96.8  F (36  C) (Temporal)   Resp 18   Ht 1.791 m (5' 10.5\")   Wt 99 kg (218 lb 4.8 oz)   SpO2 98%   BMI 30.88 kg/m   Estimated body mass index is 30.88 kg/m  as calculated from the following:    Height as of this encounter: 1.791 m (5' 10.5\").    Weight as of this encounter: 99 kg (218 lb 4.8 oz).  Physical Exam  GENERAL: healthy, alert and no distress  EYES: Eyes grossly normal to inspection, PERRL and conjunctivae and sclerae normal  HENT: ear canals and TM's normal, " nose and mouth without ulcers or lesions  NECK: no adenopathy, no asymmetry, masses, or scars and thyroid normal to palpation  RESP: lungs clear to auscultation - no rales, rhonchi or wheezes  CV: regular rate and rhythm, normal S1 S2, no S3 or S4, no murmur, click or rub, no peripheral edema and peripheral pulses strong  ABDOMEN: soft, nontender, no hepatosplenomegaly, no masses and bowel sounds normal  MS: no gross musculoskeletal defects noted, no edema  NEURO: Normal strength and tone, mentation intact and speech normal  PSYCH: mentation appears normal, affect normal/bright  LYMPH: no cervical, supraclavicular, axillary, or inguinal adenopathy    Diagnostic Test Results:  Labs reviewed in Epic    ASSESSMENT / PLAN:       ICD-10-CM    1. Encounter for Medicare annual wellness exam  Z00.00 PRIMARY CARE FOLLOW-UP SCHEDULING     REVIEW OF HEALTH MAINTENANCE PROTOCOL ORDERS      2. Atrial fibrillation with RVR (H)  I48.91       3. Stage 3a chronic kidney disease (H)  N18.31           Patient has been advised of split billing requirements and indicates understanding: Yes      COUNSELING:  Reviewed preventive health counseling, as reflected in patient instructions       Regular exercise       Healthy diet/nutrition       Vision screening       Hearing screening       Fall risk prevention       Immunizations    Declined: Covid-19 due to Other will wait for next booster to Fall               Aspirin prophylaxis        Colon cancer screening        He reports that he has never smoked. He has never used smokeless tobacco.      Appropriate preventive services were discussed with this patient, including applicable screening as appropriate for cardiovascular disease, diabetes, osteopenia/osteoporosis, and glaucoma.  As appropriate for age/gender, discussed screening for colorectal cancer, prostate cancer, breast cancer, and cervical cancer. Checklist reviewing preventive services available has been given to the  patient.    Reviewed patients plan of care and provided an AVS. The Basic Care Plan (routine screening as documented in Health Maintenance) for Dav meets the Care Plan requirement. This Care Plan has been established and reviewed with the Patient.      Ryan Perera MD  [facility]    Identified Health Risks:    I have reviewed Opioid Use Disorder and Substance Use Disorder risk factors and made any needed referrals.

## 2023-06-01 NOTE — PATIENT INSTRUCTIONS
Patient Education   Personalized Prevention Plan  You are due for the preventive services outlined below.  Your care team is available to assist you in scheduling these services.  If you have already completed any of these items, please share that information with your care team to update in your medical record.  Health Maintenance Due   Topic Date Due     COVID-19 Vaccine (5 - Pfizer series) 03/02/2023       Understanding USDA MyPlate  The USDA has guidelines to help you make healthy food choices. These are called MyPlate. MyPlate shows the food groups that make up healthy meals using the image of a place setting. Before you eat, think about the healthiest choices for what to put on your plate or in your cup or bowl. To learn more about building a healthy plate, visit www.choosemyplate.gov.     The food groups    Fruits. Any fruit or 100% fruit juice counts as part of the Fruit Group. Fruits may be fresh, canned, frozen, or dried, and may be whole, cut-up, or pureed. Make 1/2 of your plate fruits and vegetables.    Vegetables. Any vegetable or 100% vegetable juice counts as a member of the Vegetable Group. Vegetables may be fresh, frozen, canned, or dried. They can be served raw or cooked and may be whole, cut-up, or mashed. Make 1/2 of your plate fruits and vegetables.    Grains. All foods made from grains are part of the Grains Group. These include wheat, rice, oats, cornmeal, and barley. Grains are often used to make foods such as bread, pasta, oatmeal, cereal, tortillas, and grits. Grains should be no more than 1/4 of your plate. At least half of your grains should be whole grains.    Protein. This group includes meat, poultry, seafood, beans and peas, eggs, processed soy products (such as tofu), nuts (including nut butters), and seeds. Make protein choices no more than 1/4 of your plate. Meat and poultry choices should be lean or low fat.    Dairy. The Dairy Group includes all fluid milk products and foods  made from milk that contain calcium, such as yogurt and cheese. (Foods that have little calcium, such as cream, butter, and cream cheese, are not part of this group.) Most dairy choices should be low-fat or fat-free.    Oils. Oils aren't a food group, but they do contain essential nutrients. However it's important to watch your intake of oils. These are fats that are liquid at room temperature. They include canola, corn, olive, soybean, vegetable, and sunflower oil. Foods that are mainly oil include mayonnaise, certain salad dressings, and soft margarines. You likely already get your daily oil allowance from the foods you eat.  Things to limit  Eating healthy also means limiting these things in your diet:    Salt (sodium). Many processed foods have a lot of sodium. To keep sodium intake down, eat fresh vegetables, meats, poultry, and seafood when possible. Purchase low-sodium, reduced-sodium, or no-salt-added food products at the store. And don't add salt to your meals at home. Instead, season them with herbs and spices such as dill, oregano, cumin, and paprika. Or try adding flavor with lemon or lime zest and juice.    Saturated fat. Saturated fats are most often found in animal products such as beef, pork, and chicken. They are often solid at room temperature, such as butter. To reduce your saturated fat intake, choose leaner cuts of meat and poultry. And try healthier cooking methods such as grilling, broiling, roasting, or baking. For a simple lower-fat swap, use plain nonfat yogurt instead of mayonnaise when making potato salad or macaroni salad.    Added sugars. These are sugars added to foods. They are in foods such as ice cream, candy, soda, fruit drinks, sports drinks, energy drinks, cookies, pastries, jams, and syrups. Cut down on added sugars by sharing sweet treats with a family member or friend. You can also choose fruit for dessert, and drink water or other unsweetened beverages.  [employer]Well last  reviewed this educational content on 6/1/2020 2000-2022 [employer]. All rights reserved. This information is not intended as a substitute for professional medical care. Always follow your healthcare professional's instructions.          Signs of Hearing Loss  Hearing loss is a problem shared by many people. In fact, it's one of the most common health problems, particularly as people age. Most people aged 65 and older have some hearing loss. By age 80, almost everyone does. Hearing loss often occurs slowly over the years. So, you may not realize your hearing has gotten worse.   When sudden hearing loss occurs, it's important to contact your healthcare provider right away. Your provider will do a medical exam and a hearing exam as soon as possible. This is to help find the cause and type of your sudden hearing loss. Based on your diagnosis, your healthcare provider will discuss possible treatments.      Hearing much better with one ear can be a sign of hearing loss.     Have your hearing checked  Call your healthcare provider if you:     Have to strain to hear normal conversation    Have to watch other people s faces very carefully to follow what they re saying    Need to ask people to repeat what they ve said    Often misunderstand what people are saying    Turn the volume of the television or radio up so high that others complain    Feel that people are mumbling when they re talking to you    Find that the effort to hear leaves you feeling tired and irritated    Notice, when using the phone, that you hear better with one ear than the other  StayWell last reviewed this educational content on 6/1/2022 2000-2022 [employer]. All rights reserved. This information is not intended as a substitute for professional medical care. Always follow your healthcare professional's instructions.

## 2023-06-15 NOTE — DISCHARGE INSTRUCTIONS
Be sure to drink plenty of fluids and rest.    Antibiotics as prescribed.    Can use a saline nasal spray if desired to help moisten and clear the nasal passages.    Follow up in clinic if not improving through the next week or so and return at anytime for worsening, changes or concerns.    I hope you feel MUCH better soon!!   Sarecycline Pregnancy And Lactation Text: This medication is Pregnancy Category D and not consider safe during pregnancy. It is also excreted in breast milk.

## 2023-09-18 ENCOUNTER — MYC MEDICAL ADVICE (OUTPATIENT)
Dept: FAMILY MEDICINE | Facility: CLINIC | Age: 73
End: 2023-09-18
Payer: COMMERCIAL

## 2023-10-02 ENCOUNTER — APPOINTMENT (OUTPATIENT)
Dept: URBAN - METROPOLITAN AREA CLINIC 257 | Age: 73
Setting detail: DERMATOLOGY
End: 2023-10-02

## 2023-10-02 DIAGNOSIS — L57.8 OTHER SKIN CHANGES DUE TO CHRONIC EXPOSURE TO NONIONIZING RADIATION: ICD-10-CM

## 2023-10-02 DIAGNOSIS — D22 MELANOCYTIC NEVI: ICD-10-CM

## 2023-10-02 DIAGNOSIS — L82.1 OTHER SEBORRHEIC KERATOSIS: ICD-10-CM

## 2023-10-02 PROBLEM — D22.5 MELANOCYTIC NEVI OF TRUNK: Status: ACTIVE | Noted: 2023-10-02

## 2023-10-02 PROCEDURE — OTHER MIPS QUALITY: OTHER

## 2023-10-02 PROCEDURE — OTHER COUNSELING: OTHER

## 2023-10-02 PROCEDURE — 99212 OFFICE O/P EST SF 10 MIN: CPT

## 2023-10-02 ASSESSMENT — LOCATION ZONE DERM: LOCATION ZONE: TRUNK

## 2023-10-02 ASSESSMENT — LOCATION SIMPLE DESCRIPTION DERM
LOCATION SIMPLE: UPPER BACK
LOCATION SIMPLE: RIGHT UPPER BACK

## 2023-10-02 ASSESSMENT — LOCATION DETAILED DESCRIPTION DERM
LOCATION DETAILED: RIGHT SUPERIOR MEDIAL UPPER BACK
LOCATION DETAILED: RIGHT MEDIAL UPPER BACK
LOCATION DETAILED: INFERIOR THORACIC SPINE

## 2023-10-02 NOTE — PROCEDURE: MIPS QUALITY
Quality 226: Preventive Care And Screening: Tobacco Use: Screening And Cessation Intervention: Patient screened for tobacco use and is an ex/non-smoker
Quality 110: Preventive Care And Screening: Influenza Immunization: Influenza Immunization Ordered or Recommended, but not Administered due to system reason
Quality 130: Documentation Of Current Medications In The Medical Record: Current Medications Documented
Detail Level: Detailed
Quality 111:Pneumonia Vaccination Status For Older Adults: Patient did not receive any pneumococcal conjugate or polysaccharide vaccine on or after their 60th birthday and before the end of the measurement period

## 2023-10-31 ENCOUNTER — IMMUNIZATION (OUTPATIENT)
Dept: FAMILY MEDICINE | Facility: CLINIC | Age: 73
End: 2023-10-31
Payer: COMMERCIAL

## 2023-10-31 PROCEDURE — G0008 ADMIN INFLUENZA VIRUS VAC: HCPCS

## 2023-10-31 PROCEDURE — 90662 IIV NO PRSV INCREASED AG IM: CPT

## 2023-10-31 PROCEDURE — 90480 ADMN SARSCOV2 VAC 1/ONLY CMP: CPT

## 2023-10-31 PROCEDURE — 91320 SARSCV2 VAC 30MCG TRS-SUC IM: CPT

## 2023-11-09 ENCOUNTER — HOSPITAL ENCOUNTER (EMERGENCY)
Facility: CLINIC | Age: 73
Discharge: HOME | End: 2023-11-09
Attending: STUDENT IN AN ORGANIZED HEALTH CARE EDUCATION/TRAINING PROGRAM | Admitting: STUDENT IN AN ORGANIZED HEALTH CARE EDUCATION/TRAINING PROGRAM
Payer: MEDICARE

## 2023-11-09 ENCOUNTER — APPOINTMENT (OUTPATIENT)
Dept: CT IMAGING | Facility: CLINIC | Age: 73
End: 2023-11-09
Attending: STUDENT IN AN ORGANIZED HEALTH CARE EDUCATION/TRAINING PROGRAM
Payer: MEDICARE

## 2023-11-09 VITALS
BODY MASS INDEX: 31.36 KG/M2 | TEMPERATURE: 98.3 F | HEIGHT: 71 IN | HEART RATE: 73 BPM | RESPIRATION RATE: 20 BRPM | WEIGHT: 224 LBS | OXYGEN SATURATION: 99 % | DIASTOLIC BLOOD PRESSURE: 92 MMHG | SYSTOLIC BLOOD PRESSURE: 141 MMHG

## 2023-11-09 DIAGNOSIS — M25.511 PAIN AND SWELLING OF RIGHT SHOULDER: ICD-10-CM

## 2023-11-09 DIAGNOSIS — M25.411 PAIN AND SWELLING OF RIGHT SHOULDER: ICD-10-CM

## 2023-11-09 PROCEDURE — G1010 CDSM STANSON: HCPCS

## 2023-11-09 PROCEDURE — 99284 EMERGENCY DEPT VISIT MOD MDM: CPT | Performed by: STUDENT IN AN ORGANIZED HEALTH CARE EDUCATION/TRAINING PROGRAM

## 2023-11-09 PROCEDURE — 99284 EMERGENCY DEPT VISIT MOD MDM: CPT | Mod: 25

## 2023-11-09 ASSESSMENT — ACTIVITIES OF DAILY LIVING (ADL): ADLS_ACUITY_SCORE: 35

## 2023-11-09 NOTE — ED PROVIDER NOTES
History     Chief Complaint   Patient presents with    Shoulder Pain     HPI  Dav Seay is a 73 year old male who presenting with right shoulder swelling.  He notes that over the past 3 to 5 days it is increased about 10 to 15% and has been having some mild discomfort to the area as well.  He has never had issues with this before.  It started about 2 to 3 months ago and denies any believe that he have had any trauma to that area but did fall and hurt his left shoulder approximately 3 to 5 months ago.  He has no difficulties with his upper extremity and has no neurological changes such as numbness tingling or any weakness.    Allergies:  Allergies   Allergen Reactions    No Known Drug Allergy        Problem List:    Patient Active Problem List    Diagnosis Date Noted    Hyperlipidemia LDL goal <100 12/14/2022     Priority: Medium    Atrial fibrillation with RVR (H) 10/25/2021     Priority: Medium    CKD (chronic kidney disease) stage 3, GFR 30-59 ml/min (H) 01/08/2018     Priority: Medium    Carpal tunnel syndrome of left wrist 07/25/2017     Priority: Medium    Carpal tunnel syndrome of right wrist 07/25/2017     Priority: Medium    Insomnia, transient 12/02/2013     Priority: Medium    Advance Care Planning 11/30/2012     Priority: Medium     Advance Care Planning 9/16/2015: Receipt of ACP document:  Received: Health Care Directive which was witnessed or notarized on 8/29/15.  Document not previously scanned.  Validation form completed and sent with document to be scanned.  Code Status needs to be updated to reflect choices in most recent ACP document. Confirmed/documented designated decision maker(s).  Added by Tara Davis RN, BSN, MA, Advance Care Planning Liaison.  Advance Care Planning 11/30/12: Patient states has Advance Directive and will bring in a copy to clinic. 11/30/2012  Henri/FLOR         Hypertension goal BP (blood pressure) < 140/90 02/15/2012     Priority: Medium    CARDIOVASCULAR  SCREENING; LDL GOAL LESS THAN 160 10/31/2010     Priority: Medium        Past Medical History:    Past Medical History:   Diagnosis Date    Arthritis 2007    Hypertension     Traumatic amputation of other finger(s) (complete) (partial), without mention of complication 1975       Past Surgical History:    Past Surgical History:   Procedure Laterality Date    COLONOSCOPY  6/10/2013    Procedure: COLONOSCOPY;  Colonoscopy;  Surgeon: Wes Pablo MD;  Location: [facility]    COLONOSCOPY N/A 5/20/2019    Procedure: COLONOSCOPY;  Surgeon: Jesus Harris MD;  Location: [facility]    HC REMOVAL OF TONSILS,<15 Y/O  5 years old    Tonsils <14y.o.    HERNIA REPAIR, INGUINAL RT/LT      JOINT REPLACEMENT, HIP RT/LT  12/19/11    Left total hip arthroplasty. [facility]    JOINT REPLACEMENT, HIP RT/LT  12/18/2017    Right tota hip arthroplasty. [facility]    RELEASE CARPAL TUNNEL Left 10/18/2017    Procedure: RELEASE CARPAL TUNNEL;  Left Carpal Tunnel Release;  Surgeon: Norris Townsend MD;  Location:  OR    ZZ COLONOSCOPY THRU STOMA W BIOPSY/CAUTERY TUMOR/POLYP/LESION  2003    hyperplastic polyp repeat in 2008.       Family History:    Family History   Problem Relation Age of Onset    Heart Disease Mother         Tachycardia    Osteoporosis Mother     Diabetes Father     Cancer Father         skin, lymph and colon CA    Colon Cancer Father     Arthritis Brother     Anesthesia Reaction No family hx of        Social History:  Marital Status:   [2]  Social History     Tobacco Use    Smoking status: Never    Smokeless tobacco: Never   Vaping Use    Vaping Use: Never used   Substance Use Topics    Alcohol use: Yes     Comment: occasional    Drug use: No        Medications:    aspirin (ASA) 81 MG EC tablet  atorvastatin (LIPITOR) 20 MG tablet  lisinopril (ZESTRIL) 5 MG tablet  melatonin 1 MG/ML LIQD liquid  metoprolol succinate ER (TOPROL XL) 50 MG 24 hr tablet  Probiotic Product (PROBIOTIC DAILY  "PO)          Review of Systems   Skin:         Shoulder growth   All other systems reviewed and are negative.      Physical Exam   BP: (!) 141/92  Pulse: 73  Temp: 98.3  F (36.8  C)  Resp: 20  Height: 180.3 cm (5' 11\")  Weight: 101.6 kg (224 lb)  SpO2: 99 %      Physical Exam  Vitals and nursing note reviewed.   Constitutional:       General: He is not in acute distress.     Appearance: Normal appearance. He is normal weight. He is not ill-appearing or toxic-appearing.   HENT:      Head: Normocephalic and atraumatic.   Cardiovascular:      Rate and Rhythm: Normal rate.      Pulses: Normal pulses.      Heart sounds: Normal heart sounds.   Pulmonary:      Effort: Pulmonary effort is normal. No respiratory distress.      Breath sounds: Normal breath sounds. No wheezing.   Musculoskeletal:         General: Swelling present.   Skin:     General: Skin is warm and dry.      Capillary Refill: Capillary refill takes less than 2 seconds.   Neurological:      General: No focal deficit present.      Mental Status: He is alert and oriented to person, place, and time.   Psychiatric:         Mood and Affect: Mood normal.         Behavior: Behavior normal.         Thought Content: Thought content normal.               ED Course                 Procedures                  Results for orders placed or performed during the hospital encounter of 11/09/23 (from the past 24 hour(s))   CT Shoulder Right w/o Contrast    Narrative    CT SHOULDER RIGHT W/O CONTRAST 11/9/2023 11:44 AM    INDICATION: growth 2-3 months, AC joint  COMPARISON: None.    TECHNIQUE: Noncontrast. Axial, sagittal and coronal thin-section  reconstruction. Dose reduction techniques were used.   CONTRAST: None.    FINDINGS:   Moderate degenerative arthritis of the acromioclavicular joint.  Advanced degenerative arthritis of the glenohumeral joint with  hypertrophic changes. Prominent hypodense multiloculated fluid  collection extending cranially from the " acromioclavicular joint  measuring 4.6 cm CC by 5.2 cm transverse by 4.2 cm CC (series 7 image  56 and series 6 image 43). Small glenohumeral joint effusion and/or  synovitis. No acute fracture.    Lower cervical spondylosis.    No significant fatty atrophy of the rotator cuff. Mild dependent  atelectasis. Small nodule within the right middle lobe measuring 0.5  cm (series 6 image 250). Scattered vascular calcifications.      Impression    IMPRESSION:  1.  Multiloculated fluid collection extending cranially from the  acromioclavicular joint which may be an AC joint ganglion and/or  communicating with the glenohumeral joint through a rotator cuff tear  (geyser phenomenon). Nonemergent MRI of the shoulder is recommended  for full characterization.  2.  Moderate degenerative arthritis of the acromioclavicular joint and  advanced degenerative arthritis of the glenohumeral joint.  3.  No acute fracture.  4.  Sub-6 mm nodule within the right middle lobe, likely benign.  Follow-up CT chest in 12 months could be obtained as clinically  indicated.    NOTE: ABNORMAL REPORT    THE DICTATION ABOVE DESCRIBES AN ABNORMAL REPORT FOR WHICH FOLLOW-UP  IS NEEDED.    OLGA MCBRIDE DO         SYSTEM ID:  [id number]       Medications - No data to display    Assessments & Plan (with Medical Decision Making)     I have reviewed the nursing notes.    I have reviewed the findings, diagnosis, plan and need for follow up with the patient.        Medical Decision Making  Pleasant 73-year-old male presenting with right shoulder growth.  On examination there is fluctuance without tenderness and no overlying skin changes concerning for any kind of cellulitis and or acute bony deformities.  He has no acute history of injury.  He has no weakness or right upper extremity numbness or tingling.  With the fluctuance of the area ultrasound at bedside was completed which did show a fluid collection however was unable to fully evaluate the extent of the  swelling therefore CT imaging was completed which showed multiloculated fluid collection extending from the AC joint versus the no humeral joint possibly through rotator cuff tear.  Discussed case with orthopedics at this time do not feel comfortable with bedside drainage due to the multiloculated nature of the fluid collection and with no acute signs of infection do not believe this is to be an acute abscess and or infection and with no acute functional reductions in his abilities feel that this may be better done through orthopedics.   referral placed.  Recommendations to follow-up if no contact from orthopedics over the next 3 to 5 days.  Patient understands recommendations and feels comfortable with discharge plan.  Supportive care discussed.  Patient discharged home.      New Prescriptions    No medications on file       Final diagnoses:   Pain and swelling of right shoulder       11/9/2023   [facility] EMERGENCY DEPT       Robin Pacheco MD  11/09/23 0563

## 2023-11-09 NOTE — DISCHARGE INSTRUCTIONS
Multiloculated fluid collection extending cranially from the  acromioclavicular joint which may be an AC joint ganglion and/or  communicating with the glenohumeral joint through a rotator cuff tear  (geyser phenomenon). Nonemergent MRI of the shoulder is recommended  for full characterization.  Moderate degenerative arthritis of the acromioclavicular joint and  advanced degenerative arthritis of the glenohumeral joint.  Sub-6 mm nodule within the right middle lobe, likely benign.  Follow-up CT chest in 12 months could be obtained as clinically  indicated.   negative

## 2023-11-09 NOTE — ED TRIAGE NOTES
Pt reports a lump on his right shoulder for the past couple months that has gotten bigger and is started to be painful.      Triage Assessment (Adult)       Row Name 11/09/23 1006          Triage Assessment    Airway WDL WDL        Respiratory WDL    Respiratory WDL WDL        Skin Circulation/Temperature WDL    Skin Circulation/Temperature WDL X

## 2023-11-10 NOTE — TELEPHONE ENCOUNTER
DIAGNOSIS: Pain and swelling of right shoulder    APPOINTMENT DATE: 11/22/2023   NOTES STATUS DETAILS   OFFICE NOTE from referring provider N/A    OFFICE NOTE from other specialist N/A    DISCHARGE SUMMARY from hospital N/A    DISCHARGE REPORT from the ER Internal 11/09/2023 [facility] ED    OPERATIVE REPORT N/A    EMG report N/A    MEDICATION LIST N/A    MRI N/A    DEXA (osteoporosis/bone health) N/A    CT SCAN Internal 11/09/2023 RT shoulder   XRAYS (IMAGES & REPORTS) N/A

## 2023-11-22 ENCOUNTER — OFFICE VISIT (OUTPATIENT)
Dept: ORTHOPEDICS | Facility: CLINIC | Age: 73
End: 2023-11-22
Attending: STUDENT IN AN ORGANIZED HEALTH CARE EDUCATION/TRAINING PROGRAM
Payer: COMMERCIAL

## 2023-11-22 ENCOUNTER — PRE VISIT (OUTPATIENT)
Dept: ORTHOPEDICS | Facility: CLINIC | Age: 73
End: 2023-11-22

## 2023-11-22 DIAGNOSIS — M25.411 PAIN AND SWELLING OF RIGHT SHOULDER: ICD-10-CM

## 2023-11-22 DIAGNOSIS — R91.1 LUNG NODULE SEEN ON IMAGING STUDY: Primary | ICD-10-CM

## 2023-11-22 DIAGNOSIS — M25.511 PAIN AND SWELLING OF RIGHT SHOULDER: ICD-10-CM

## 2023-11-22 PROCEDURE — 99214 OFFICE O/P EST MOD 30 MIN: CPT | Performed by: FAMILY MEDICINE

## 2023-11-22 NOTE — PROGRESS NOTES
CHIEF COMPLAINT:  Consult (Right shoulder swelling on the superior(over the A/C joint) aspect.  Pain at 1-2.  Full ROM no increase in pain.  1.5 months since patient noticed swelling.  Has reduced since then.)       HISTORY OF PRESENT ILLNESS  Mr. Seay is a pleasant 73 year old male who presents to clinic today with right shoulder pain.  Dav has been experiencing swelling in his shoulder that has been present for many weeks.  No single inciting event that he can recall.  He points to the top of his right shoulder, this is his nondominant arm.  He has no pain, he can move his shoulder in every direction pain-free and has no dysfunction.  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        Additional history: as documented    MEDICAL HISTORY  Patient Active Problem List   Diagnosis    CARDIOVASCULAR SCREENING; LDL GOAL LESS THAN 160    Hypertension goal BP (blood pressure) < 140/90    Advance Care Planning    Insomnia, transient    Carpal tunnel syndrome of left wrist    Carpal tunnel syndrome of right wrist    CKD (chronic kidney disease) stage 3, GFR 30-59 ml/min (H)    Atrial fibrillation with RVR (H)    Hyperlipidemia LDL goal <100       Current Outpatient Medications   Medication Sig Dispense Refill    aspirin (ASA) 81 MG EC tablet Take 81 mg by mouth      atorvastatin (LIPITOR) 20 MG tablet Take 1 tablet (20 mg) by mouth daily 90 tablet 3    lisinopril (ZESTRIL) 5 MG tablet Take 1 tablet (5 mg) by mouth daily 90 tablet 3    melatonin 1 MG/ML LIQD liquid Take 5 mg by mouth      metoprolol succinate ER (TOPROL XL) 50 MG 24 hr tablet Take 1 tablet (50 mg) by mouth daily 90 tablet 3    Probiotic Product (PROBIOTIC DAILY PO)          Allergies   Allergen Reactions    No Known Drug Allergy        Family History   Problem Relation Age of Onset    Heart Disease Mother         Tachycardia    Osteoporosis Mother     Diabetes Father     Cancer Father         skin, lymph and colon CA    Colon Cancer Father     Arthritis  Brother     Anesthesia Reaction No family hx of        Additional medical/Social/Surgical histories reviewed in EPIC and updated as appropriate.        PHYSICAL EXAM  General  - normal appearance, in no obvious distress  Musculoskeletal - right shoulder  - inspection: prominent fluid collection over the AC joint  - palpation: Fluctuance over fluid collection, not painful  - ROM:  180 deg flexion   45 deg extension   150 deg abduction   90 deg ER   70 deg IR  - strength: 5/5  strength, 5/5 in all shoulder planes  - special tests:  (-) Speed's  (-) Neer  (-) Hawkin's  (-) Wally  (-) Friendship's  Neuro  - no sensory or motor deficit, grossly normal coordination, normal muscle tone                 ASSESSMENT & PLAN  Mr. Seay is a 73 year old male who presents to clinic today with swelling on the top of his right shoulder.    I reviewed his shoulder CT in the room with him, this was taken at his emergency room visit on November 9.  This does reveal swelling inside the shoulder at the glenohumeral joint and arising from the AC joint with a potential geyser phenomenon.  He does have glenohumeral osteoarthritis as well.    We did discuss that this is more than likely a cystic structure arising from the joint.  Together we discussed that this is low risk for any insidious process and that given the nonpainful and not dysfunctional nature of this it may be best to consider either watchful waiting or further workup with an MRI.  Through shared decision making Dav and I did decide to watchfully wait for resolution, although if this gets bigger we would likely decide to order an MRI.    Incidentally, a small lung nodule was also noticed on his imaging exam.  I am referring him to our lung nodule clinic for further potential workup.    It was a pleasure seeing Dav today.    Terence De Jesus DO, CAM  Primary Care Sports Medicine      This note was constructed using Dragon dictation software, please excuse any minor errors in  spelling, grammar, or syntax.

## 2023-11-22 NOTE — LETTER
11/22/2023         RE: Dav Seay  [id number]  [place] [id number]        Dear Colleague,    Thank you for referring your patient, Dav Seay, to the [facility] [place]. Please see a copy of my visit note below.    CHIEF COMPLAINT:  Consult (Right shoulder swelling on the superior(over the A/C joint) aspect.  Pain at 1-2.  Full ROM no increase in pain.  1.5 months since patient noticed swelling.  Has reduced since then.)       HISTORY OF PRESENT ILLNESS  Mr. Seay is a pleasant 73 year old male who presents to clinic today with right shoulder pain.  Dav has been experiencing swelling in his shoulder that has been present for many weeks.  No single inciting event that he can recall.  He points to the top of his right shoulder, this is his nondominant arm.  He has no pain, he can move his shoulder in every direction pain-free and has no dysfunction.  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        Additional history: as documented    MEDICAL HISTORY  Patient Active Problem List   Diagnosis     CARDIOVASCULAR SCREENING; LDL GOAL LESS THAN 160     Hypertension goal BP (blood pressure) < 140/90     Advance Care Planning     Insomnia, transient     Carpal tunnel syndrome of left wrist     Carpal tunnel syndrome of right wrist     CKD (chronic kidney disease) stage 3, GFR 30-59 ml/min (H)     Atrial fibrillation with RVR (H)     Hyperlipidemia LDL goal <100       Current Outpatient Medications   Medication Sig Dispense Refill     aspirin (ASA) 81 MG EC tablet Take 81 mg by mouth       atorvastatin (LIPITOR) 20 MG tablet Take 1 tablet (20 mg) by mouth daily 90 tablet 3     lisinopril (ZESTRIL) 5 MG tablet Take 1 tablet (5 mg) by mouth daily 90 tablet 3     melatonin 1 MG/ML LIQD liquid Take 5 mg by mouth       metoprolol succinate ER (TOPROL XL) 50 MG 24 hr tablet Take 1 tablet (50 mg) by mouth daily 90 tablet 3     Probiotic Product (PROBIOTIC DAILY PO)           Allergies   Allergen Reactions     No Known Drug Allergy        Family History   Problem Relation Age of Onset     Heart Disease Mother         Tachycardia     Osteoporosis Mother      Diabetes Father      Cancer Father         skin, lymph and colon CA     Colon Cancer Father      Arthritis Brother      Anesthesia Reaction No family hx of        Additional medical/Social/Surgical histories reviewed in EPIC and updated as appropriate.        PHYSICAL EXAM  General  - normal appearance, in no obvious distress  Musculoskeletal - right shoulder  - inspection: prominent fluid collection over the AC joint  - palpation: Fluctuance over fluid collection, not painful  - ROM:  180 deg flexion   45 deg extension   150 deg abduction   90 deg ER   70 deg IR  - strength: 5/5  strength, 5/5 in all shoulder planes  - special tests:  (-) Speed's  (-) Neer  (-) Hawkin's  (-) Wally  (-) Orient's  Neuro  - no sensory or motor deficit, grossly normal coordination, normal muscle tone                 ASSESSMENT & PLAN  Mr. Seay is a 73 year old male who presents to clinic today with swelling on the top of his right shoulder.    I reviewed his shoulder CT in the room with him, this was taken at his emergency room visit on November 9.  This does reveal swelling inside the shoulder at the glenohumeral joint and arising from the AC joint with a potential geyser phenomenon.  He does have glenohumeral osteoarthritis as well.    We did discuss that this is more than likely a cystic structure arising from the joint.  Together we discussed that this is low risk for any insidious process and that given the nonpainful and not dysfunctional nature of this it may be best to consider either watchful waiting or further workup with an MRI.  Through shared decision making Dav and I did decide to watchfully wait for resolution, although if this gets bigger we would likely decide to order an MRI.    Incidentally, a small lung nodule was also  noticed on his imaging exam.  I am referring him to our lung nodule clinic for further potential workup.    It was a pleasure seeing Dav today.    Terence De Jesus DO, [facility]  Primary Care Sports Medicine      This note was constructed using Dragon dictation software, please excuse any minor errors in spelling, grammar, or syntax.      Again, thank you for allowing me to participate in the care of your patient.        Sincerely,        Terence De Jesus DO

## 2023-11-24 ENCOUNTER — PATIENT OUTREACH (OUTPATIENT)
Dept: ONCOLOGY | Facility: CLINIC | Age: 73
End: 2023-11-24
Payer: COMMERCIAL

## 2023-11-24 DIAGNOSIS — R91.8 PULMONARY NODULES: Primary | ICD-10-CM

## 2023-11-24 NOTE — PROGRESS NOTES
New IP (Interventional Pulmonology) referral rec'd.  Chart reviewed.         New Patient: Interventional Pulmonary (Lung nodule) Nurse Navigator Note    Referring provider: Terence De Jesus [facility]    Referred to (specialty): Interventional Pulmonary (Lung nodule)    Requested provider (if applicable): n/a    Date Referral Received: 11/22/2023    Evaluation for :  Lung nodule-does patient need follow up for lung nodule    Clinical History (per Nurse review of records provided):    **BOOK MARKED**  CT SHOULDER RIGHT W/O CONTRAST 11/9/2023 11:44 AM     INDICATION: growth 2-3 months, AC joint  COMPARISON: None.     TECHNIQUE: Noncontrast. Axial, sagittal and coronal thin-section  reconstruction. Dose reduction techniques were used.   CONTRAST: None.     FINDINGS:   Moderate degenerative arthritis of the acromioclavicular joint.  Advanced degenerative arthritis of the glenohumeral joint with  hypertrophic changes. Prominent hypodense multiloculated fluid  collection extending cranially from the acromioclavicular joint  measuring 4.6 cm CC by 5.2 cm transverse by 4.2 cm CC (series 7 image  56 and series 6 image 43). Small glenohumeral joint effusion and/or  synovitis. No acute fracture.     Lower cervical spondylosis.     No significant fatty atrophy of the rotator cuff. Mild dependent  atelectasis. Small nodule within the right middle lobe measuring 0.5  cm (series 6 image 250). Scattered vascular calcifications.                                                                      IMPRESSION:  1.  Multiloculated fluid collection extending cranially from the  acromioclavicular joint which may be an AC joint ganglion and/or  communicating with the glenohumeral joint through a rotator cuff tear  (geyser phenomenon). Nonemergent MRI of the shoulder is recommended  for full characterization.  2.  Moderate degenerative arthritis of the acromioclavicular joint and  advanced  degenerative arthritis of the glenohumeral joint.  3.  No acute fracture.  4.  Sub-6 mm nodule within the right middle lobe, likely benign.  Follow-up CT chest in 12 months could be obtained as clinically  indicated.    Records Location: [facility]     RECORDS NEEDED:  2/18/2020:  Chest AP or PA and Lateral 2 Views   imaging pushed to PACS from [facility]----thank you!!    Additional testing needed prior to consult: CT Chest

## 2023-11-27 ENCOUNTER — PRE VISIT (OUTPATIENT)
Dept: ONCOLOGY | Facility: CLINIC | Age: 73
End: 2023-11-27
Payer: COMMERCIAL

## 2023-11-27 NOTE — TELEPHONE ENCOUNTER
RECORDS STATUS - ALL OTHER DIAGNOSIS    Lung Nodule   RECORDS RECEIVED FROM: [facility]    Appt Date: TBD NN WQ    Action    Action Taken 11/27/2023 8:59AM KEJEREMY     I faxed over a request for imaging to [facility]       NOTES STATUS DETAILS   OFFICE NOTE from referring provider Terence Moralez,     OFFICE NOTE from medical oncologist     DISCHARGE SUMMARY from hospital     DISCHARGE REPORT from the ER     OPERATIVE REPORT     MEDICATION LIST     CLINICAL TRIAL TREATMENTS TO DATE     LABS     PATHOLOGY REPORTS     ANYTHING RELATED TO DIAGNOSIS Complete Labs last updated on 12/16/2022   GENONOMIC TESTING     TYPE:     IMAGING (NEED IMAGES & REPORT) 2/18/2020:  Chest AP or PA and Lateral 2 Views   imaging pushed to PACS from [facility]     DX Chest  Requested- [facility]  2/18/2020    MRI     MAMMO     ULTRASOUND     PET

## 2023-12-13 ENCOUNTER — OFFICE VISIT (OUTPATIENT)
Dept: FAMILY MEDICINE | Facility: CLINIC | Age: 73
End: 2023-12-13
Payer: COMMERCIAL

## 2023-12-13 VITALS
RESPIRATION RATE: 16 BRPM | DIASTOLIC BLOOD PRESSURE: 84 MMHG | TEMPERATURE: 97.7 F | HEART RATE: 46 BPM | BODY MASS INDEX: 30.96 KG/M2 | OXYGEN SATURATION: 99 % | WEIGHT: 222 LBS | SYSTOLIC BLOOD PRESSURE: 136 MMHG

## 2023-12-13 DIAGNOSIS — I10 HYPERTENSION GOAL BP (BLOOD PRESSURE) < 140/90: ICD-10-CM

## 2023-12-13 DIAGNOSIS — E78.5 HYPERLIPIDEMIA LDL GOAL <100: ICD-10-CM

## 2023-12-13 DIAGNOSIS — K40.91 RECURRENT INGUINAL HERNIA OF LEFT SIDE WITHOUT OBSTRUCTION OR GANGRENE: ICD-10-CM

## 2023-12-13 DIAGNOSIS — N18.30 STAGE 3 CHRONIC KIDNEY DISEASE, UNSPECIFIED WHETHER STAGE 3A OR 3B CKD (H): Primary | ICD-10-CM

## 2023-12-13 DIAGNOSIS — I48.91 ATRIAL FIBRILLATION WITH RVR (H): ICD-10-CM

## 2023-12-13 LAB
ANION GAP SERPL CALCULATED.3IONS-SCNC: 9 MMOL/L (ref 7–15)
BUN SERPL-MCNC: 20.6 MG/DL (ref 8–23)
CALCIUM SERPL-MCNC: 9.5 MG/DL (ref 8.8–10.2)
CHLORIDE SERPL-SCNC: 103 MMOL/L (ref 98–107)
CHOLEST SERPL-MCNC: 156 MG/DL
CREAT SERPL-MCNC: 1.47 MG/DL (ref 0.67–1.17)
CREAT UR-MCNC: 77 MG/DL
DEPRECATED HCO3 PLAS-SCNC: 27 MMOL/L (ref 22–29)
EGFRCR SERPLBLD CKD-EPI 2021: 51 ML/MIN/1.73M2
FASTING STATUS PATIENT QL REPORTED: NO
GLUCOSE SERPL-MCNC: 123 MG/DL (ref 70–99)
HDLC SERPL-MCNC: 47 MG/DL
HGB BLD-MCNC: 15.6 G/DL (ref 13.3–17.7)
LDLC SERPL CALC-MCNC: 87 MG/DL
MICROALBUMIN UR-MCNC: <12 MG/L
MICROALBUMIN/CREAT UR: NORMAL MG/G{CREAT}
NONHDLC SERPL-MCNC: 109 MG/DL
POTASSIUM SERPL-SCNC: 4.5 MMOL/L (ref 3.4–5.3)
SODIUM SERPL-SCNC: 139 MMOL/L (ref 135–145)
TRIGL SERPL-MCNC: 112 MG/DL

## 2023-12-13 PROCEDURE — 80048 BASIC METABOLIC PNL TOTAL CA: CPT | Performed by: FAMILY MEDICINE

## 2023-12-13 PROCEDURE — 85018 HEMOGLOBIN: CPT | Performed by: FAMILY MEDICINE

## 2023-12-13 PROCEDURE — 99214 OFFICE O/P EST MOD 30 MIN: CPT | Performed by: FAMILY MEDICINE

## 2023-12-13 PROCEDURE — 82043 UR ALBUMIN QUANTITATIVE: CPT | Performed by: FAMILY MEDICINE

## 2023-12-13 PROCEDURE — 82570 ASSAY OF URINE CREATININE: CPT | Performed by: FAMILY MEDICINE

## 2023-12-13 PROCEDURE — 80061 LIPID PANEL: CPT | Performed by: FAMILY MEDICINE

## 2023-12-13 PROCEDURE — 36415 COLL VENOUS BLD VENIPUNCTURE: CPT | Performed by: FAMILY MEDICINE

## 2023-12-13 RX ORDER — LISINOPRIL 5 MG/1
5 TABLET ORAL DAILY
Qty: 90 TABLET | Refills: 3 | Status: SHIPPED | OUTPATIENT
Start: 2023-12-13

## 2023-12-13 RX ORDER — ATORVASTATIN CALCIUM 20 MG/1
20 TABLET, FILM COATED ORAL DAILY
Qty: 90 TABLET | Refills: 3 | Status: SHIPPED | OUTPATIENT
Start: 2023-12-13

## 2023-12-13 RX ORDER — METOPROLOL SUCCINATE 50 MG/1
50 TABLET, EXTENDED RELEASE ORAL DAILY
Qty: 90 TABLET | Refills: 3 | Status: SHIPPED | OUTPATIENT
Start: 2023-12-13

## 2023-12-13 RX ORDER — RESPIRATORY SYNCYTIAL VIRUS VACCINE 120MCG/0.5
0.5 KIT INTRAMUSCULAR ONCE
Qty: 1 EACH | Refills: 0 | Status: CANCELLED | OUTPATIENT
Start: 2023-12-13 | End: 2023-12-13

## 2023-12-13 NOTE — PROGRESS NOTES
Assessment & Plan     Stage 3 chronic kidney disease, unspecified whether stage 3a or 3b CKD (H)  Chronic, stable. The current medical regimen is effective;  continue present plan and medications.   - Albumin Random Urine Quantitative with Creat Ratio; Future  - Hemoglobin; Future  - BASIC METABOLIC PANEL; Future  - Albumin Random Urine Quantitative with Creat Ratio  - Hemoglobin  - BASIC METABOLIC PANEL    Hyperlipidemia LDL goal <100  Chronic, stable. The current medical regimen is effective;  continue present plan and medications.   - Lipid panel reflex to direct LDL Non-fasting; Future  - atorvastatin (LIPITOR) 20 MG tablet; Take 1 tablet (20 mg) by mouth daily  - Lipid panel reflex to direct LDL Non-fasting    Recurrent inguinal hernia of left side without obstruction or gangrene  Dav is a 73-year-old male with a history of previous left inguinal hernia repaired laparoscopically 20 years ago through [facility].  He believes this was done with mesh.  He has had recurrence of the inguinal hernia over the last year and is more symptomatic now.  He has a large inguinal bulge which is not reducible.  It does not extend into the scrotum.  His bowels remain normal without constipation.  He is up-to-date with colonoscopy most recent completed in 2019 and normal.    On exam he has a large indirect left inguinal hernia which is partially reducible.  There is no testicular mass.    Recurrent left sided inguinal hernia without obstruction or gangrene.  Will refer to general surgery for surgical intervention.  - Adult General Surg Referral; Future    Hypertension goal BP (blood pressure) < 140/90  Chronic, controlled. The current medical regimen is effective;  continue present plan and medications.   - lisinopril (ZESTRIL) 5 MG tablet; Take 1 tablet (5 mg) by mouth daily  - metoprolol succinate ER (TOPROL XL) 50 MG 24 hr tablet; Take 1 tablet (50 mg) by mouth daily    Atrial fibrillation with RVR (H)  Distant post  "op episode >10 years ago without recurrence. The current medical regimen is effective;  continue present plan and medications.   - metoprolol succinate ER (TOPROL XL) 50 MG 24 hr tablet; Take 1 tablet (50 mg) by mouth daily             BMI:   Estimated body mass index is 30.96 kg/m  as calculated from the following:    Height as of 11/9/23: 1.803 m (5' 11\").    Weight as of this encounter: 100.7 kg (222 lb).       CONSULTATION/REFERRAL to general surgery  FUTURE APPOINTMENTS:       - Follow-up for annual visit or as needed        Rayn Perera MD  [facility]    Joshua Demarco is a 73 year old, presenting for the following health issues:  Recheck Medication and Hernia      12/13/2023    12:53 PM   Additional Questions   Roomed by Syl Johnson       History of Present Illness       Reason for visit:  Hernia    He eats 2-3 servings of fruits and vegetables daily.He consumes 0 sweetened beverage(s) daily.He exercises with enough effort to increase his heart rate 30 to 60 minutes per day.  He exercises with enough effort to increase his heart rate 5 days per week.   He is taking medications regularly.         Patient Active Problem List   Diagnosis    CARDIOVASCULAR SCREENING; LDL GOAL LESS THAN 160    Hypertension goal BP (blood pressure) < 140/90    Advance Care Planning    Insomnia, transient    Carpal tunnel syndrome of left wrist    Carpal tunnel syndrome of right wrist    CKD (chronic kidney disease) stage 3, GFR 30-59 ml/min (H)    Atrial fibrillation with RVR (H)    Hyperlipidemia LDL goal <100     Current Outpatient Medications   Medication Sig Dispense Refill    aspirin (ASA) 81 MG EC tablet Take 81 mg by mouth      atorvastatin (LIPITOR) 20 MG tablet Take 1 tablet (20 mg) by mouth daily 90 tablet 3    lisinopril (ZESTRIL) 5 MG tablet Take 1 tablet (5 mg) by mouth daily 90 tablet 3    melatonin 1 MG/ML LIQD liquid Take 5 mg by mouth      metoprolol succinate ER (TOPROL XL) 50 " MG 24 hr tablet Take 1 tablet (50 mg) by mouth daily 90 tablet 3    Probiotic Product (PROBIOTIC DAILY PO)                Review of Systems   CONSTITUTIONAL: NEGATIVE for fever, chills, change in weight  ENT/MOUTH: NEGATIVE for ear, mouth and throat problems  RESP: NEGATIVE for significant cough or SOB  CV: NEGATIVE for chest pain, palpitations or peripheral edema  GI: NEGATIVE for nausea, abdominal pain, heartburn, or change in bowel habits  : positive for left inguinal hernia  ROS otherwise negative      Objective    /84   Pulse (!) 46   Temp 97.7  F (36.5  C) (Temporal)   Resp 16   Wt 100.7 kg (222 lb)   SpO2 99%   BMI 30.96 kg/m    Body mass index is 30.96 kg/m .  Physical Exam   GENERAL: healthy, alert and no distress  EYES: Eyes grossly normal to inspection, PERRL and conjunctivae and sclerae normal  HENT: ear canals and TM's normal, nose and mouth without ulcers or lesions  NECK: no adenopathy, no asymmetry, masses, or scars and thyroid normal to palpation  RESP: lungs clear to auscultation - no rales, rhonchi or wheezes  CV: regular rate and rhythm, normal S1 S2, no S3 or S4, no murmur, click or rub, no peripheral edema and peripheral pulses strong  ABDOMEN: soft, nontender, no hepatosplenomegaly, no masses and bowel sounds normal   (male): testicles normal without atrophy or masses, hernia large left inguinal, and penis normal without urethral discharge  MS: no gross musculoskeletal defects noted, no edema  SKIN: no suspicious lesions or rashes  NEURO: Normal strength and tone, mentation intact and speech normal  PSYCH: mentation appears normal, affect normal/bright

## 2023-12-14 ENCOUNTER — OFFICE VISIT (OUTPATIENT)
Dept: SURGERY | Facility: CLINIC | Age: 73
End: 2023-12-14
Payer: COMMERCIAL

## 2023-12-14 ENCOUNTER — TELEPHONE (OUTPATIENT)
Dept: SURGERY | Facility: CLINIC | Age: 73
End: 2023-12-14

## 2023-12-14 VITALS
SYSTOLIC BLOOD PRESSURE: 130 MMHG | WEIGHT: 222 LBS | HEIGHT: 71 IN | BODY MASS INDEX: 31.08 KG/M2 | TEMPERATURE: 96.2 F | DIASTOLIC BLOOD PRESSURE: 80 MMHG

## 2023-12-14 DIAGNOSIS — K40.31 RECURRENT UNILATERAL INGUINAL HERNIA WITH INCARCERATION: Primary | ICD-10-CM

## 2023-12-14 DIAGNOSIS — K40.91 RECURRENT INGUINAL HERNIA OF LEFT SIDE WITHOUT OBSTRUCTION OR GANGRENE: ICD-10-CM

## 2023-12-14 PROCEDURE — 99204 OFFICE O/P NEW MOD 45 MIN: CPT | Performed by: SPECIALIST

## 2023-12-14 ASSESSMENT — PAIN SCALES - GENERAL: PAINLEVEL: NO PAIN (0)

## 2023-12-14 NOTE — LETTER
12/14/2023         RE: Dav Seay  [address]  [facility] [address]        Dear Colleague,    Thank you for referring your patient, Dav Seay, to the [facility]. Please see a copy of my visit note below.    Consult requested by Dr. Perera    Reason for consultation: Recurrent left inguinal hernia    HPI:  This is a 73-year-old gentleman who had a laparoscopic preperitoneal bilateral inguinal hernia repair done in 2006.  About a year ago he developed a new bulge in the left groin.  It is now causing him pain.  He is thinking like to have it repaired.  Denies any nausea vomiting fevers chills or obstructive symptoms.  He was seen a year ago by another surgeon but opted to observe it at that time.  He now presents to me for reevaluation of his left inguinal hernia.    Past Medical History:   Diagnosis Date     Arthritis 2007     Hypertension      Traumatic amputation of other finger(s) (complete) (partial), without mention of complication 1975    Amputated RIght 2nd finger     Past Surgical History:   Procedure Laterality Date     COLONOSCOPY  6/10/2013    Procedure: COLONOSCOPY;  Colonoscopy;  Surgeon: Wes Pablo MD;  Location:  GI     COLONOSCOPY N/A 5/20/2019    Procedure: COLONOSCOPY;  Surgeon: Jesus Harris MD;  Location:  GI     HC REMOVAL OF TONSILS,<11 Y/O  5 years old    Tonsils <14y.o.     HERNIA REPAIR, INGUINAL RT/LT       JOINT REPLACEMENT, HIP RT/LT  12/19/11    Left total hip arthroplasty. [facility]     JOINT REPLACEMENT, HIP RT/LT  12/18/2017    Right tota hip arthroplasty. [facility]     RELEASE CARPAL TUNNEL Left 10/18/2017    Procedure: RELEASE CARPAL TUNNEL;  Left Carpal Tunnel Release;  Surgeon: Norris Townsend MD;  Location:  OR     [facility] COLONOSCOPY THRU STOMA W BIOPSY/CAUTERY TUMOR/POLYP/LESION  2003    hyperplastic polyp repeat in 2008.     Current Outpatient Medications   Medication     aspirin (ASA) 81  MG EC tablet     atorvastatin (LIPITOR) 20 MG tablet     lisinopril (ZESTRIL) 5 MG tablet     melatonin 1 MG/ML LIQD liquid     metoprolol succinate ER (TOPROL XL) 50 MG 24 hr tablet     Probiotic Product (PROBIOTIC DAILY PO)     No current facility-administered medications for this visit.        Allergies   Allergen Reactions     No Known Drug Allergy      Social History     Socioeconomic History     Marital status:      Spouse name: Lily     Number of children: 3     Years of education: Not on file     Highest education level: Not on file   Occupational History     Occupation: contractor   Tobacco Use     Smoking status: Never     Smokeless tobacco: Never   Vaping Use     Vaping Use: Never used   Substance and Sexual Activity     Alcohol use: Yes     Comment: occasional     Drug use: No     Sexual activity: Yes     Partners: Female   Other Topics Concern      Service Not Asked     Blood Transfusions Not Asked     Caffeine Concern Not Asked     Occupational Exposure Not Asked     Hobby Hazards Not Asked     Sleep Concern Not Asked     Stress Concern Not Asked     Weight Concern Not Asked     Special Diet Not Asked     Back Care Not Asked     Exercise Not Asked     Bike Helmet Not Asked     Seat Belt Not Asked     Self-Exams Not Asked     Parent/sibling w/ CABG, MI or angioplasty before 65F 55M? No   Social History Narrative     Not on file     Social Determinants of Health     Financial Resource Strain: Low Risk  (12/6/2023)    Financial Resource Strain      Within the past 12 months, have you or your family members you live with been unable to get utilities (heat, electricity) when it was really needed?: No   Food Insecurity: Low Risk  (12/6/2023)    Food Insecurity      Within the past 12 months, did you worry that your food would run out before you got money to buy more?: No      Within the past 12 months, did the food you bought just not last and you didn t have money to get more?: No    Transportation Needs: Low Risk  (12/6/2023)    Transportation Needs      Within the past 12 months, has lack of transportation kept you from medical appointments, getting your medicines, non-medical meetings or appointments, work, or from getting things that you need?: No   Physical Activity: Not on file   Stress: Not on file   Social Connections: Not on file   Interpersonal Safety: Low Risk  (12/13/2023)    Interpersonal Safety      Do you feel physically and emotionally safe where you currently live?: Yes      Within the past 12 months, have you been hit, slapped, kicked or otherwise physically hurt by someone?: No      Within the past 12 months, have you been humiliated or emotionally abused in other ways by your partner or ex-partner?: No   Housing Stability: Low Risk  (12/6/2023)    Housing Stability      Do you have housing? : Yes      Are you worried about losing your housing?: No     Family History   Problem Relation Age of Onset     Heart Disease Mother         Tachycardia     Osteoporosis Mother      Diabetes Father      Cancer Father         skin, lymph and colon CA     Colon Cancer Father      Arthritis Brother      Anesthesia Reaction No family hx of       ROS: 10 point ROS neg other than the symptoms noted above in the HPI.    PE:  B/P: 130/80, T: 96.2, P: Data Unavailable, R: Data Unavailable  General: well developed, well nourished WM who appears their stated age  HEENT: NC/AT, EOMI, (-)icterus, (-)injection  Neck: Supple, No JVD  Chest: CTA  Heart: S1, S2, (-)m/r/g  Abd: Soft, tender partially reducible left inguinal hernia.,  No right inguinal hernia non distended,  no masses  Ext; Warm, no edema  Psych: AAOx3  Neuro: No focal deficits      Impression/plan:  This is 73-year-old gentleman with an incarcerated recurrent left inguinal hernia-only partially reducible.  As he has had a previous preperitoneal repair I recommended an open repair.  The patient is in agreement with that plan.  After  discussion the patient plan this time is for an open left inguinal hernia.   The procedure, risks, benefits, and alternatives were discussed and the patient agrees to proceed.  The patient knows to go to the ER should he have increasing pain, nausea, vomiting or not be able to partially reduce the hernia in the evening.  He will be scheduled in the near future.    Lazaro Gordon MD, FACS      Again, thank you for allowing me to participate in the care of your patient.        Sincerely,        Lazaro Gordon MD

## 2023-12-14 NOTE — PROGRESS NOTES
Consult requested by Dr. Perera    Reason for consultation: Recurrent left inguinal hernia    HPI:  This is a 73-year-old gentleman who had a laparoscopic preperitoneal bilateral inguinal hernia repair done in 2006.  About a year ago he developed a new bulge in the left groin.  It is now causing him pain.  He is thinking like to have it repaired.  Denies any nausea vomiting fevers chills or obstructive symptoms.  He was seen a year ago by another surgeon but opted to observe it at that time.  He now presents to me for reevaluation of his left inguinal hernia.    Past Medical History:   Diagnosis Date    Arthritis 2007    Hypertension     Traumatic amputation of other finger(s) (complete) (partial), without mention of complication 1975    Amputated RIght 2nd finger     Past Surgical History:   Procedure Laterality Date    COLONOSCOPY  6/10/2013    Procedure: COLONOSCOPY;  Colonoscopy;  Surgeon: Wes Pablo MD;  Location:  GI    COLONOSCOPY N/A 5/20/2019    Procedure: COLONOSCOPY;  Surgeon: Jesus Harris MD;  Location:  GI    HC REMOVAL OF TONSILS,<11 Y/O  5 years old    Tonsils <14y.o.    HERNIA REPAIR, INGUINAL RT/LT      JOINT REPLACEMENT, HIP RT/LT  12/19/11    Left total hip arthroplasty. [facility]    JOINT REPLACEMENT, HIP RT/LT  12/18/2017    Right tota hip arthroplasty. [facility]    RELEASE CARPAL TUNNEL Left 10/18/2017    Procedure: RELEASE CARPAL TUNNEL;  Left Carpal Tunnel Release;  Surgeon: Norris oTwnsend MD;  Location:  OR    [facility] COLONOSCOPY THRU STOMA W BIOPSY/CAUTERY TUMOR/POLYP/LESION  2003    hyperplastic polyp repeat in 2008.     Current Outpatient Medications   Medication    aspirin (ASA) 81 MG EC tablet    atorvastatin (LIPITOR) 20 MG tablet    lisinopril (ZESTRIL) 5 MG tablet    melatonin 1 MG/ML LIQD liquid    metoprolol succinate ER (TOPROL XL) 50 MG 24 hr tablet    Probiotic Product (PROBIOTIC DAILY PO)     No current facility-administered  medications for this visit.        Allergies   Allergen Reactions    No Known Drug Allergy      Social History     Socioeconomic History    Marital status:      Spouse name: Lily    Number of children: 3    Years of education: Not on file    Highest education level: Not on file   Occupational History    Occupation: contractor   Tobacco Use    Smoking status: Never    Smokeless tobacco: Never   Vaping Use    Vaping Use: Never used   Substance and Sexual Activity    Alcohol use: Yes     Comment: occasional    Drug use: No    Sexual activity: Yes     Partners: Female   Other Topics Concern     Service Not Asked    Blood Transfusions Not Asked    Caffeine Concern Not Asked    Occupational Exposure Not Asked    Hobby Hazards Not Asked    Sleep Concern Not Asked    Stress Concern Not Asked    Weight Concern Not Asked    Special Diet Not Asked    Back Care Not Asked    Exercise Not Asked    Bike Helmet Not Asked    Seat Belt Not Asked    Self-Exams Not Asked    Parent/sibling w/ CABG, MI or angioplasty before 65F 55M? No   Social History Narrative    Not on file     Social Determinants of Health     Financial Resource Strain: Low Risk  (12/6/2023)    Financial Resource Strain     Within the past 12 months, have you or your family members you live with been unable to get utilities (heat, electricity) when it was really needed?: No   Food Insecurity: Low Risk  (12/6/2023)    Food Insecurity     Within the past 12 months, did you worry that your food would run out before you got money to buy more?: No     Within the past 12 months, did the food you bought just not last and you didn t have money to get more?: No   Transportation Needs: Low Risk  (12/6/2023)    Transportation Needs     Within the past 12 months, has lack of transportation kept you from medical appointments, getting your medicines, non-medical meetings or appointments, work, or from getting things that you need?: No   Physical Activity: Not on  file   Stress: Not on file   Social Connections: Not on file   Interpersonal Safety: Low Risk  (12/13/2023)    Interpersonal Safety     Do you feel physically and emotionally safe where you currently live?: Yes     Within the past 12 months, have you been hit, slapped, kicked or otherwise physically hurt by someone?: No     Within the past 12 months, have you been humiliated or emotionally abused in other ways by your partner or ex-partner?: No   Housing Stability: Low Risk  (12/6/2023)    Housing Stability     Do you have housing? : Yes     Are you worried about losing your housing?: No     Family History   Problem Relation Age of Onset    Heart Disease Mother         Tachycardia    Osteoporosis Mother     Diabetes Father     Cancer Father         skin, lymph and colon CA    Colon Cancer Father     Arthritis Brother     Anesthesia Reaction No family hx of       ROS: 10 point ROS neg other than the symptoms noted above in the HPI.    PE:  B/P: 130/80, T: 96.2, P: Data Unavailable, R: Data Unavailable  General: well developed, well nourished WM who appears their stated age  HEENT: NC/AT, EOMI, (-)icterus, (-)injection  Neck: Supple, No JVD  Chest: CTA  Heart: S1, S2, (-)m/r/g  Abd: Soft, tender partially reducible left inguinal hernia.,  No right inguinal hernia non distended,  no masses  Ext; Warm, no edema  Psych: AAOx3  Neuro: No focal deficits      Impression/plan:  This is 73-year-old gentleman with an incarcerated recurrent left inguinal hernia-only partially reducible.  As he has had a previous preperitoneal repair I recommended an open repair.  The patient is in agreement with that plan.  After discussion the patient plan this time is for an open left inguinal hernia.   The procedure, risks, benefits, and alternatives were discussed and the patient agrees to proceed.  The patient knows to go to the ER should he have increasing pain, nausea, vomiting or not be able to partially reduce the hernia in the  evening.  He will be scheduled in the near future.    Lazaro Gordon MD, FACS

## 2023-12-14 NOTE — TELEPHONE ENCOUNTER
Type of surgery: HERNIORRHAPHY, INGUINAL, OPEN (Left)   Location of surgery: [facility]  Date and time of surgery: 1/10  Surgeon: Evan  Pre-Op Appt Date: 12/26  Post-Op Appt Date: 1/18   Packet sent out: Yes  Pre-cert/Authorization completed:  Not Applicable  Date: na

## 2023-12-26 ENCOUNTER — OFFICE VISIT (OUTPATIENT)
Dept: FAMILY MEDICINE | Facility: CLINIC | Age: 73
End: 2023-12-26
Payer: COMMERCIAL

## 2023-12-26 VITALS
OXYGEN SATURATION: 96 % | HEART RATE: 55 BPM | WEIGHT: 220 LBS | SYSTOLIC BLOOD PRESSURE: 118 MMHG | HEIGHT: 71 IN | RESPIRATION RATE: 18 BRPM | BODY MASS INDEX: 30.8 KG/M2 | DIASTOLIC BLOOD PRESSURE: 78 MMHG | TEMPERATURE: 98.2 F

## 2023-12-26 DIAGNOSIS — I10 HYPERTENSION GOAL BP (BLOOD PRESSURE) < 140/90: ICD-10-CM

## 2023-12-26 DIAGNOSIS — I48.91 ATRIAL FIBRILLATION WITH RVR (H): ICD-10-CM

## 2023-12-26 DIAGNOSIS — E78.5 HYPERLIPIDEMIA LDL GOAL <100: ICD-10-CM

## 2023-12-26 DIAGNOSIS — K40.91 RECURRENT INGUINAL HERNIA OF LEFT SIDE WITHOUT OBSTRUCTION OR GANGRENE: ICD-10-CM

## 2023-12-26 DIAGNOSIS — Z01.818 PREOP GENERAL PHYSICAL EXAM: Primary | ICD-10-CM

## 2023-12-26 DIAGNOSIS — N18.31 STAGE 3A CHRONIC KIDNEY DISEASE (H): ICD-10-CM

## 2023-12-26 PROCEDURE — 99214 OFFICE O/P EST MOD 30 MIN: CPT | Performed by: NURSE PRACTITIONER

## 2023-12-26 ASSESSMENT — PAIN SCALES - GENERAL: PAINLEVEL: NO PAIN (0)

## 2023-12-26 NOTE — PATIENT INSTRUCTIONS
Instructed patient to cancel surgery and reschedule if develops high fever or is vomiting 1-3 days before surgery. Pre Operative History and Physical is good for 30 days.    Other Pre-Op Orders for when to stop eating the night before surgery, time of surgery, where & when to check in, parking, and any other specific pre-operative orders come from the surgeon's office. You should hear from them at least one day before surgery if not sooner for these specific instructions.    STOP any types of over the counter blood thinning medications (such as aspirin, Motrin/ibuprofen, Aleve/naproxen, vitamin E, or fish oil) 1 week prior to surgery. Tylenol (or acetaminophen) is okay to take for pain if needed    Recommend no alcohol consumption at least 48 hours prior to surgery    TAKE the following medications the AM of surgery with small sip of water (blood pressure, heart or anti-seizure medications):

## 2023-12-26 NOTE — PROGRESS NOTES
[address] [address]  Phone: [phone]  Fax: [phone]  Primary Provider: Ryan Perera  Pre-op Performing Provider: FANG ROBERTS      PREOPERATIVE EVALUATION:  Today's date: 12/26/2023    Dav is a 73 year old, presenting for the following:  Pre-Op Exam (Hernia surgery )        12/26/2023     8:25 AM   Additional Questions   Roomed by Darby       Surgical Information:  Surgery/Procedure: hernia repair  Surgery Location: [facility]   Surgeon: Dr. Gordon  Surgery Date: 1/10/24  Time of Surgery: n/a   Where patient plans to recover: At home with family  Fax number for surgical facility: Note does not need to be faxed, will be available electronically in Epic.    Assessment & Plan     The proposed surgical procedure is considered INTERMEDIATE risk.    Preop general physical exam    Recurrent inguinal hernia of left side without obstruction or gangrene    Stage 3a chronic kidney disease (H)  BMP on 12/13/23 with stable creatinine at 1.47.     Atrial fibrillation with RVR (H)  No current symptoms, heart rate regular today. ECHO completed 6/1/23.     Hypertension goal BP (blood pressure) < 140/90  Blood pressure at goal, medications reviewed.     Hyperlipidemia LDL goal <100         - No identified additional risk factors other than previously addressed    Antiplatelet or Anticoagulation Medication Instructions:   - aspirin: Discontinue aspirin 7-10 days prior to procedure to reduce bleeding risk. It should be resumed postoperatively.     Additional Medication Instructions:  Instructed to take Metoprolol the morning of surgery, all other medications can be held.     RECOMMENDATION:  APPROVAL GIVEN to proceed with proposed procedure, without further diagnostic evaluation.            Subjective       HPI related to upcoming procedure:     Dav has a history of left sided inguinal hernia, previously repaired at [facility] over 15  years ago. Over the past several month he has noticed more bulging in the area and occasional pain. He was evaluated by Dr. Mills in general surgery who recommended repeat repair.     Dav has a past medical history of intermittent atrial fibrillation last occurring over 10 years ago per his report, currently taking ASA. He is also treated for hypertension with lisinopril and metoprolol. He is also taking atorvastatin daily for hyperlipidemia. He last had lab work evaluated on 12/13/23.         12/21/2023     2:39 AM   Preop Questions   1. Have you ever had a heart attack or stroke? No   2. Have you ever had surgery on your heart or blood vessels, such as a stent placement, a coronary artery bypass, or surgery on an artery in your head, neck, heart, or legs? No   3. Do you have chest pain with activity? No   4. Do you have a history of  heart failure? No   5. Do you currently have a cold, bronchitis or symptoms of other infection? No   6. Do you have a cough, shortness of breath, or wheezing? No   7. Do you or anyone in your family have previous history of blood clots? No   8. Do you or does anyone in your family have a serious bleeding problem such as prolonged bleeding following surgeries or cuts? No   9. Have you ever had problems with anemia or been told to take iron pills? No   10. Have you had any abnormal blood loss such as black, tarry or bloody stools? No   11. Have you ever had a blood transfusion? No   12. Are you willing to have a blood transfusion if it is medically needed before, during, or after your surgery? Yes   13. Have you or any of your relatives ever had problems with anesthesia? No   14. Do you have sleep apnea, excessive snoring or daytime drowsiness? YES - Using CPAP nightly    14a. Do you have a CPAP machine? Yes   15. Do you have any artifical heart valves or other implanted medical devices like a pacemaker, defibrillator, or continuous glucose monitor? No   16. Do you have artificial  joints? YES - Bilateral hip replacements    17. Are you allergic to latex? No       Health Care Directive:  Patient has a Health Care Directive on file      Preoperative Review of :   reviewed - no record of controlled substances prescribed.          Review of Systems  CONSTITUTIONAL: NEGATIVE for fever, chills, change in weight  INTEGUMENTARY/SKIN: NEGATIVE for worrisome rashes, moles or lesions  EYES: NEGATIVE for vision changes or irritation  ENT/MOUTH: NEGATIVE for ear, mouth and throat problems  RESP: NEGATIVE for significant cough or SOB  CV: NEGATIVE for chest pain, palpitations or peripheral edema  GI: NEGATIVE for nausea, abdominal pain, heartburn, or change in bowel habits  : NEGATIVE for frequency, dysuria, or hematuria  MUSCULOSKELETAL: NEGATIVE for significant arthralgias or myalgia  NEURO: NEGATIVE for weakness, dizziness or paresthesias  ENDOCRINE: NEGATIVE for temperature intolerance, skin/hair changes  HEME: NEGATIVE for bleeding problems  PSYCHIATRIC: NEGATIVE for changes in mood or affect    Patient Active Problem List    Diagnosis Date Noted    Hyperlipidemia LDL goal <100 12/14/2022     Priority: Medium    Atrial fibrillation with RVR (H) 10/25/2021     Priority: Medium    CKD (chronic kidney disease) stage 3, GFR 30-59 ml/min (H) 01/08/2018     Priority: Medium    Carpal tunnel syndrome of left wrist 07/25/2017     Priority: Medium    Carpal tunnel syndrome of right wrist 07/25/2017     Priority: Medium    Insomnia, transient 12/02/2013     Priority: Medium    Advance Care Planning 11/30/2012     Priority: Medium     Advance Care Planning 9/16/2015: Receipt of ACP document:  Received: Health Care Directive which was witnessed or notarized on 8/29/15.  Document not previously scanned.  Validation form completed and sent with document to be scanned.  Code Status needs to be updated to reflect choices in most recent ACP document. Confirmed/documented designated decision maker(s).  Added by  Tara Davis RN, BSN, MA, Advance Care Planning Liaison.  Advance Care Planning 11/30/12: Patient states has Advance Directive and will bring in a copy to clinic. 11/30/2012  Henri/FLOR         Hypertension goal BP (blood pressure) < 140/90 02/15/2012     Priority: Medium    CARDIOVASCULAR SCREENING; LDL GOAL LESS THAN 160 10/31/2010     Priority: Medium      Past Medical History:   Diagnosis Date    Arthritis 2007    Hypertension     Traumatic amputation of other finger(s) (complete) (partial), without mention of complication 1975    Amputated RIght 2nd finger     Past Surgical History:   Procedure Laterality Date    COLONOSCOPY  6/10/2013    Procedure: COLONOSCOPY;  Colonoscopy;  Surgeon: Wes Pablo MD;  Location:  GI    COLONOSCOPY N/A 5/20/2019    Procedure: COLONOSCOPY;  Surgeon: Jesus Harris MD;  Location:  GI    HC REMOVAL OF TONSILS,<11 Y/O  5 years old    Tonsils <14y.o.    HERNIA REPAIR, INGUINAL RT/LT      JOINT REPLACEMENT, HIP RT/LT  12/19/11    Left total hip arthroplasty. [facility]    JOINT REPLACEMENT, HIP RT/LT  12/18/2017    Right tota hip arthroplasty. [facility]    RELEASE CARPAL TUNNEL Left 10/18/2017    Procedure: RELEASE CARPAL TUNNEL;  Left Carpal Tunnel Release;  Surgeon: Norris Townsend MD;  Location:  OR    [facility] COLONOSCOPY THRU STOMA W BIOPSY/CAUTERY TUMOR/POLYP/LESION  2003    hyperplastic polyp repeat in 2008.     Current Outpatient Medications   Medication Sig Dispense Refill    aspirin (ASA) 81 MG EC tablet Take 81 mg by mouth      atorvastatin (LIPITOR) 20 MG tablet Take 1 tablet (20 mg) by mouth daily 90 tablet 3    lisinopril (ZESTRIL) 5 MG tablet Take 1 tablet (5 mg) by mouth daily 90 tablet 3    melatonin 1 MG/ML LIQD liquid Take 5 mg by mouth      metoprolol succinate ER (TOPROL XL) 50 MG 24 hr tablet Take 1 tablet (50 mg) by mouth daily 90 tablet 3    Probiotic Product (PROBIOTIC DAILY PO)          Allergies   Allergen  Reactions    No Known Drug Allergy         Social History     Tobacco Use    Smoking status: Never    Smokeless tobacco: Never   Substance Use Topics    Alcohol use: Yes     Comment: occasional       History   Drug Use No         Objective     There were no vitals taken for this visit.    Physical Exam    GENERAL APPEARANCE: healthy, alert and no distress     EYES: EOMI,  PERRL     HENT: ear canals and TM's normal and nose and mouth without ulcers or lesions     NECK: no adenopathy, no asymmetry, masses, or scars and thyroid normal to palpation     RESP: lungs clear to auscultation - no rales, rhonchi or wheezes     CV: regular rates and rhythm, normal S1 S2, no S3 or S4 and no murmur, click or rub     ABDOMEN:  soft, nontender, no HSM or masses and bowel sounds normal     MS: extremities normal- no gross deformities noted, no evidence of inflammation in joints, FROM in all extremities.     SKIN: no suspicious lesions or rashes     NEURO: Normal strength and tone, sensory exam grossly normal, mentation intact and speech normal     PSYCH: mentation appears normal. and affect normal/bright     LYMPHATICS: No cervical adenopathy    Recent Labs   Lab Test 12/13/23  1321 12/16/22  0826 11/02/22  0805   HGB 15.6  --  15.9    141  --    POTASSIUM 4.5 4.5  --    CR 1.47* 1.50*  --         Diagnostics:  No labs were ordered during this visit.   No EKG required, no history of coronary heart disease, significant arrhythmia, peripheral arterial disease or other structural heart disease.    ECHO completed 6/1/23:  Final Impressions   1. Normal left ventricular chamber size.  Calculated ejection fraction 60%. No regional wall   motion abnormalities.   2. Normal left ventricular filling pressure.   3. Normal right ventricular chamber size, normal RV systolic function. Estimated RV systolic   pressure 32 mmHg.   4. Mildly enlarged sinus of Valsalva diameter (diameter 44 mm); upper normal 43 mm for age and   BSA.   5. Normal  inferior vena cava size with normal inspiratory collapse (>50%).   6. Normal left atrial size.   7. No pericardial effusion.     Revised Cardiac Risk Index (RCRI):  The patient has the following serious cardiovascular risks for perioperative complications:   - No serious cardiac risks = 0 points     RCRI Interpretation: 0 points: Class I (very low risk - 0.4% complication rate)         Signed Electronically by: Batsheva De Luna NP  Copy of this evaluation report is provided to requesting physician.

## 2023-12-27 NOTE — TELEPHONE ENCOUNTER
Imaging Received  January 3, 2024 10:39 AM ABT   Action: Images from [facility] received and resolved to PACS.     RECORDS STATUS - ALL OTHER DIAGNOSIS      RECORDS RECEIVED FROM: [facility]   DATE RECEIVED:    NOTES STATUS DETAILS   OFFICE NOTE from referring provider Epic 11/27/23: Dr. Terence De Jesus   MEDICATION LIST [facility]    LABS     PATHOLOGY REPORTS     ANYTHING RELATED TO DIAGNOSIS Epic Most recent 12/13/23   IMAGING (NEED IMAGES & REPORT)     CT SCANS PACS 04/15/24: CT Chest  11/09/23 CT Shoulder   XRAYS PACS [facility]:  02/18/20: DX Chest

## 2024-01-05 RX ORDER — ACETAMINOPHEN 500 MG
500-1000 TABLET ORAL EVERY 6 HOURS PRN
COMMUNITY

## 2024-01-10 ENCOUNTER — HOSPITAL ENCOUNTER (OUTPATIENT)
Facility: CLINIC | Age: 74
Discharge: HOME | End: 2024-01-10
Attending: SPECIALIST | Admitting: SPECIALIST
Payer: MEDICARE

## 2024-01-10 ENCOUNTER — ANESTHESIA (OUTPATIENT)
Dept: SURGERY | Facility: CLINIC | Age: 74
End: 2024-01-10
Payer: MEDICARE

## 2024-01-10 ENCOUNTER — ANESTHESIA EVENT (OUTPATIENT)
Dept: SURGERY | Facility: CLINIC | Age: 74
End: 2024-01-10
Payer: MEDICARE

## 2024-01-10 VITALS
WEIGHT: 220 LBS | DIASTOLIC BLOOD PRESSURE: 72 MMHG | OXYGEN SATURATION: 96 % | BODY MASS INDEX: 30.68 KG/M2 | SYSTOLIC BLOOD PRESSURE: 114 MMHG | RESPIRATION RATE: 12 BRPM | HEART RATE: 50 BPM | TEMPERATURE: 97.9 F

## 2024-01-10 DIAGNOSIS — G89.18 POST-OP PAIN: Primary | ICD-10-CM

## 2024-01-10 PROCEDURE — C1781 MESH (IMPLANTABLE): HCPCS | Performed by: SPECIALIST

## 2024-01-10 PROCEDURE — 258N000003 HC RX IP 258 OP 636: Performed by: NURSE ANESTHETIST, CERTIFIED REGISTERED

## 2024-01-10 PROCEDURE — 999N000141 HC STATISTIC PRE-PROCEDURE NURSING ASSESSMENT: Performed by: SPECIALIST

## 2024-01-10 PROCEDURE — 710N000012 HC RECOVERY PHASE 2, PER MINUTE: Performed by: SPECIALIST

## 2024-01-10 PROCEDURE — 250N000011 HC RX IP 250 OP 636: Performed by: NURSE ANESTHETIST, CERTIFIED REGISTERED

## 2024-01-10 PROCEDURE — 250N000025 HC SEVOFLURANE, PER MIN: Performed by: SPECIALIST

## 2024-01-10 PROCEDURE — 370N000017 HC ANESTHESIA TECHNICAL FEE, PER MIN: Performed by: SPECIALIST

## 2024-01-10 PROCEDURE — 250N000009 HC RX 250: Performed by: NURSE ANESTHETIST, CERTIFIED REGISTERED

## 2024-01-10 PROCEDURE — 250N000009 HC RX 250: Performed by: SPECIALIST

## 2024-01-10 PROCEDURE — 272N000001 HC OR GENERAL SUPPLY STERILE: Performed by: SPECIALIST

## 2024-01-10 PROCEDURE — 250N000011 HC RX IP 250 OP 636: Performed by: SPECIALIST

## 2024-01-10 PROCEDURE — 250N000013 HC RX MED GY IP 250 OP 250 PS 637: Performed by: NURSE ANESTHETIST, CERTIFIED REGISTERED

## 2024-01-10 PROCEDURE — 710N000010 HC RECOVERY PHASE 1, LEVEL 2, PER MIN: Performed by: SPECIALIST

## 2024-01-10 PROCEDURE — 49521 REREPAIR ING HERNIA BLOCKED: CPT | Mod: LT | Performed by: SPECIALIST

## 2024-01-10 PROCEDURE — 271N000001 HC OR GENERAL SUPPLY NON-STERILE: Performed by: SPECIALIST

## 2024-01-10 PROCEDURE — 360N000075 HC SURGERY LEVEL 2, PER MIN: Performed by: SPECIALIST

## 2024-01-10 DEVICE — MESH PROGRIP 4.7X3" PARIETEX LEFT TEM1208GL: Type: IMPLANTABLE DEVICE | Site: GROIN | Status: FUNCTIONAL

## 2024-01-10 RX ORDER — HYDROXYZINE HYDROCHLORIDE 10 MG/1
10 TABLET, FILM COATED ORAL EVERY 6 HOURS PRN
Status: DISCONTINUED | OUTPATIENT
Start: 2024-01-10 | End: 2024-01-10 | Stop reason: HOSPADM

## 2024-01-10 RX ORDER — FENTANYL CITRATE 50 UG/ML
INJECTION, SOLUTION INTRAMUSCULAR; INTRAVENOUS PRN
Status: DISCONTINUED | OUTPATIENT
Start: 2024-01-10 | End: 2024-01-10

## 2024-01-10 RX ORDER — LIDOCAINE 40 MG/G
CREAM TOPICAL
Status: DISCONTINUED | OUTPATIENT
Start: 2024-01-10 | End: 2024-01-10 | Stop reason: HOSPADM

## 2024-01-10 RX ORDER — OXYCODONE HYDROCHLORIDE 5 MG/1
5 TABLET ORAL
Status: DISCONTINUED | OUTPATIENT
Start: 2024-01-10 | End: 2024-01-10 | Stop reason: HOSPADM

## 2024-01-10 RX ORDER — CEFAZOLIN SODIUM/WATER 2 G/20 ML
2 SYRINGE (ML) INTRAVENOUS
Status: COMPLETED | OUTPATIENT
Start: 2024-01-10 | End: 2024-01-10

## 2024-01-10 RX ORDER — HYDROMORPHONE HYDROCHLORIDE 1 MG/ML
0.2 INJECTION, SOLUTION INTRAMUSCULAR; INTRAVENOUS; SUBCUTANEOUS EVERY 5 MIN PRN
Status: DISCONTINUED | OUTPATIENT
Start: 2024-01-10 | End: 2024-01-10 | Stop reason: HOSPADM

## 2024-01-10 RX ORDER — DEXAMETHASONE SODIUM PHOSPHATE 10 MG/ML
INJECTION, SOLUTION INTRAMUSCULAR; INTRAVENOUS PRN
Status: DISCONTINUED | OUTPATIENT
Start: 2024-01-10 | End: 2024-01-10

## 2024-01-10 RX ORDER — ONDANSETRON 2 MG/ML
INJECTION INTRAMUSCULAR; INTRAVENOUS PRN
Status: DISCONTINUED | OUTPATIENT
Start: 2024-01-10 | End: 2024-01-10

## 2024-01-10 RX ORDER — FENTANYL CITRATE 50 UG/ML
25 INJECTION, SOLUTION INTRAMUSCULAR; INTRAVENOUS
Status: DISCONTINUED | OUTPATIENT
Start: 2024-01-10 | End: 2024-01-10 | Stop reason: HOSPADM

## 2024-01-10 RX ORDER — FENTANYL CITRATE 50 UG/ML
25 INJECTION, SOLUTION INTRAMUSCULAR; INTRAVENOUS EVERY 5 MIN PRN
Status: DISCONTINUED | OUTPATIENT
Start: 2024-01-10 | End: 2024-01-10 | Stop reason: HOSPADM

## 2024-01-10 RX ORDER — BUPIVACAINE HYDROCHLORIDE AND EPINEPHRINE 2.5; 5 MG/ML; UG/ML
INJECTION, SOLUTION INFILTRATION; PERINEURAL PRN
Status: DISCONTINUED | OUTPATIENT
Start: 2024-01-10 | End: 2024-01-10 | Stop reason: HOSPADM

## 2024-01-10 RX ORDER — SODIUM CHLORIDE, SODIUM LACTATE, POTASSIUM CHLORIDE, CALCIUM CHLORIDE 600; 310; 30; 20 MG/100ML; MG/100ML; MG/100ML; MG/100ML
INJECTION, SOLUTION INTRAVENOUS CONTINUOUS
Status: DISCONTINUED | OUTPATIENT
Start: 2024-01-10 | End: 2024-01-10 | Stop reason: HOSPADM

## 2024-01-10 RX ORDER — OXYCODONE AND ACETAMINOPHEN 5; 325 MG/1; MG/1
2 TABLET ORAL
Status: DISCONTINUED | OUTPATIENT
Start: 2024-01-10 | End: 2024-01-10 | Stop reason: HOSPADM

## 2024-01-10 RX ORDER — ONDANSETRON 2 MG/ML
4 INJECTION INTRAMUSCULAR; INTRAVENOUS EVERY 30 MIN PRN
Status: DISCONTINUED | OUTPATIENT
Start: 2024-01-10 | End: 2024-01-10 | Stop reason: HOSPADM

## 2024-01-10 RX ORDER — HALOPERIDOL 5 MG/ML
1 INJECTION INTRAMUSCULAR
Status: DISCONTINUED | OUTPATIENT
Start: 2024-01-10 | End: 2024-01-10 | Stop reason: HOSPADM

## 2024-01-10 RX ORDER — ACETAMINOPHEN 325 MG/1
975 TABLET ORAL EVERY 6 HOURS PRN
Status: DISCONTINUED | OUTPATIENT
Start: 2024-01-10 | End: 2024-01-10 | Stop reason: HOSPADM

## 2024-01-10 RX ORDER — DIMENHYDRINATE 50 MG/ML
25 INJECTION, SOLUTION INTRAMUSCULAR; INTRAVENOUS
Status: DISCONTINUED | OUTPATIENT
Start: 2024-01-10 | End: 2024-01-10 | Stop reason: HOSPADM

## 2024-01-10 RX ORDER — CEFAZOLIN SODIUM/WATER 2 G/20 ML
2 SYRINGE (ML) INTRAVENOUS SEE ADMIN INSTRUCTIONS
Status: DISCONTINUED | OUTPATIENT
Start: 2024-01-10 | End: 2024-01-10 | Stop reason: HOSPADM

## 2024-01-10 RX ORDER — PROPOFOL 10 MG/ML
INJECTION, EMULSION INTRAVENOUS PRN
Status: DISCONTINUED | OUTPATIENT
Start: 2024-01-10 | End: 2024-01-10

## 2024-01-10 RX ORDER — ONDANSETRON 4 MG/1
4 TABLET, ORALLY DISINTEGRATING ORAL EVERY 30 MIN PRN
Status: DISCONTINUED | OUTPATIENT
Start: 2024-01-10 | End: 2024-01-10 | Stop reason: HOSPADM

## 2024-01-10 RX ORDER — FENTANYL CITRATE 50 UG/ML
50 INJECTION, SOLUTION INTRAMUSCULAR; INTRAVENOUS EVERY 5 MIN PRN
Status: DISCONTINUED | OUTPATIENT
Start: 2024-01-10 | End: 2024-01-10 | Stop reason: HOSPADM

## 2024-01-10 RX ORDER — HYDROMORPHONE HYDROCHLORIDE 1 MG/ML
0.5 INJECTION, SOLUTION INTRAMUSCULAR; INTRAVENOUS; SUBCUTANEOUS EVERY 5 MIN PRN
Status: DISCONTINUED | OUTPATIENT
Start: 2024-01-10 | End: 2024-01-10 | Stop reason: HOSPADM

## 2024-01-10 RX ORDER — PROPOFOL 10 MG/ML
INJECTION, EMULSION INTRAVENOUS CONTINUOUS PRN
Status: DISCONTINUED | OUTPATIENT
Start: 2024-01-10 | End: 2024-01-10

## 2024-01-10 RX ORDER — EPHEDRINE SULFATE 50 MG/ML
INJECTION, SOLUTION INTRAMUSCULAR; INTRAVENOUS; SUBCUTANEOUS PRN
Status: DISCONTINUED | OUTPATIENT
Start: 2024-01-10 | End: 2024-01-10

## 2024-01-10 RX ORDER — ACETAMINOPHEN 325 MG/1
975 TABLET ORAL ONCE
Status: COMPLETED | OUTPATIENT
Start: 2024-01-10 | End: 2024-01-10

## 2024-01-10 RX ORDER — ALBUTEROL SULFATE 0.83 MG/ML
2.5 SOLUTION RESPIRATORY (INHALATION) EVERY 4 HOURS PRN
Status: DISCONTINUED | OUTPATIENT
Start: 2024-01-10 | End: 2024-01-10 | Stop reason: HOSPADM

## 2024-01-10 RX ORDER — LIDOCAINE HYDROCHLORIDE 10 MG/ML
INJECTION, SOLUTION INFILTRATION; PERINEURAL PRN
Status: DISCONTINUED | OUTPATIENT
Start: 2024-01-10 | End: 2024-01-10

## 2024-01-10 RX ORDER — OXYCODONE HYDROCHLORIDE 5 MG/1
5-10 TABLET ORAL EVERY 6 HOURS PRN
Qty: 10 TABLET | Refills: 0 | Status: SHIPPED | OUTPATIENT
Start: 2024-01-10 | End: 2024-01-18

## 2024-01-10 RX ORDER — OXYCODONE HYDROCHLORIDE 5 MG/1
10 TABLET ORAL
Status: COMPLETED | OUTPATIENT
Start: 2024-01-10 | End: 2024-01-10

## 2024-01-10 RX ADMIN — ROCURONIUM BROMIDE 20 MG: 50 INJECTION, SOLUTION INTRAVENOUS at 08:12

## 2024-01-10 RX ADMIN — ROCURONIUM BROMIDE 50 MG: 50 INJECTION, SOLUTION INTRAVENOUS at 07:32

## 2024-01-10 RX ADMIN — MIDAZOLAM 1 MG: 1 INJECTION INTRAMUSCULAR; INTRAVENOUS at 07:26

## 2024-01-10 RX ADMIN — FENTANYL CITRATE 50 MCG: 50 INJECTION INTRAMUSCULAR; INTRAVENOUS at 08:32

## 2024-01-10 RX ADMIN — DEXAMETHASONE SODIUM PHOSPHATE 5 MG: 10 INJECTION, SOLUTION INTRAMUSCULAR; INTRAVENOUS at 07:42

## 2024-01-10 RX ADMIN — SODIUM CHLORIDE, POTASSIUM CHLORIDE, SODIUM LACTATE AND CALCIUM CHLORIDE: 600; 310; 30; 20 INJECTION, SOLUTION INTRAVENOUS at 06:42

## 2024-01-10 RX ADMIN — Medication 10 MG: at 07:45

## 2024-01-10 RX ADMIN — FENTANYL CITRATE 50 MCG: 50 INJECTION INTRAMUSCULAR; INTRAVENOUS at 07:32

## 2024-01-10 RX ADMIN — PHENYLEPHRINE HYDROCHLORIDE 0.1 MCG/KG/MIN: 10 INJECTION INTRAVENOUS at 07:41

## 2024-01-10 RX ADMIN — SUGAMMADEX 200 MG: 100 INJECTION, SOLUTION INTRAVENOUS at 08:35

## 2024-01-10 RX ADMIN — OXYCODONE HYDROCHLORIDE 10 MG: 5 TABLET ORAL at 10:22

## 2024-01-10 RX ADMIN — FENTANYL CITRATE 25 MCG: 50 INJECTION, SOLUTION INTRAMUSCULAR; INTRAVENOUS at 09:29

## 2024-01-10 RX ADMIN — PROPOFOL 180 MG: 10 INJECTION, EMULSION INTRAVENOUS at 07:32

## 2024-01-10 RX ADMIN — LIDOCAINE HYDROCHLORIDE 50 MG: 10 INJECTION, SOLUTION INFILTRATION; PERINEURAL at 07:32

## 2024-01-10 RX ADMIN — ONDANSETRON 4 MG: 2 INJECTION INTRAMUSCULAR; INTRAVENOUS at 08:33

## 2024-01-10 RX ADMIN — Medication 2 G: at 07:25

## 2024-01-10 RX ADMIN — LIDOCAINE HYDROCHLORIDE 0.5 ML: 10 INJECTION, SOLUTION EPIDURAL; INFILTRATION; INTRACAUDAL; PERINEURAL at 06:42

## 2024-01-10 RX ADMIN — PROPOFOL 100 MCG/KG/MIN: 10 INJECTION, EMULSION INTRAVENOUS at 07:39

## 2024-01-10 RX ADMIN — ACETAMINOPHEN 975 MG: 325 TABLET, FILM COATED ORAL at 06:41

## 2024-01-10 ASSESSMENT — ACTIVITIES OF DAILY LIVING (ADL)
ADLS_ACUITY_SCORE: 20

## 2024-01-10 ASSESSMENT — ENCOUNTER SYMPTOMS: DYSRHYTHMIAS: 1

## 2024-01-10 NOTE — ANESTHESIA PREPROCEDURE EVALUATION
Anesthesia Pre-Procedure Evaluation    Patient: Dav Seay   MRN: [id number] : 1950        Procedure : Procedure(s):  HERNIORRHAPHY, INGUINAL, OPEN          Past Medical History:   Diagnosis Date    Arthritis     Hypertension     Traumatic amputation of other finger(s) (complete) (partial), without mention of complication     Amputated RIght 2nd finger      Past Surgical History:   Procedure Laterality Date    COLONOSCOPY  6/10/2013    Procedure: COLONOSCOPY;  Colonoscopy;  Surgeon: Wes Pablo MD;  Location:  GI    COLONOSCOPY N/A 2019    Procedure: COLONOSCOPY;  Surgeon: Jesus Harris MD;  Location:  GI    HC REMOVAL OF TONSILS,<11 Y/O  5 years old    Tonsils <14y.o.    HERNIA REPAIR, INGUINAL RT/LT      JOINT REPLACEMENT, HIP RT/LT  11    Left total hip arthroplasty. [facility]    JOINT REPLACEMENT, HIP RT/LT  2017    Right tota hip arthroplasty. [facility]    RELEASE CARPAL TUNNEL Left 10/18/2017    Procedure: RELEASE CARPAL TUNNEL;  Left Carpal Tunnel Release;  Surgeon: Norris Townsend MD;  Location:  OR    [facility] COLONOSCOPY THRU STOMA W BIOPSY/CAUTERY TUMOR/POLYP/LESION  2003    hyperplastic polyp repeat in .      Allergies   Allergen Reactions    No Known Drug Allergy       Social History     Tobacco Use    Smoking status: Never    Smokeless tobacco: Never   Substance Use Topics    Alcohol use: Yes     Comment: occasional      Wt Readings from Last 1 Encounters:   01/10/24 99.8 kg (220 lb)        Anesthesia Evaluation   Pt has had prior anesthetic. Type: MAC and General.    No history of anesthetic complications       ROS/MED HX  ENT/Pulmonary:     (+) sleep apnea, uses CPAP,                                      Neurologic:  - neg neurologic ROS     Cardiovascular:     (+) Dyslipidemia hypertension- -   -  - -                        dysrhythmias, a-fib,        Previous cardiac testing   Echo: Date: 23 Results:  Final  Impressions   1. Normal left ventricular chamber size.  Calculated ejection fraction 60%. No regional wall   motion abnormalities.   2. Normal left ventricular filling pressure.   3. Normal right ventricular chamber size, normal RV systolic function. Estimated RV systolic   pressure 32 mmHg.   4. Mildly enlarged sinus of Valsalva diameter (diameter 44 mm); upper normal 43 mm for age and   BSA.   5. Normal inferior vena cava size with normal inspiratory collapse (>50%).   6. Normal left atrial size.   7. No pericardial effusion.     Stress Test:  Date: Results:    ECG Reviewed:  Date: Results:    Cath:  Date: Results:      METS/Exercise Tolerance: >4 METS    Hematologic:  - neg hematologic  ROS     Musculoskeletal:  - neg musculoskeletal ROS     GI/Hepatic:  - neg GI/hepatic ROS     Renal/Genitourinary:     (+) renal disease, type: CRI,            Endo:  - neg endo ROS     Psychiatric/Substance Use:  - neg psychiatric ROS     Infectious Disease:  - neg infectious disease ROS     Malignancy:  - neg malignancy ROS     Other:  - neg other ROS          Physical Exam    Airway  airway exam normal      Mallampati: II   TM distance: > 3 FB   Neck ROM: full   Mouth opening: > 3 cm    Respiratory Devices and Support         Dental  no notable dental history     (+) Multiple crowns, permanant bridges      Cardiovascular   cardiovascular exam normal       Rhythm and rate: regular and normal     Pulmonary   pulmonary exam normal        breath sounds clear to auscultation           OUTSIDE LABS:  CBC:   Lab Results   Component Value Date    WBC 7.9 03/03/2020    WBC 6.5 12/08/2017    HGB 15.6 12/13/2023    HGB 15.9 11/02/2022    HCT 49.7 03/03/2020    HCT 45.9 12/08/2017     03/03/2020     12/08/2017     BMP:   Lab Results   Component Value Date     12/13/2023     12/16/2022    POTASSIUM 4.5 12/13/2023    POTASSIUM 4.5 12/16/2022    CHLORIDE 103 12/13/2023    CHLORIDE 110 (H) 12/16/2022    CO2 27  "12/13/2023    CO2 28 12/16/2022    BUN 20.6 12/13/2023    BUN 25 12/16/2022    CR 1.47 (H) 12/13/2023    CR 1.50 (H) 12/16/2022     (H) 12/13/2023    GLC 99 12/16/2022     COAGS:   Lab Results   Component Value Date    INR 0.97 03/03/2020     POC: No results found for: \"BGM\", \"HCG\", \"HCGS\"  HEPATIC:   Lab Results   Component Value Date    ALBUMIN 4.2 03/03/2020    PROTTOTAL 7.2 03/03/2020    ALT 26 03/03/2020    AST 20 03/03/2020    ALKPHOS 65 03/03/2020    BILITOTAL 1.0 03/03/2020     OTHER:   Lab Results   Component Value Date    PH 6.0 11/21/2002    ZELALEM 9.5 12/13/2023    TSH 1.85 03/03/2020       Anesthesia Plan    ASA Status:  3    NPO Status:  NPO Appropriate    Anesthesia Type: General.     - Airway: ETT   Induction: Intravenous.   Maintenance: Balanced.        Consents    Anesthesia Plan(s) and associated risks, benefits, and realistic alternatives discussed. Questions answered and patient/representative(s) expressed understanding.     - Discussed:     - Discussed with:  Patient      - Extended Intubation/Ventilatory Support Discussed: No.      - Patient is DNR/DNI Status: No     Use of blood products discussed: Yes.     - Discussed with: Patient.     - Consented: consented to blood products     Postoperative Care    Pain management: IV analgesics, Multi-modal analgesia, Oral pain medications.   PONV prophylaxis: Ondansetron (or other 5HT-3), Dexamethasone or Solumedrol, Background Propofol Infusion     Comments:    Other Comments: The risks and benefits of anesthesia, and the alternatives where applicable, have been discussed with the patient, and they wish to proceed.             BUCK Duran CRNA    I have reviewed the pertinent notes and labs in the chart from the past 30 days and (re)examined the patient.  Any updates or changes from those notes are reflected in this note.             # Drug Induced Platelet Defect: home medication list includes an antiplatelet medication  # Obesity: " "Estimated body mass index is 30.68 kg/m  as calculated from the following:    Height as of 12/26/23: 1.803 m (5' 11\").    Weight as of this encounter: 99.8 kg (220 lb).      "

## 2024-01-10 NOTE — ANESTHESIA POSTPROCEDURE EVALUATION
Patient: Dav Seay    Procedure: Procedure(s):  HERNIORRHAPHY, INGUINAL, OPEN       Anesthesia Type:  General    Note:  Disposition: Outpatient   Postop Pain Control: Uneventful            Sign Out: Well controlled pain   PONV: No   Neuro/Psych: Uneventful            Sign Out: Acceptable/Baseline neuro status   Airway/Respiratory: Uneventful            Sign Out: Acceptable/Baseline resp. status   CV/Hemodynamics: Uneventful            Sign Out: Acceptable CV status   Other NRE: NONE   DID A NON-ROUTINE EVENT OCCUR? No    Event details/Postop Comments:  Pt was happy with anesthesia care.  No complications.  I will follow up with the pt if needed.           Last vitals:  Vitals Value Taken Time   /65 01/10/24 0950   Temp 97.34  F (36.3  C) 01/10/24 0945   Pulse 48 01/10/24 0950   Resp 12 01/10/24 0950   SpO2 95 % 01/10/24 0948   Vitals shown include unfiled device data.    Electronically Signed By: BUCK Duran CRNA  January 10, 2024  10:53 AM

## 2024-01-10 NOTE — ANESTHESIA CARE TRANSFER NOTE
Patient: Dav Seay    Procedure: Procedure(s):  HERNIORRHAPHY, INGUINAL, OPEN       Diagnosis: Recurrent inguinal hernia of left side without obstruction or gangrene [K40.91]  Recurrent unilateral inguinal hernia with incarceration [K40.31]  Diagnosis Additional Information: No value filed.    Anesthesia Type:   General     Note:    Oropharynx: oropharynx clear of all foreign objects and spontaneously breathing  Level of Consciousness: drowsy  Oxygen Supplementation: face mask    Independent Airway: airway patency satisfactory and stable  Dentition: dentition unchanged  Vital Signs Stable: post-procedure vital signs reviewed and stable  Report to RN Given: handoff report given  Patient transferred to: PACU    Handoff Report: Identifed the Patient, Identified the Reponsible Provider, Reviewed the pertinent medical history, Discussed the surgical course, Reviewed Intra-OP anesthesia mangement and issues during anesthesia, Set expectations for post-procedure period and Allowed opportunity for questions and acknowledgement of understanding      Vitals:  Vitals Value Taken Time   BP     Temp     Pulse     Resp     SpO2 94 % 01/10/24 0849   Vitals shown include unfiled device data.    Electronically Signed By: BUCK Duran CRNA  January 10, 2024  8:51 AM

## 2024-01-10 NOTE — OP NOTE
[facility] Operative Note    Pre-operative diagnosis: Recurrent inguinal hernia of left side without obstruction or gangrene [K40.91]  Recurrent unilateral inguinal hernia with incarceration [K40.31]   Post-operative diagnosis: Same - indirect   Procedure: Procedure(s):  HERNIORRHAPHY, INGUINAL, OPEN - recurrent incarcerated   Surgeon: Lazaro Gordon MD   Assistant(s): Katelin Carpenter - Katelin Carpenter  PA-TOLU  assist was needed for positioning/prepping, visualization and bleeding management by retraction, suctioning, and suturing/closure.     Anesthesia: General endotracheal anesthesia   Estimated blood loss: Less than 10 ml   Total IV fluids: (See anesthesia record)   Blood transfusion: No transfusion was given during surgery   Total urine output: (See anesthesia record)   Drains: None   Specimens: None   Implants: Progrip mesh   Findings: Indirect recurrent left inguinal hernia with incarcerated omentum   Complications: None   Condition: Stable   Comments: Indications for procedure: This is a 73-year-old gentleman presenting with a recurrent incarcerated left inguinal hernia.  He has previous preperitoneal repair many years ago.  Because of the pain and it only partially reducing he opted to have it repaired.  See dictated operative report for full details     Details procedure:  With the cooperation the patient the preop holding area the left groin was marked.  He was taken to the operating room and after induction anesthesia the lower abdomen was prepped and draped sterile fashion.  Timeout was performed confirm the date and the patient was a procedure performed.  An incision was made 2 fingerbreadths above the inguinal ligament.  Subcutaneous tissue was opened and cautery.  Bakari's fascia was opened using cautery.  The external bleak fascia identified and is opened using a 15 blade and Metzenbaum scissors.  This incision was then extended down to the external ring.  We then bluntly dissected inferiorly to the  shelving edge and superiorly the conjoined tendon and a retractor was placed.  Circumferential control of the cord was obtained at the pubic tubercle with a Kitner dissector and a Penrose drain.  A sac was seen containing a large amount of adipose tissue.  This was dissected from the cord structures using blunt as well as sharp dissection.  The cremasterics were taken down using cautery.  After fully dissecting the sac it was partially reduced and opened using Metzenbaum scissors.  The remaining incarcerated omentum was dissected free and returned the abdomen.  High ligation of the sac was then carried out using 3-0 Vicryl.  The remnant of the sac was then transected and then returned to the preperitoneal space.  The internal ring was tightened using 3-0 Vicryl to assist in placement of the mesh.  A piece of pariah Bj ProGrip keyhole mesh was cut to an appropriate size and placed into the space around the cord structures.  It was sutured medially the pubic tubercle inferior to the shelving edge and superior to the conjoined tendon using 2-0 Prolene.  There is then copious agreed all fluid was suctioned out.  The external bleak fascia was then closed using a running 0 Vicryl to recreate the external ring to 1 fingerbreadth in size.  Subcutaneous tissue was reapproximated 3-0 Vicryl.  The skin was closed using a running 4-0 Monocryl subcuticular stitch.  Dermabond glue was applied and the patient taken from the operative to recovery in stable condition to be sent home.      Lazaro Gordon MD, FACS

## 2024-01-10 NOTE — DISCHARGE INSTRUCTIONS
[facility]    Home Care Following Hernia Repair    Dr. Gordon    Hernia Type:  Inguinal    Care of the Incision:  Surgical glue was used, keep your incision dry for 24 hours.  Then you may shower, but don t submerge under water for at least 2 weeks.  Gently pat your incision dry with a freshly laundered towel.  Do not touch your incision with bare hands or pick at scabs.  Leave your incision open to air.  Cover it only if clothing rubs or irritates it.  Activity:  Gradually increase your activity.  Walk short distances several times each day and increase the distance as your strength allows.  To promote circulation, do not cross your legs while sitting.  No strenuous lifting or straining for 2-3 weeks.   Do not lift anything over 10-20 pounds until your doctor approves an increase.  Return to work will be determined by the type of work you do and should be discussed with your physician.  Do not drive or operate equipment while taking prescription pain medicines.  You may drive 1 week after surgery if you have stopped taking prescription pain medicines and are pain-free enough to react quickly and make an emergency stop if necessary.    Diet:  Return to the diet you were on before surgery.  Drink plenty of  water.  Avoid foods that cause constipation.    REMEMBER--most prescription pain pills cause constipation.  Walking, extra fluids, and increased fiber (fresh fruits and vegetables, etc.) are natural remedies for constipation.  You can also take mineral oil, 1-2 Tablespoons per day.  If still constipated you may try a stool softener such as Colace or Miralax.    Call Your Physician if You Have:  Redness, increased swelling or cloudy drainage from your incision.  A temperature of more than 101 degrees F.  Worsening pain in your incision not relieved by your prescription pain pills and/or a short rest.  Any questions or concerns about your recovery, please call     Business hours  [phone]    After hours [phone] Nurse Advice Line (24 hours a day)    Follow-up Care:  If appointment not already made, make an appointment 2-3 weeks after your surgery.  Call [phone]

## 2024-01-10 NOTE — ANESTHESIA PROCEDURE NOTES
Airway       Patient location during procedure: OR       Procedure Start/Stop Times: 1/10/2024 7:35 AM  Staff -        CRNA: Shivani Yi APRN CRNA       Other Anesthesia Staff: Damien Mccarthy       Performed By: SRNA and with CRNAs       Procedure performed by resident/fellow/CRNA in presence of a teaching physician.    Consent for Airway        Urgency: elective  Indications and Patient Condition       Indications for airway management: jayden-procedural       Induction type:intravenous       Mask difficulty assessment: 2 - vent by mask + OA or adjuvant +/- NMBA    Final Airway Details       Final airway type: endotracheal airway       Successful airway: ETT - single and Oral  Endotracheal Airway Details        ETT size (mm): 7.5       Cuffed: yes       Successful intubation technique: direct laryngoscopy       DL Blade Type: Cole 2       Grade View of Cords: 1       Adjucts: stylet       Position: Right       Measured from: lips       Secured at (cm): 23       Bite block used: None    Post intubation assessment        Placement verified by: capnometry, equal breath sounds and chest rise        Number of attempts at approach: 1       Number of other approaches attempted: 0       Secured with: tape       Ease of procedure: easy       Dentition: Intact and Unchanged    Medication(s) Administered   Medication Administration Time: 1/10/2024 7:35 AM

## 2024-01-18 ENCOUNTER — OFFICE VISIT (OUTPATIENT)
Dept: SURGERY | Facility: CLINIC | Age: 74
End: 2024-01-18
Payer: COMMERCIAL

## 2024-01-18 VITALS
BODY MASS INDEX: 30.68 KG/M2 | SYSTOLIC BLOOD PRESSURE: 110 MMHG | TEMPERATURE: 96.3 F | DIASTOLIC BLOOD PRESSURE: 62 MMHG | WEIGHT: 220 LBS

## 2024-01-18 DIAGNOSIS — Z09 POSTOP CHECK: Primary | ICD-10-CM

## 2024-01-18 PROCEDURE — 99024 POSTOP FOLLOW-UP VISIT: CPT | Performed by: SPECIALIST

## 2024-01-18 ASSESSMENT — PAIN SCALES - GENERAL: PAINLEVEL: NO PAIN (0)

## 2024-01-18 NOTE — PROGRESS NOTES
Follow-up for open left inguinal hernia repair      Subjective:  Patient feels good.  No complaints or concerns.    Objective:  B/P: 110/62, T: 96.3, P: Data Unavailable, R: Data Unavailable  Abdomen: Incision healing well.  Hernia repair intact.    Assessment/plan:  Patient is status post open left inguinal hernia repair.  Doing well.  He will gradually increase his activity as tolerated and follow-up as necessary.    Lazaro Gordon MD, FACS

## 2024-03-27 PROBLEM — M16.11 PRIMARY OSTEOARTHRITIS OF RIGHT HIP: Status: ACTIVE | Noted: 2017-12-05

## 2024-03-27 PROBLEM — G47.30 SLEEP APNEA, UNSPECIFIED: Status: ACTIVE | Noted: 2024-03-27

## 2024-03-27 PROBLEM — H90.5 SENSORINEURAL HEARING LOSS OF COMBINED SITES: Status: ACTIVE | Noted: 2024-03-27

## 2024-03-27 PROBLEM — R00.1 BRADYCARDIA: Status: ACTIVE | Noted: 2024-03-27

## 2024-03-27 PROBLEM — H93.19 TINNITUS: Status: ACTIVE | Noted: 2024-03-27

## 2024-03-27 PROBLEM — G56.01 CARPAL TUNNEL SYNDROME OF RIGHT WRIST: Status: RESOLVED | Noted: 2017-07-25 | Resolved: 2024-03-27

## 2024-03-27 PROBLEM — G56.02 CARPAL TUNNEL SYNDROME OF LEFT WRIST: Status: ACTIVE | Noted: 2024-03-27

## 2024-03-27 PROBLEM — G47.33 OBSTRUCTIVE SLEEP APNEA SYNDROME IN ADULT: Status: ACTIVE | Noted: 2020-04-28

## 2024-03-27 PROBLEM — G47.31 CENTRAL SLEEP APNEA SYNDROME: Status: ACTIVE | Noted: 2020-04-28

## 2024-03-27 PROBLEM — G47.52 REM SLEEP BEHAVIOR DISORDER: Status: ACTIVE | Noted: 2020-04-28

## 2024-03-27 PROBLEM — I10 HTN (HYPERTENSION): Status: ACTIVE | Noted: 2024-03-27

## 2024-03-27 PROBLEM — G56.02 CARPAL TUNNEL SYNDROME OF LEFT WRIST: Status: RESOLVED | Noted: 2017-07-25 | Resolved: 2024-03-27

## 2024-03-27 PROBLEM — M25.532 PAIN IN LEFT WRIST: Status: ACTIVE | Noted: 2021-03-10

## 2024-03-27 PROBLEM — G47.30 SLEEP APNEA: Status: ACTIVE | Noted: 2020-01-01

## 2024-04-15 ENCOUNTER — HOSPITAL ENCOUNTER (OUTPATIENT)
Dept: CT IMAGING | Facility: CLINIC | Age: 74
Discharge: HOME | End: 2024-04-15
Attending: INTERNAL MEDICINE | Admitting: INTERNAL MEDICINE
Payer: MEDICARE

## 2024-04-15 ENCOUNTER — OFFICE VISIT (OUTPATIENT)
Dept: FAMILY MEDICINE | Facility: CLINIC | Age: 74
End: 2024-04-15
Payer: COMMERCIAL

## 2024-04-15 VITALS
HEART RATE: 52 BPM | BODY MASS INDEX: 31.61 KG/M2 | HEIGHT: 71 IN | RESPIRATION RATE: 20 BRPM | OXYGEN SATURATION: 100 % | SYSTOLIC BLOOD PRESSURE: 120 MMHG | DIASTOLIC BLOOD PRESSURE: 76 MMHG | TEMPERATURE: 97.7 F | WEIGHT: 225.8 LBS

## 2024-04-15 DIAGNOSIS — H25.9 SENILE CATARACT, UNSPECIFIED AGE-RELATED CATARACT TYPE, UNSPECIFIED LATERALITY: ICD-10-CM

## 2024-04-15 DIAGNOSIS — Z01.818 PREOP GENERAL PHYSICAL EXAM: Primary | ICD-10-CM

## 2024-04-15 DIAGNOSIS — N18.31 STAGE 3A CHRONIC KIDNEY DISEASE (H): ICD-10-CM

## 2024-04-15 DIAGNOSIS — I48.91 ATRIAL FIBRILLATION WITH RVR (H): ICD-10-CM

## 2024-04-15 DIAGNOSIS — R91.8 PULMONARY NODULES: ICD-10-CM

## 2024-04-15 PROCEDURE — 71250 CT THORAX DX C-: CPT | Mod: ME

## 2024-04-15 PROCEDURE — 99214 OFFICE O/P EST MOD 30 MIN: CPT | Performed by: FAMILY MEDICINE

## 2024-04-15 ASSESSMENT — PAIN SCALES - GENERAL: PAINLEVEL: NO PAIN (0)

## 2024-04-15 NOTE — PROGRESS NOTES
Preoperative Evaluation  [address] [address]  Phone: [phone]  Fax: [phone]  Primary Provider: Loan Pryor  Pre-op Performing Provider: LOAN PRYOR  Apr 15, 2024       Dav is a 74 year old, presenting for the following:  Pre-Op Exam        4/15/2024     3:58 PM   Additional Questions   Roomed by Lynne ANGEL     Surgical Information  Surgery/Procedure: Cataract  Surgery Location: [facility]  Surgeon: Mingo Navarro  Surgery Date: 04/30/2024  Time of Surgery: TBD  Where patient plans to recover: At home with family  Fax number for surgical facility: [phone]    Assessment & Plan     The proposed surgical procedure is considered LOW risk.      ICD-10-CM    1. Preop general physical exam  Z01.818       2. Senile cataract, unspecified age-related cataract type, unspecified laterality  H25.9       3. Atrial fibrillation with RVR (H)  I48.91       4. Stage 3a chronic kidney disease (H)  N18.31                   - No identified additional risk factors other than previously addressed    Antiplatelet or Anticoagulation Medication Instructions   - Bleeding risk is low for this procedure (e.g. dental, skin, cataract).    Additional Medication Instructions  Patient is to take all scheduled medications on the day of surgery    Recommendation  APPROVAL GIVEN to proceed with proposed procedure, without further diagnostic evaluation.          Subjective       HPI related to upcoming procedure: Dav Seay is a 74 year old male who presents with right cataract         4/8/2024     4:16 PM   Preop Questions   1. Have you ever had a heart attack or stroke? No   2. Have you ever had surgery on your heart or blood vessels, such as a stent placement, a coronary artery bypass, or surgery on an artery in your head, neck, heart, or legs? No   3. Do you have chest pain with activity? No   4. Do you have a history of  heart failure? No   5. Do you  currently have a cold, bronchitis or symptoms of other infection? No   6. Do you have a cough, shortness of breath, or wheezing? No   7. Do you or anyone in your family have previous history of blood clots? No   8. Do you or does anyone in your family have a serious bleeding problem such as prolonged bleeding following surgeries or cuts? No   9. Have you ever had problems with anemia or been told to take iron pills? No   10. Have you had any abnormal blood loss such as black, tarry or bloody stools? No   11. Have you ever had a blood transfusion? No   12. Are you willing to have a blood transfusion if it is medically needed before, during, or after your surgery? Yes   13. Have you or any of your relatives ever had problems with anesthesia? No   14. Do you have sleep apnea, excessive snoring or daytime drowsiness? YES -    14a. Do you have a CPAP machine? Yes   15. Do you have any artifical heart valves or other implanted medical devices like a pacemaker, defibrillator, or continuous glucose monitor? No   16. Do you have artificial joints? YES -    17. Are you allergic to latex? No       Health Care Directive  Patient has a Health Care Directive on file      Preoperative Review of    reviewed - no record of controlled substances prescribed.      Status of Chronic Conditions:  See problem list for active medical problems.  Problems all longstanding and stable, except as noted/documented.  See ROS for pertinent symptoms related to these conditions.    Patient Active Problem List    Diagnosis Date Noted    Bradycardia 03/27/2024     Priority: Medium     Mar 21, 2016 Entered By: HELLEN REESE Comment: Likely  Beta Blocker to discuss with Dr. Perera PCP      Sensorineural hearing loss of combined sites 03/27/2024     Priority: Medium    Sleep apnea, unspecified 03/27/2024     Priority: Medium    Tinnitus 03/27/2024     Priority: Medium    Carpal tunnel syndrome of left wrist 03/27/2024     Priority: Medium      Mar 16, 2017 Entered By: HELLEN REESE Comment: March 2017 ordered a brace      HTN (hypertension) 03/27/2024     Priority: Medium     Mar 21, 2016 Entered By: HELLEN REESE Comment: Since 2012 has been on Atenolol      Hyperlipidemia LDL goal <100 12/14/2022     Priority: Medium    Atrial fibrillation with RVR (H) 10/25/2021     Priority: Medium    Pain in left wrist 03/10/2021     Priority: Medium    Central sleep apnea syndrome 04/28/2020     Priority: Medium    Obstructive sleep apnea syndrome in adult 04/28/2020     Priority: Medium    REM sleep behavior disorder 04/28/2020     Priority: Medium    Sleep apnea 01/01/2020     Priority: Medium     Mar 29, 2021 Entered By: TAMIKO JESUS Comment: sas  at [facility]      CKD (chronic kidney disease) stage 3, GFR 30-59 ml/min (H) 01/08/2018     Priority: Medium    Primary osteoarthritis of right hip 12/05/2017     Priority: Medium    Insomnia, transient 12/02/2013     Priority: Medium    Advance Care Planning 11/30/2012     Priority: Medium     Advance Care Planning 9/16/2015: Receipt of ACP document:  Received: Health Care Directive which was witnessed or notarized on 8/29/15.  Document not previously scanned.  Validation form completed and sent with document to be scanned.  Code Status needs to be updated to reflect choices in most recent ACP document. Confirmed/documented designated decision maker(s).  Added by Tara Davis RN, BSN, MA, Advance Care Planning Liaison.  Advance Care Planning 11/30/12: Patient states has Advance Directive and will bring in a copy to clinic. 11/30/2012  Henri/FLOR         Hypertension goal BP (blood pressure) < 140/90 02/15/2012     Priority: Medium    History of total hip replacement 12/20/2011     Priority: Medium     Overview:    Left      Osteoarthritis of hip 12/20/2011     Priority: Medium     Mar 16, 2017 Entered By: HELLEN REESE Comment: Takes celebrex      CARDIOVASCULAR SCREENING; LDL GOAL  LESS THAN 160 10/31/2010     Priority: Medium      Past Medical History:   Diagnosis Date    Arthritis 2007    Hypertension     Traumatic amputation of other finger(s) (complete) (partial), without mention of complication 1975    Amputated RIght 2nd finger     Past Surgical History:   Procedure Laterality Date    COLONOSCOPY  6/10/2013    Procedure: COLONOSCOPY;  Colonoscopy;  Surgeon: Wes Pablo MD;  Location: [facility]    COLONOSCOPY N/A 5/20/2019    Procedure: COLONOSCOPY;  Surgeon: Jesus Harris MD;  Location: [facility]    HC REMOVAL OF TONSILS,<11 Y/O  5 years old    Tonsils <14y.o.    HERNIA REPAIR, INGUINAL RT/LT      HERNIORRHAPHY INGUINAL Left 1/10/2024    Procedure: HERNIORRHAPHY, LEFT INGUINAL, OPEN;  Surgeon: Lazaro Gordon MD;  Location: [facility]    JOINT REPLACEMENT, HIP RT/LT  12/19/11    Left total hip arthroplasty. [facility]    JOINT REPLACEMENT, HIP RT/LT  12/18/2017    Right tota hip arthroplasty. [facility]    RELEASE CARPAL TUNNEL Left 10/18/2017    Procedure: RELEASE CARPAL TUNNEL;  Left Carpal Tunnel Release;  Surgeon: Norris Townsend MD;  Location:  OR    [facility] COLONOSCOPY THRU STOMA W BIOPSY/CAUTERY TUMOR/POLYP/LESION  2003    hyperplastic polyp repeat in 2008.     Current Outpatient Medications   Medication Sig Dispense Refill    acetaminophen (TYLENOL) 500 MG tablet Take 500-1,000 mg by mouth every 6 hours as needed for mild pain      aspirin (ASA) 81 MG EC tablet Take 81 mg by mouth      atorvastatin (LIPITOR) 20 MG tablet Take 1 tablet (20 mg) by mouth daily 90 tablet 3    lisinopril (ZESTRIL) 5 MG tablet Take 1 tablet (5 mg) by mouth daily 90 tablet 3    melatonin 1 MG/ML LIQD liquid Take 5 mg by mouth      metoprolol succinate ER (TOPROL XL) 50 MG 24 hr tablet Take 1 tablet (50 mg) by mouth daily 90 tablet 3    Probiotic Product (PROBIOTIC DAILY PO)          No Known Allergies     Social History     Tobacco Use    Smoking status: Never    Smokeless  "tobacco: Never   Substance Use Topics    Alcohol use: Yes     Comment: occasional     Family History   Problem Relation Age of Onset    Heart Disease Mother         Tachycardia    Osteoporosis Mother     Diabetes Father     Cancer Father         skin, lymph and colon CA    Colon Cancer Father     Arthritis Brother     Anesthesia Reaction No family hx of      History   Drug Use No         Review of Systems    Review of Systems  Constitutional, neuro, ENT, endocrine, pulmonary, cardiac, gastrointestinal, genitourinary, musculoskeletal, integument and psychiatric systems are negative, except as otherwise noted.    Objective    /76   Pulse 52   Temp 97.7  F (36.5  C) (Temporal)   Resp 20   Ht 1.791 m (5' 10.5\")   Wt 102.4 kg (225 lb 12.8 oz)   SpO2 100%   BMI 31.94 kg/m     Estimated body mass index is 31.94 kg/m  as calculated from the following:    Height as of this encounter: 1.791 m (5' 10.5\").    Weight as of this encounter: 102.4 kg (225 lb 12.8 oz).  Physical Exam  GENERAL: alert and no distress  EYES: Eyes grossly normal to inspection, EOMI, and right cataract  NECK: no adenopathy, no asymmetry, masses, or scars  RESP: lungs clear to auscultation - no rales, rhonchi or wheezes  CV: regular rate and rhythm, normal S1 S2, no S3 or S4, no murmur, click or rub, no peripheral edema  ABDOMEN: soft, nontender, no hepatosplenomegaly, no masses and bowel sounds normal  MS: no gross musculoskeletal defects noted, no edema  NEURO: Normal strength and tone, mentation intact and speech normal  PSYCH: mentation appears normal, affect normal/bright  LYMPH: no cervical, supraclavicular, axillary, or inguinal adenopathy    Recent Labs   Lab Test 12/13/23  1321 12/16/22  0826 11/02/22  0805   HGB 15.6  --  15.9    141  --    POTASSIUM 4.5 4.5  --    CR 1.47* 1.50*  --         Diagnostics  No labs were ordered during this visit.   No EKG required for low risk surgery (cataract, skin procedure, breast biopsy, " etc).    Revised Cardiac Risk Index (RCRI)  The patient has the following serious cardiovascular risks for perioperative complications:   - No serious cardiac risks = 0 points     RCRI Interpretation: 0 points: Class I (very low risk - 0.4% complication rate)         Signed Electronically by: Ryan Perera MD  Copy of this evaluation report is provided to requesting physician.

## 2024-04-15 NOTE — PATIENT INSTRUCTIONS
Preparing for Your Surgery  Getting started  A nurse will call you to review your health history and instructions. They will give you an arrival time based on your scheduled surgery time. Please be ready to share:  Your doctor's clinic name and phone number  Your medical, surgical, and anesthesia history  A list of allergies and sensitivities  A list of medicines, including herbal treatments and over-the-counter drugs  Whether the patient has a legal guardian (ask how to send us the papers in advance)  Please tell us if you're pregnant--or if there's any chance you might be pregnant. Some surgeries may injure a fetus (unborn baby), so they require a pregnancy test. Surgeries that are safe for a fetus don't always need a test, and you can choose whether to have one.   If you have a child who's having surgery, please ask for a copy of Preparing for Your Child's Surgery.    Preparing for surgery  Within 10 to 30 days of surgery: Have a pre-op exam (sometimes called an H&P, or History and Physical). This can be done at a clinic or pre-operative center.  If you're having a , you may not need this exam. Talk to your care team.  At your pre-op exam, talk to your care team about all medicines you take. If you need to stop any medicines before surgery, ask when to start taking them again.  We do this for your safety. Many medicines can make you bleed too much during surgery. Some change how well surgery (anesthesia) drugs work.  Call your insurance company to let them know you're having surgery. (If you don't have insurance, call [phone].)  Call your clinic if there's any change in your health. This includes signs of a cold or flu (sore throat, runny nose, cough, rash, fever). It also includes a scrape or scratch near the surgery site.  If you have questions on the day of surgery, call your hospital or surgery center.  Eating and drinking guidelines  For your safety: Unless your surgeon tells you otherwise,  follow the guidelines below.  Eat and drink as usual until 8 hours before you arrive for surgery. After that, no food or milk.  Drink clear liquids until 2 hours before you arrive. These are liquids you can see through, like water, Gatorade, and Propel Water. They also include plain black coffee and tea (no cream or milk), candy, and breath mints. You can spit out gum when you arrive.  If you drink alcohol: Stop drinking it the night before surgery.  If your care team tells you to take medicine on the morning of surgery, it's okay to take it with a sip of water.  Preventing infection  Shower or bathe the night before and morning of your surgery. Follow the instructions your clinic gave you. (If no instructions, use regular soap.)  Don't shave or clip hair near your surgery site. We'll remove the hair if needed.  Don't smoke or vape the morning of surgery. You may chew nicotine gum up to 2 hours before surgery. A nicotine patch is okay.  Note: Some surgeries require you to completely quit smoking and nicotine. Check with your surgeon.  Your care team will make every effort to keep you safe from infection. We will:  Clean our hands often with soap and water (or an alcohol-based hand rub).  Clean the skin at your surgery site with a special soap that kills germs.  Give you a special gown to keep you warm. (Cold raises the risk of infection.)  Wear special hair covers, masks, gowns and gloves during surgery.  Give antibiotic medicine, if prescribed. Not all surgeries need antibiotics.  What to bring on the day of surgery  Photo ID and insurance card  Copy of your health care directive, if you have one  Glasses and hearing aids (bring cases)  You can't wear contacts during surgery  Inhaler and eye drops, if you use them (tell us about these when you arrive)  CPAP machine or breathing device, if you use them  A few personal items, if spending the night  If you have . . .  A pacemaker, ICD (cardiac defibrillator) or other  implant: Bring the ID card.  An implanted stimulator: Bring the remote control.  A legal guardian: Bring a copy of the certified (court-stamped) guardianship papers.  Please remove any jewelry, including body piercings. Leave jewelry and other valuables at home.  If you're going home the day of surgery  You must have a responsible adult drive you home. They should stay with you overnight as well.  If you don't have someone to stay with you, and you aren't safe to go home alone, we may keep you overnight. Insurance often won't pay for this.  After surgery  If it's hard to control your pain or you need more pain medicine, please call your surgeon's office.  Questions?   If you have any questions for your care team, list them here: _________________________________________________________________________________________________________________________________________________________________________ ____________________________________ ____________________________________ ____________________________________  For informational purposes only. Not to replace the advice of your health care provider. Copyright   2003, 2019 [place] [employer] [facility]. All rights reserved. Clinically reviewed by Barbara Chavarria MD. [employer] 834084 - REV 12/22.    How to Take Your Medication Before Surgery  - Take all of your medications before surgery as usual

## 2024-04-22 ENCOUNTER — VIRTUAL VISIT (OUTPATIENT)
Dept: PULMONOLOGY | Facility: CLINIC | Age: 74
End: 2024-04-22
Attending: FAMILY MEDICINE
Payer: COMMERCIAL

## 2024-04-22 ENCOUNTER — PRE VISIT (OUTPATIENT)
Dept: PULMONOLOGY | Facility: CLINIC | Age: 74
End: 2024-04-22
Payer: COMMERCIAL

## 2024-04-22 VITALS — WEIGHT: 215 LBS | BODY MASS INDEX: 30.1 KG/M2 | HEIGHT: 71 IN

## 2024-04-22 DIAGNOSIS — R91.1 LUNG NODULE SEEN ON IMAGING STUDY: ICD-10-CM

## 2024-04-22 PROCEDURE — 99204 OFFICE O/P NEW MOD 45 MIN: CPT | Mod: 95 | Performed by: INTERNAL MEDICINE

## 2024-04-22 ASSESSMENT — PAIN SCALES - GENERAL: PAINLEVEL: NO PAIN (0)

## 2024-04-22 NOTE — PROGRESS NOTES
Virtual Visit Details    Type of service:  Video Visit   Video Start Time:  1515  Video End Time: 1520    Originating Location (pt. Location): Home    Distant Location (provider location):  On-site  Platform used for Video Visit: [facility]    LUNG NODULE & INTERVENTIONAL PULMONARY CLINIC  CL[facility]ICS & SUR[facility]ERY [facility], [facility]     Dav Seay MRN# [id number]   Age: 74 year old YOB: 1950       [place]ing [place]  [address] [place] [address]       Assessment and Plan:    1. New solitary pulmonary lung nodule(s). Given the characteristics on current/previous imaging and risk factors; I would classify this to be Low (<6%) risk for cancer. Non smoker. < 6 mm. No further follow up recommended.         History:     Dav Seay is a 74 year old male with sig h/o for BURT who is here for evaluation/followup of lung nodule(s). This was found incidentally.  He has no pulmonary symptoms.      - My interpretation of the images relevant for this visit includes: a few very small nodules, largest 5 mm              Past Medical History:      Past Medical History:   Diagnosis Date    Arthritis 2007    Hypertension     Traumatic amputation of other finger(s) (complete) (partial), without mention of complication 1975    Amputated RIght 2nd finger           Past Surgical History:      Past Surgical History:   Procedure Laterality Date    COLONOSCOPY  6/10/2013    Procedure: COLONOSCOPY;  Colonoscopy;  Surgeon: Wes Pablo MD;  Location:  GI    COLONOSCOPY N/A 5/20/2019    Procedure: COLONOSCOPY;  Surgeon: Jesus Harris MD;  Location:  GI    HC REMOVAL OF TONSILS,<15 Y/O  5 years old    Tonsils <14y.o.    HERNIA REPAIR, INGUINAL RT/LT      HERNIORRHAPHY INGUINAL Left 1/10/2024    Procedure: HERNIORRHAPHY, LEFT INGUINAL, OPEN;  Surgeon: Lazaro Gordon MD;  Location:  OR    JOINT REPLACEMENT, HIP RT/LT  12/19/11     "Left total hip arthroplasty. [facility]    JOINT REPLACEMENT, HIP RT/LT  12/18/2017    Right tota hip arthroplasty. [facility]    RELEASE CARPAL TUNNEL Left 10/18/2017    Procedure: RELEASE CARPAL TUNNEL;  Left Carpal Tunnel Release;  Surgeon: Norris Townsend MD;  Location:  OR    [facility] COLONOSCOPY THRU STOMA W BIOPSY/CAUTERY TUMOR/POLYP/LESION  2003    hyperplastic polyp repeat in 2008.          Social History:     Social History     Tobacco Use    Smoking status: Never     Passive exposure: Never    Smokeless tobacco: Never   Substance Use Topics    Alcohol use: Yes     Comment: occasional          Family History:     Family History   Problem Relation Age of Onset    Heart Disease Mother         Tachycardia    Osteoporosis Mother     Diabetes Father     Cancer Father         skin, lymph and colon CA    Colon Cancer Father     Arthritis Brother     Anesthesia Reaction No family hx of            Allergies:    No Known Allergies       Medications:     Current Outpatient Medications   Medication Sig Dispense Refill    acetaminophen (TYLENOL) 500 MG tablet Take 500-1,000 mg by mouth every 6 hours as needed for mild pain      aspirin (ASA) 81 MG EC tablet Take 81 mg by mouth      atorvastatin (LIPITOR) 20 MG tablet Take 1 tablet (20 mg) by mouth daily 90 tablet 3    lisinopril (ZESTRIL) 5 MG tablet Take 1 tablet (5 mg) by mouth daily 90 tablet 3    melatonin 1 MG/ML LIQD liquid Take 5 mg by mouth      metoprolol succinate ER (TOPROL XL) 50 MG 24 hr tablet Take 1 tablet (50 mg) by mouth daily 90 tablet 3    Probiotic Product (PROBIOTIC DAILY PO)        No current facility-administered medications for this visit.          Review of Systems:     See HPI         Physical Exam:   Ht 1.791 m (5' 10.5\")   Wt 97.5 kg (215 lb)   BMI 30.41 kg/m      Constitutional - looks well, in no apparent distress  Eyes - no redness or discharge  Respiratory -breathing appears comfortable. No wheeze or rhonchi.   Skin - " No appreciable discoloration or lesions (very limited exam)  Neurological - No apparent tremors. Speech fluent and articlate  Psychiatric - no signs of delirium or anxiety          Current Laboratory Data:   All laboratory and imaging data reviewed.    No results found for this or any previous visit (from the past 24 hour(s)).

## 2024-04-22 NOTE — NURSING NOTE
Is the patient currently in the state of [place]? YES    Visit mode:VIDEO    If the visit is dropped, the patient can be reconnected by: VIDEO VISIT: Text to cell phone:   Telephone Information:   Mobile [phone]       Will anyone else be joining the visit? NO  (If patient encounters technical issues they should call [phone] :[phone])    How would you like to obtain your AVS? MyChart    Are changes needed to the allergy or medication list? No    Are refills needed on medications prescribed by this physician? NO    Reason for visit: Consult    Letitia BUSH

## 2024-04-22 NOTE — LETTER
4/22/2024       RE: Dav Seay  [address]  [facility] [address]     Dear Colleague,    Thank you for referring your patient, aDv Seay, to the [facility] at [facility]. Please see a copy of my visit note below.    Virtual Visit Details    Type of service:  Video Visit   Video Start Time:  1515  Video End Time: 1520    Originating Location (pt. Location): Home    Distant Location (provider location):  On-site  Platform used for Video Visit: [facility]    LUNG NODULE & INTERVENTIONAL PULMONARY CLINIC  CLINICS & SURGERY CENTER, [facility]     Dav Seay MRN# [id number]   Age: 74 year old YOB: 1950       Requesting Physician: Terence De Jesus DO  [address] [place] [address]       Assessment and Plan:    1. New solitary pulmonary lung nodule(s). Given the characteristics on current/previous imaging and risk factors; I would classify this to be Low (<6%) risk for cancer. Non smoker. < 6 mm. No further follow up recommended.         History:     Dav Seay is a 74 year old male with sig h/o for BURT who is here for evaluation/followup of lung nodule(s). This was found incidentally.  He has no pulmonary symptoms.      - My interpretation of the images relevant for this visit includes: a few very small nodules, largest 5 mm              Past Medical History:      Past Medical History:   Diagnosis Date     Arthritis 2007     Hypertension      Traumatic amputation of other finger(s) (complete) (partial), without mention of complication 1975    Amputated RIght 2nd finger           Past Surgical History:      Past Surgical History:   Procedure Laterality Date     COLONOSCOPY  6/10/2013    Procedure: COLONOSCOPY;  Colonoscopy;  Surgeon: Wes Pablo MD;  Location:  GI     COLONOSCOPY N/A 5/20/2019    Procedure: COLONOSCOPY;  Surgeon: Jesus Harris,  MD;  Location: [facility] GI     HC REMOVAL OF TONSILS,<13 Y/O  5 years old    Tonsils <14y.o.     HERNIA REPAIR, INGUINAL RT/LT       HERNIORRHAPHY INGUINAL Left 1/10/2024    Procedure: HERNIORRHAPHY, LEFT INGUINAL, OPEN;  Surgeon: Lazaro Gordon MD;  Location: [facility] OR     JOINT REPLACEMENT, HIP RT/LT  12/19/11    Left total hip arthroplasty. [facility]     JOINT REPLACEMENT, HIP RT/LT  12/18/2017    Right tota hip arthroplasty. [facility]     RELEASE CARPAL TUNNEL Left 10/18/2017    Procedure: RELEASE CARPAL TUNNEL;  Left Carpal Tunnel Release;  Surgeon: Norris Townsend MD;  Location: [facility] OR     [facility] COLONOSCOPY THRU STOMA W BIOPSY/CAUTERY TUMOR/POLYP/LESION  2003    hyperplastic polyp repeat in 2008.          Social History:     Social History     Tobacco Use     Smoking status: Never     Passive exposure: Never     Smokeless tobacco: Never   Substance Use Topics     Alcohol use: Yes     Comment: occasional          Family History:     Family History   Problem Relation Age of Onset     Heart Disease Mother         Tachycardia     Osteoporosis Mother      Diabetes Father      Cancer Father         skin, lymph and colon CA     Colon Cancer Father      Arthritis Brother      Anesthesia Reaction No family hx of            Allergies:    No Known Allergies       Medications:     Current Outpatient Medications   Medication Sig Dispense Refill     acetaminophen (TYLENOL) 500 MG tablet Take 500-1,000 mg by mouth every 6 hours as needed for mild pain       aspirin (ASA) 81 MG EC tablet Take 81 mg by mouth       atorvastatin (LIPITOR) 20 MG tablet Take 1 tablet (20 mg) by mouth daily 90 tablet 3     lisinopril (ZESTRIL) 5 MG tablet Take 1 tablet (5 mg) by mouth daily 90 tablet 3     melatonin 1 MG/ML LIQD liquid Take 5 mg by mouth       metoprolol succinate ER (TOPROL XL) 50 MG 24 hr tablet Take 1 tablet (50 mg) by mouth daily 90 tablet 3     Probiotic Product (PROBIOTIC DAILY PO)        No current  "facility-administered medications for this visit.          Review of Systems:     See HPI         Physical Exam:   Ht 1.791 m (5' 10.5\")   Wt 97.5 kg (215 lb)   BMI 30.41 kg/m      Constitutional - looks well, in no apparent distress  Eyes - no redness or discharge  Respiratory -breathing appears comfortable. No wheeze or rhonchi.   Skin - No appreciable discoloration or lesions (very limited exam)  Neurological - No apparent tremors. Speech fluent and articlate  Psychiatric - no signs of delirium or anxiety          Current Laboratory Data:   All laboratory and imaging data reviewed.    No results found for this or any previous visit (from the past 24 hour(s)).               Again, thank you for allowing me to participate in the care of your patient.      Sincerely,    Jeremiah Orellana MD    "

## 2024-05-30 ENCOUNTER — HOSPITAL ENCOUNTER (EMERGENCY)
Facility: CLINIC | Age: 74
Discharge: HOME | End: 2024-05-30
Attending: FAMILY MEDICINE | Admitting: FAMILY MEDICINE
Payer: MEDICARE

## 2024-05-30 VITALS
SYSTOLIC BLOOD PRESSURE: 156 MMHG | BODY MASS INDEX: 30.38 KG/M2 | HEIGHT: 71 IN | TEMPERATURE: 98 F | RESPIRATION RATE: 16 BRPM | OXYGEN SATURATION: 99 % | WEIGHT: 217 LBS | DIASTOLIC BLOOD PRESSURE: 86 MMHG | HEART RATE: 52 BPM

## 2024-05-30 DIAGNOSIS — R31.9 URINARY TRACT INFECTION WITH HEMATURIA, SITE UNSPECIFIED: ICD-10-CM

## 2024-05-30 DIAGNOSIS — N39.0 URINARY TRACT INFECTION WITH HEMATURIA, SITE UNSPECIFIED: ICD-10-CM

## 2024-05-30 LAB
ALBUMIN UR-MCNC: 100 MG/DL
APPEARANCE UR: CLEAR
BILIRUB UR QL STRIP: NEGATIVE
COLOR UR AUTO: YELLOW
GLUCOSE UR STRIP-MCNC: NEGATIVE MG/DL
HGB UR QL STRIP: ABNORMAL
KETONES UR STRIP-MCNC: NEGATIVE MG/DL
LEUKOCYTE ESTERASE UR QL STRIP: NEGATIVE
NITRATE UR QL: NEGATIVE
PH UR STRIP: 6 [PH] (ref 5–7)
RBC URINE: >182 /HPF
SP GR UR STRIP: 1.01 (ref 1–1.03)
UROBILINOGEN UR STRIP-MCNC: NORMAL MG/DL
WBC URINE: >182 /HPF

## 2024-05-30 PROCEDURE — 99284 EMERGENCY DEPT VISIT MOD MDM: CPT | Performed by: FAMILY MEDICINE

## 2024-05-30 PROCEDURE — 87186 SC STD MICRODIL/AGAR DIL: CPT | Performed by: FAMILY MEDICINE

## 2024-05-30 PROCEDURE — 99283 EMERGENCY DEPT VISIT LOW MDM: CPT

## 2024-05-30 PROCEDURE — 81001 URINALYSIS AUTO W/SCOPE: CPT | Performed by: FAMILY MEDICINE

## 2024-05-30 PROCEDURE — 87086 URINE CULTURE/COLONY COUNT: CPT | Performed by: FAMILY MEDICINE

## 2024-05-30 RX ORDER — CIPROFLOXACIN 500 MG/1
500 TABLET, FILM COATED ORAL 2 TIMES DAILY
Qty: 20 TABLET | Refills: 0 | Status: SHIPPED | OUTPATIENT
Start: 2024-05-30 | End: 2024-06-09

## 2024-05-30 ASSESSMENT — ACTIVITIES OF DAILY LIVING (ADL): ADLS_ACUITY_SCORE: 35

## 2024-05-30 NOTE — ED PROVIDER NOTES
History     Chief Complaint   Patient presents with    Hematuria     HPI  Dav Seay is a 74 year old male who presents with hematuria that started this morning.  Patient is having a little bit of discomfort with urination with this.  Denies any fevers or chills.  Denies any belly pain or back pain.  Patient states this happened 1 time before and he had an infection at that time.  Denies any recent trauma.    Allergies:  No Known Allergies    Problem List:    Patient Active Problem List    Diagnosis Date Noted    Bradycardia 03/27/2024     Priority: Medium     Mar 21, 2016 Entered By: HELLEN REESE Comment: Likely  Beta Blocker to discuss with Dr. Perera PCP      Sensorineural hearing loss of combined sites 03/27/2024     Priority: Medium    Sleep apnea, unspecified 03/27/2024     Priority: Medium    Tinnitus 03/27/2024     Priority: Medium    Carpal tunnel syndrome of left wrist 03/27/2024     Priority: Medium     Mar 16, 2017 Entered By: HELLEN REESE Comment: March 2017 ordered a brace      HTN (hypertension) 03/27/2024     Priority: Medium     Mar 21, 2016 Entered By: HELLEN REESE Comment: Since 2012 has been on Atenolol      Hyperlipidemia LDL goal <100 12/14/2022     Priority: Medium    Atrial fibrillation with RVR (H) 10/25/2021     Priority: Medium    Pain in left wrist 03/10/2021     Priority: Medium    Central sleep apnea syndrome 04/28/2020     Priority: Medium    Obstructive sleep apnea syndrome in adult 04/28/2020     Priority: Medium    REM sleep behavior disorder 04/28/2020     Priority: Medium    Sleep apnea 01/01/2020     Priority: Medium     Mar 29, 2021 Entered By: TAMIKO JESUS Comment: sas  at [facility]      CKD (chronic kidney disease) stage 3, GFR 30-59 ml/min (H) 01/08/2018     Priority: Medium    Primary osteoarthritis of right hip 12/05/2017     Priority: Medium    Insomnia, transient 12/02/2013     Priority: Medium    Advance Care Planning 11/30/2012      Priority: Medium     Advance Care Planning 9/16/2015: Receipt of ACP document:  Received: Health Care Directive which was witnessed or notarized on 8/29/15.  Document not previously scanned.  Validation form completed and sent with document to be scanned.  Code Status needs to be updated to reflect choices in most recent ACP document. Confirmed/documented designated decision maker(s).  Added by Tara Davis RN, BSN, MA, Advance Care Planning Liaison.  Advance Care Planning 11/30/12: Patient states has Advance Directive and will bring in a copy to clinic. 11/30/2012  Henri/FLOR         Hypertension goal BP (blood pressure) < 140/90 02/15/2012     Priority: Medium    History of total hip replacement 12/20/2011     Priority: Medium     Overview:    Left      Osteoarthritis of hip 12/20/2011     Priority: Medium     Mar 16, 2017 Entered By: HELLEN REESE Comment: Takes celebrex      CARDIOVASCULAR SCREENING; LDL GOAL LESS THAN 160 10/31/2010     Priority: Medium        Past Medical History:    Past Medical History:   Diagnosis Date    Arthritis 2007    Hypertension     Traumatic amputation of other finger(s) (complete) (partial), without mention of complication 1975       Past Surgical History:    Past Surgical History:   Procedure Laterality Date    COLONOSCOPY  6/10/2013    Procedure: COLONOSCOPY;  Colonoscopy;  Surgeon: Wes Pablo MD;  Location:  GI    COLONOSCOPY N/A 5/20/2019    Procedure: COLONOSCOPY;  Surgeon: Jesus Harris MD;  Location:  GI    HC REMOVAL OF TONSILS,<11 Y/O  5 years old    Tonsils <14y.o.    HERNIA REPAIR, INGUINAL RT/LT      HERNIORRHAPHY INGUINAL Left 1/10/2024    Procedure: HERNIORRHAPHY, LEFT INGUINAL, OPEN;  Surgeon: Lazaro Gordon MD;  Location:  OR    JOINT REPLACEMENT, HIP RT/LT  12/19/11    Left total hip arthroplasty. [facility]    JOINT REPLACEMENT, HIP RT/LT  12/18/2017    Right tota hip arthroplasty. [facility]  "CARPAL TUNNEL Left 10/18/2017    Procedure: RELEASE CARPAL TUNNEL;  Left Carpal Tunnel Release;  Surgeon: Norris Townsend MD;  Location:  OR    [facility] COLONOSCOPY THRU STOMA W BIOPSY/CAUTERY TUMOR/POLYP/LESION  2003    hyperplastic polyp repeat in 2008.       Family History:    Family History   Problem Relation Age of Onset    Heart Disease Mother         Tachycardia    Osteoporosis Mother     Diabetes Father     Cancer Father         skin, lymph and colon CA    Colon Cancer Father     Arthritis Brother     Anesthesia Reaction No family hx of        Social History:  Marital Status:   [2]  Social History     Tobacco Use    Smoking status: Never     Passive exposure: Never    Smokeless tobacco: Never   Vaping Use    Vaping status: Never Used   Substance Use Topics    Alcohol use: Yes     Comment: occasional    Drug use: No        Medications:    aspirin (ASA) 81 MG EC tablet  atorvastatin (LIPITOR) 20 MG tablet  lisinopril (ZESTRIL) 5 MG tablet  acetaminophen (TYLENOL) 500 MG tablet  melatonin 1 MG/ML LIQD liquid  metoprolol succinate ER (TOPROL XL) 50 MG 24 hr tablet  Probiotic Product (PROBIOTIC DAILY PO)          Review of Systems   All other systems reviewed and are negative.      Physical Exam   BP: (!) 171/81  Pulse: 50  Temp: 98  F (36.7  C)  Resp: 16  Height: 180.3 cm (5' 11\")  Weight: 98.4 kg (217 lb)  SpO2: 99 %      Physical Exam  Vitals and nursing note reviewed.   Constitutional:       General: He is not in acute distress.     Appearance: Normal appearance. He is not ill-appearing.   Cardiovascular:      Rate and Rhythm: Normal rate and regular rhythm.   Pulmonary:      Effort: Pulmonary effort is normal.   Abdominal:      General: There is no distension.      Tenderness: There is no abdominal tenderness.   Musculoskeletal:         General: No swelling or tenderness.      Cervical back: Normal range of motion.   Neurological:      Mental Status: He is alert.         ED Course      "   Procedures        Results for orders placed or performed during the hospital encounter of 05/30/24 (from the past 24 hour(s))   UA with Microscopic reflex to Culture    Specimen: Urine, NOS   Result Value Ref Range    Color Urine Yellow Colorless, Straw, Light Yellow, Yellow    Appearance Urine Clear Clear    Glucose Urine Negative Negative mg/dL    Bilirubin Urine Negative Negative    Ketones Urine Negative Negative mg/dL    Specific Gravity Urine 1.015 1.003 - 1.035    Blood Urine Large (A) Negative    pH Urine 6.0 5.0 - 7.0    Protein Albumin Urine 100 (A) Negative mg/dL    Urobilinogen Urine Normal Normal, 2.0 mg/dL    Nitrite Urine Negative Negative    Leukocyte Esterase Urine Negative Negative    RBC Urine >182 (H) <=2 /HPF    WBC Urine >182 (H) <=5 /HPF    Narrative    Urine Culture ordered based on laboratory criteria       Medications - No data to display    Urine came back and patient has significant red cells and white cells in the urine.  Urine culture is pending.  Patient has no back pain or belly pain, I do not suspect kidney stone or anything like that.  Will go ahead and treat for a complicated urinary tract infection with a 10-day course of ciprofloxacin.  Recommend follow-up with his doctor in 2 weeks to make sure the urine has cleared up.  Patient was given strict return precautions    Assessments & Plan (with Medical Decision Making)  Urinary tract infection     I have reviewed the nursing notes.    I have reviewed the findings, diagnosis, plan and need for follow up with the patient.    5/30/2024   [facility] EMERGENCY DEPT       Graham Will MD  05/30/24 0906

## 2024-05-30 NOTE — ED TRIAGE NOTES
Patient presents with concerns for hematuria upon waking today. He reports no other sx and he was fine yesterday. Golfed his usual round yesterday. H/O bladder infections. Nirali Livingston RN

## 2024-05-31 LAB — BACTERIA UR CULT: ABNORMAL

## 2024-06-01 ENCOUNTER — TELEPHONE (OUTPATIENT)
Dept: EMERGENCY MEDICINE | Facility: CLINIC | Age: 74
End: 2024-06-01
Payer: COMMERCIAL

## 2024-06-01 RX ORDER — CEFPODOXIME PROXETIL 100 MG/1
100 TABLET, FILM COATED ORAL 2 TIMES DAILY
Qty: 10 TABLET | Refills: 0 | Status: SHIPPED | OUTPATIENT
Start: 2024-06-01 | End: 2024-06-06

## 2024-06-09 ENCOUNTER — HOSPITAL ENCOUNTER (EMERGENCY)
Facility: CLINIC | Age: 74
Discharge: HOME | End: 2024-06-09
Attending: FAMILY MEDICINE | Admitting: FAMILY MEDICINE
Payer: MEDICARE

## 2024-06-09 ENCOUNTER — TELEPHONE (OUTPATIENT)
Dept: FAMILY MEDICINE | Facility: CLINIC | Age: 74
End: 2024-06-09

## 2024-06-09 VITALS
RESPIRATION RATE: 18 BRPM | TEMPERATURE: 97.6 F | HEIGHT: 71 IN | SYSTOLIC BLOOD PRESSURE: 140 MMHG | HEART RATE: 50 BPM | BODY MASS INDEX: 30.38 KG/M2 | DIASTOLIC BLOOD PRESSURE: 79 MMHG | WEIGHT: 217 LBS | OXYGEN SATURATION: 99 %

## 2024-06-09 DIAGNOSIS — N39.0 URINARY TRACT INFECTION WITHOUT HEMATURIA, SITE UNSPECIFIED: ICD-10-CM

## 2024-06-09 LAB
ALBUMIN UR-MCNC: 100 MG/DL
APPEARANCE UR: ABNORMAL
BACTERIA #/AREA URNS HPF: ABNORMAL /HPF
BILIRUB UR QL STRIP: NEGATIVE
COLOR UR AUTO: YELLOW
GLUCOSE UR STRIP-MCNC: NEGATIVE MG/DL
HGB UR QL STRIP: ABNORMAL
KETONES UR STRIP-MCNC: NEGATIVE MG/DL
LEUKOCYTE ESTERASE UR QL STRIP: ABNORMAL
MUCOUS THREADS #/AREA URNS LPF: PRESENT /LPF
NITRATE UR QL: POSITIVE
PH UR STRIP: 5 [PH] (ref 5–7)
RBC URINE: >182 /HPF
SP GR UR STRIP: 1.02 (ref 1–1.03)
UROBILINOGEN UR STRIP-MCNC: NORMAL MG/DL
WBC CLUMPS #/AREA URNS HPF: PRESENT /HPF
WBC URINE: >182 /HPF

## 2024-06-09 PROCEDURE — 81001 URINALYSIS AUTO W/SCOPE: CPT | Performed by: FAMILY MEDICINE

## 2024-06-09 PROCEDURE — 87086 URINE CULTURE/COLONY COUNT: CPT | Performed by: FAMILY MEDICINE

## 2024-06-09 PROCEDURE — 87186 SC STD MICRODIL/AGAR DIL: CPT | Performed by: FAMILY MEDICINE

## 2024-06-09 PROCEDURE — 99283 EMERGENCY DEPT VISIT LOW MDM: CPT | Performed by: FAMILY MEDICINE

## 2024-06-09 PROCEDURE — 81003 URINALYSIS AUTO W/O SCOPE: CPT | Performed by: FAMILY MEDICINE

## 2024-06-09 PROCEDURE — 99284 EMERGENCY DEPT VISIT MOD MDM: CPT | Performed by: FAMILY MEDICINE

## 2024-06-09 RX ORDER — SULFAMETHOXAZOLE/TRIMETHOPRIM 800-160 MG
1 TABLET ORAL 2 TIMES DAILY
Qty: 20 TABLET | Refills: 0 | Status: SHIPPED | OUTPATIENT
Start: 2024-06-09

## 2024-06-09 ASSESSMENT — ACTIVITIES OF DAILY LIVING (ADL): ADLS_ACUITY_SCORE: 35

## 2024-06-09 NOTE — TELEPHONE ENCOUNTER
Reason for Call:  Appointment Request    Patient requesting this type of appt:  Hospital/ED Follow-Up     Requested provider: Ryan Perera    Reason patient unable to be scheduled: Not within requested timeframe    When does patient want to be seen/preferred time:  3-5 days    Comments: Ed/fu: 6/9/2024   [facility] Emergency Dept   Graham Will MD  Urinary tract infection without hematuria, site unspecified, UTI    Could we send this information to you in [facility] or would you prefer to receive a phone call?:   Patient would prefer a phone call   Okay to leave a detailed message?: Yes at Cell number on file:    Telephone Information:   Mobile [phone]       Call taken on 6/9/2024 at 7:37 AM by ERICK IBARRA

## 2024-06-09 NOTE — ED PROVIDER NOTES
History     Chief Complaint   Patient presents with    UTI     HPI  Dav Seay is a 74 year old male who presents with concerns of recurrent urinary tract infection symptoms.  Patient was seen here about 10 days ago for this and was diagnosed with a UTI.  Patient was put on Cipro but the urine culture came back showed E. coli that was resistant to that, patient was changed to cefpodoxime but only put on a 5-day course.  Patient was starting to get better and then when he finished, yesterday started having the symptoms coming back again.  Denies any belly pain or back pain.  Denies any fevers or chills.  Denies any nausea or any vomiting.    Allergies:  No Known Allergies    Problem List:    Patient Active Problem List    Diagnosis Date Noted    Bradycardia 03/27/2024     Priority: Medium     Mar 21, 2016 Entered By: HELLEN REESE Comment: Likely  Beta Blocker to discuss with Dr. Perera PCP      Sensorineural hearing loss of combined sites 03/27/2024     Priority: Medium    Sleep apnea, unspecified 03/27/2024     Priority: Medium    Tinnitus 03/27/2024     Priority: Medium    Carpal tunnel syndrome of left wrist 03/27/2024     Priority: Medium     Mar 16, 2017 Entered By: HELLEN REESE Comment: March 2017 ordered a brace      HTN (hypertension) 03/27/2024     Priority: Medium     Mar 21, 2016 Entered By: HELLEN REESE Comment: Since 2012 has been on Atenolol      Hyperlipidemia LDL goal <100 12/14/2022     Priority: Medium    Atrial fibrillation with RVR (H) 10/25/2021     Priority: Medium    Pain in left wrist 03/10/2021     Priority: Medium    Central sleep apnea syndrome 04/28/2020     Priority: Medium    Obstructive sleep apnea syndrome in adult 04/28/2020     Priority: Medium    REM sleep behavior disorder 04/28/2020     Priority: Medium    Sleep apnea 01/01/2020     Priority: Medium     Mar 29, 2021 Entered By: TAMIKO JESUS Comment: sas  at [facility]      CKD (chronic  kidney disease) stage 3, GFR 30-59 ml/min (H) 01/08/2018     Priority: Medium    Primary osteoarthritis of right hip 12/05/2017     Priority: Medium    Insomnia, transient 12/02/2013     Priority: Medium    Advance Care Planning 11/30/2012     Priority: Medium     Advance Care Planning 9/16/2015: Receipt of ACP document:  Received: Health Care Directive which was witnessed or notarized on 8/29/15.  Document not previously scanned.  Validation form completed and sent with document to be scanned.  Code Status needs to be updated to reflect choices in most recent ACP document. Confirmed/documented designated decision maker(s).  Added by Tara Davis, RN, BSN, MA, Advance Care Planning Liaison.  Advance Care Planning 11/30/12: Patient states has Advance Directive and will bring in a copy to clinic. 11/30/2012  Henri/FLOR         Hypertension goal BP (blood pressure) < 140/90 02/15/2012     Priority: Medium    History of total hip replacement 12/20/2011     Priority: Medium     Overview:    Left      Osteoarthritis of hip 12/20/2011     Priority: Medium     Mar 16, 2017 Entered By: HELLEN REESE Comment: Takes celebrex      CARDIOVASCULAR SCREENING; LDL GOAL LESS THAN 160 10/31/2010     Priority: Medium        Past Medical History:    Past Medical History:   Diagnosis Date    Arthritis 2007    Hypertension     Traumatic amputation of other finger(s) (complete) (partial), without mention of complication 1975       Past Surgical History:    Past Surgical History:   Procedure Laterality Date    COLONOSCOPY  6/10/2013    Procedure: COLONOSCOPY;  Colonoscopy;  Surgeon: Wes Pablo MD;  Location:  GI    COLONOSCOPY N/A 5/20/2019    Procedure: COLONOSCOPY;  Surgeon: Jesus Harris MD;  Location:  GI    HC REMOVAL OF TONSILS,<11 Y/O  5 years old    Tonsils <14y.o.    HERNIA REPAIR, INGUINAL RT/LT      HERNIORRHAPHY INGUINAL Left 1/10/2024    Procedure: HERNIORRHAPHY, LEFT INGUINAL, OPEN;   "Surgeon: Lazaro Gordon MD;  Location: [facility] OR    JOINT REPLACEMENT, HIP RT/LT  12/19/11    Left total hip arthroplasty. [facility]    JOINT REPLACEMENT, HIP RT/LT  12/18/2017    Right tota hip arthroplasty. [facility]    RELEASE CARPAL TUNNEL Left 10/18/2017    Procedure: RELEASE CARPAL TUNNEL;  Left Carpal Tunnel Release;  Surgeon: Norris Townsend MD;  Location: [facility] OR    [facility]HC COLONOSCOPY THRU STOMA W BIOPSY/CAUTERY TUMOR/POLYP/LESION  2003    hyperplastic polyp repeat in 2008.       Family History:    Family History   Problem Relation Age of Onset    Heart Disease Mother         Tachycardia    Osteoporosis Mother     Diabetes Father     Cancer Father         skin, lymph and colon CA    Colon Cancer Father     Arthritis Brother     Anesthesia Reaction No family hx of        Social History:  Marital Status:   [2]  Social History     Tobacco Use    Smoking status: Never     Passive exposure: Never    Smokeless tobacco: Never   Vaping Use    Vaping status: Never Used   Substance Use Topics    Alcohol use: Yes     Comment: occasional    Drug use: No        Medications:    sulfamethoxazole-trimethoprim (BACTRIM DS) 800-160 MG tablet  acetaminophen (TYLENOL) 500 MG tablet  aspirin (ASA) 81 MG EC tablet  atorvastatin (LIPITOR) 20 MG tablet  ciprofloxacin (CIPRO) 500 MG tablet  lisinopril (ZESTRIL) 5 MG tablet  melatonin 1 MG/ML LIQD liquid  metoprolol succinate ER (TOPROL XL) 50 MG 24 hr tablet  Probiotic Product (PROBIOTIC DAILY PO)          Review of Systems   All other systems reviewed and are negative.      Physical Exam   BP: (!) 140/79  Pulse: 50  Temp: 97.6  F (36.4  C)  Resp: 18  Height: 180.3 cm (5' 11\")  Weight: 98.4 kg (217 lb)  SpO2: 99 %      Physical Exam  Vitals and nursing note reviewed.   Constitutional:       General: He is not in acute distress.     Appearance: Normal appearance. He is not ill-appearing.   Cardiovascular:      Rate and Rhythm: Normal rate.      Pulses: Normal " pulses.   Pulmonary:      Effort: Pulmonary effort is normal.      Breath sounds: Normal breath sounds.   Abdominal:      General: Abdomen is flat. There is no distension.      Tenderness: There is no abdominal tenderness. There is no guarding.   Neurological:      Mental Status: He is alert.         ED Course        Procedures        Results for orders placed or performed during the hospital encounter of 06/09/24 (from the past 24 hour(s))   UA with Microscopic reflex to Culture    Specimen: Urine, Clean Catch   Result Value Ref Range    Color Urine Yellow Colorless, Straw, Light Yellow, Yellow    Appearance Urine Cloudy (A) Clear    Glucose Urine Negative Negative mg/dL    Bilirubin Urine Negative Negative    Ketones Urine Negative Negative mg/dL    Specific Gravity Urine 1.018 1.003 - 1.035    Blood Urine Large (A) Negative    pH Urine 5.0 5.0 - 7.0    Protein Albumin Urine 100 (A) Negative mg/dL    Urobilinogen Urine Normal Normal, 2.0 mg/dL    Nitrite Urine Positive (A) Negative    Leukocyte Esterase Urine Large (A) Negative    Bacteria Urine Many (A) None Seen /HPF    WBC Clumps Urine Present (A) None Seen /HPF    Mucus Urine Present (A) None Seen /LPF    RBC Urine >182 (H) <=2 /HPF    WBC Urine >182 (H) <=5 /HPF    Narrative    Urine Culture ordered based on laboratory criteria       Medications - No data to display    Urine came back and does show findings consistent with a urinary tract infection again.  I am wondering if the symptoms came back because he was only on a 5-day course of the cefpodoxime.  We discussed going back on that or doing a different antibiotic, I think doing a different course of an antibiotic and doing a longer, 10-day course would be more beneficial.  Will treat the patient with a course of Bactrim for the next 10 days.  I will send this urine off for culture again.  Patient will follow-up with his regular doctor this week since he has had recurrent infections, not sure if he needs a  referral to urology or more investigation on why he is getting these recurrent infections.    Assessments & Plan (with Medical Decision Making)  Urinary tract infection     I have reviewed the nursing notes.    I have reviewed the findings, diagnosis, plan and need for follow up with the patient.        Discharge Medication List as of 6/9/2024  7:26 AM        START taking these medications    Details   sulfamethoxazole-trimethoprim (BACTRIM DS) 800-160 MG tablet Take 1 tablet by mouth 2 times daily, Disp-20 tablet, R-0, E-Prescribe             Final diagnoses:   Urinary tract infection without hematuria, site unspecified       6/9/2024   [facility] EMERGENCY DEPT       Graham Will MD  06/09/24 0730

## 2024-06-09 NOTE — ED TRIAGE NOTES
Pt states here for concerns of recurrent UTI. States dx 10 days ago roughly, completed full course of abx. Since yesterday states hematuria and burning. Denies abdominal pain, flank pain, or other related concerns. Pt appears well, NAD noted during triage.     Triage Assessment (Adult)       Row Name 06/09/24 0624          Triage Assessment    Airway WDL WDL        Respiratory WDL    Respiratory WDL WDL        Skin Circulation/Temperature WDL    Skin Circulation/Temperature WDL WDL        Cardiac WDL    Cardiac WDL WDL        Peripheral/Neurovascular WDL    Peripheral Neurovascular WDL WDL        Cognitive/Neuro/Behavioral WDL    Cognitive/Neuro/Behavioral WDL WDL

## 2024-06-10 NOTE — TELEPHONE ENCOUNTER
OK for workin ROXANNE in 5-7 days. Please call patient  to schedule time.  Electronically signed by Ryan Perera MD

## 2024-06-11 ENCOUNTER — TELEPHONE (OUTPATIENT)
Dept: NURSING | Facility: CLINIC | Age: 74
End: 2024-06-11
Payer: COMMERCIAL

## 2024-06-11 LAB — BACTERIA UR CULT: ABNORMAL

## 2024-06-11 NOTE — TELEPHONE ENCOUNTER
[facility]    Reason for call: Lab Result Notification     Lab Result (including Rx patient on, if applicable).  If culture, copy of lab report at bottom.  Lab Result: Urine Culture  6/9/24 prescribed Sulfamethoxazole-trimethoprim (BACTRIM DS) 800-160 MG tablet Take 1 tablet by mouth 2 times daily - Oral   Creatinine Level (mg/dl)   Creatinine   Date Value Ref Range Status   12/13/2023 1.47 (H) 0.67 - 1.17 mg/dL Final   03/03/2020 1.38 (H) 0.66 - 1.25 mg/dL Final    Creatinine clearance (ml/min), if applicable    Serum creatinine: 1.47 mg/dL (H) 12/13/23 1321  Estimated creatinine clearance: 54.3 mL/min (A)     Patient's current Symptoms:   Unable to assess, VM left  MyChart Message sent    6/11/24/ 9:26 am pt returned call.  Symptoms have improved, some stinging in penis with voiding, no more blood in urine, pt states he feels much better.    RN Recommendations/Instructions per [place] ED lab result protocol:   [facility] ED lab result protocol utilized: Urine Culture    Patient/care giver notified to contact your PCP clinic or return to the Emergency department if your:  Symptoms return.  Symptoms do not improve after 3 days on antibiotic.  Symptoms do not resolve after completing antibiotic.  Symptoms worsen or other concerning symptoms.    Elsa Huggins RN

## 2024-06-11 NOTE — LETTER
June 11, 2024        Dav Seay  [address] [address]          Dear Dav Seay:    You were seen in the [facility] Emergency Department at [facility] on 6/9/2024.  We are unable to reach you by phone, so we are sending you this letter.     It is important that you call [facility] Emergency Department lab result nurse at [phone], as we have information to relay to you AND/OR we MAY have to make some changes in your treatment.    Best time to call back is between 9AM and 5:30PM, 7 days a week.      Sincerely,     [facility] Emergency Department Lab Result RN  [phone]

## 2024-06-17 ENCOUNTER — OFFICE VISIT (OUTPATIENT)
Dept: FAMILY MEDICINE | Facility: CLINIC | Age: 74
End: 2024-06-17
Payer: COMMERCIAL

## 2024-06-17 VITALS
WEIGHT: 218.8 LBS | OXYGEN SATURATION: 98 % | DIASTOLIC BLOOD PRESSURE: 70 MMHG | HEART RATE: 46 BPM | RESPIRATION RATE: 12 BRPM | TEMPERATURE: 97.7 F | SYSTOLIC BLOOD PRESSURE: 116 MMHG | BODY MASS INDEX: 30.63 KG/M2 | HEIGHT: 71 IN

## 2024-06-17 DIAGNOSIS — N39.0 RECURRENT UTI: Primary | ICD-10-CM

## 2024-06-17 PROCEDURE — 99213 OFFICE O/P EST LOW 20 MIN: CPT | Performed by: FAMILY MEDICINE

## 2024-06-17 RX ORDER — RESPIRATORY SYNCYTIAL VIRUS VACCINE 120MCG/0.5
0.5 KIT INTRAMUSCULAR ONCE
Qty: 1 EACH | Refills: 0 | Status: CANCELLED | OUTPATIENT
Start: 2024-06-17 | End: 2024-06-17

## 2024-06-17 ASSESSMENT — PAIN SCALES - GENERAL: PAINLEVEL: NO PAIN (0)

## 2024-06-17 NOTE — PROGRESS NOTES
"  Assessment & Plan     Recurrent UTI  Dav is a 74-year-old male comes in clinic today for follow-up of urinary tract infection.  He was seen in the emergency department initially on 5/30/2024 for dysuria and gross hematuria.  He was diagnosed with urinary tract infection and started on ciprofloxacin.  Unfortunately the culture came back resistant to ciprofloxacin and he was switched to a second generation cephalosporin for 5 days.  He completed the 5 days of antibiotics but immediately had recurrence of symptoms with hematuria again.  He was seen in the emergency department in follow-up on 6/9/2024.  At that time his urine was recultured and again grows out E. coli with resistance to ciprofloxacin.  He was put on Bactrim twice daily for 10 days.  He still has 2 days of antibiotics left.  He has no symptoms of dysuria but does notice that he is up a couple times a night to urinate because he is pushing fluids throughout the day.    On exam he is well-appearing in no distress.  Blood pressure 116/70.  He is afebrile.    Assessment recurrent UTI versus partially treated.  Continue the Bactrim as planned for 10 days then recheck urine again in 1 week.  - UA Macroscopic with reflex to Microscopic and Culture - Lab Collect; Future  - PRIMARY CARE FOLLOW-UP SCHEDULING; Future        MED REC REQUIRED  Post Medication Reconciliation Status: discharge medications reconciled, continue medications without change  BMI  Estimated body mass index is 30.52 kg/m  as calculated from the following:    Height as of this encounter: 1.803 m (5' 11\").    Weight as of this encounter: 99.2 kg (218 lb 12.8 oz).          Follow-up Visit   Expected date:  Jun 24, 2024 (Approximate)      Follow Up Appointment Details:     Follow-up with whom?: Other Primary Care Services    Follow-Up for what?: Lab Visit    How?: In Person                     Subjective   Dav is a 74 year old, presenting for the following health issues:  ER F/U        " 6/17/2024     3:20 PM   Additional Questions   Roomed by Leighton Garcia CMA     HPI       ED/UC Followup:    Facility:  [facility] Emergency Dept  Date of visit: 6/9/24  Reason for visit: UTI  Current Status: Pt is feeling better no Sx's    Chief Complaint   Patient presents with    UTI      HPI  Dav Seay is a 74 year old male who presents with concerns of recurrent urinary tract infection symptoms.  Patient was seen here about 10 days ago for this and was diagnosed with a UTI.  Patient was put on Cipro but the urine culture came back showed E. coli that was resistant to that, patient was changed to cefpodoxime but only put on a 5-day course.  Patient was starting to get better and then when he finished, yesterday started having the symptoms coming back again.  Denies any belly pain or back pain.  Denies any fevers or chills.  Denies any nausea or any vomiting.     Allergies:  Allergies   No Known Allergies        Problem List:          Patient Active Problem List     Diagnosis Date Noted    Bradycardia 03/27/2024       Priority: Medium       Mar 21, 2016 Entered By: HELLEN REESE Comment: Likely  Beta Blocker to discuss with Dr. Perera PCP       Sensorineural hearing loss of combined sites 03/27/2024       Priority: Medium    Sleep apnea, unspecified 03/27/2024       Priority: Medium    Tinnitus 03/27/2024       Priority: Medium    Carpal tunnel syndrome of left wrist 03/27/2024       Priority: Medium       Mar 16, 2017 Entered By: HELLEN REESE Comment: March 2017 ordered a brace       HTN (hypertension) 03/27/2024       Priority: Medium       Mar 21, 2016 Entered By: HELLEN REESE Comment: Since 2012 has been on Atenolol       Hyperlipidemia LDL goal <100 12/14/2022       Priority: Medium    Atrial fibrillation with RVR (H) 10/25/2021       Priority: Medium    Pain in left wrist 03/10/2021       Priority: Medium    Central sleep apnea syndrome 04/28/2020        Priority: Medium    Obstructive sleep apnea syndrome in adult 04/28/2020       Priority: Medium    REM sleep behavior disorder 04/28/2020       Priority: Medium    Sleep apnea 01/01/2020       Priority: Medium       Mar 29, 2021 Entered By: TAMIKO JESUS Comment: sas  at [facility]       CKD (chronic kidney disease) stage 3, GFR 30-59 ml/min (H) 01/08/2018       Priority: Medium    Primary osteoarthritis of right hip 12/05/2017       Priority: Medium    Insomnia, transient 12/02/2013       Priority: Medium    Advance Care Planning 11/30/2012       Priority: Medium       Advance Care Planning 9/16/2015: Receipt of ACP document:  Received: Health Care Directive which was witnessed or notarized on 8/29/15.  Document not previously scanned.  Validation form completed and sent with document to be scanned.  Code Status needs to be updated to reflect choices in most recent ACP document. Confirmed/documented designated decision maker(s).  Added by Tara Davis, RN, BSN, MA, Advance Care Planning Liaison.  Advance Care Planning 11/30/12: Patient states has Advance Directive and will bring in a copy to clinic. 11/30/2012  Henri/FLOR           Hypertension goal BP (blood pressure) < 140/90 02/15/2012       Priority: Medium    History of total hip replacement 12/20/2011       Priority: Medium       Overview:    Left       Osteoarthritis of hip 12/20/2011       Priority: Medium       Mar 16, 2017 Entered By: HELLEN REESE Comment: Takes celebrex       CARDIOVASCULAR SCREENING; LDL GOAL LESS THAN 160 10/31/2010       Priority: Medium         Past Medical History:         Past Medical History:   Diagnosis Date    Arthritis 2007    Hypertension      Traumatic amputation of other finger(s) (complete) (partial), without mention of complication 1975               Past Surgical History:    Past Surgical History         Past Surgical History:   Procedure Laterality Date    COLONOSCOPY   6/10/2013     Procedure:  COLONOSCOPY;  Colonoscopy;  Surgeon: Wes Pablo MD;  Location: [facility]    COLONOSCOPY N/A 5/20/2019     Procedure: COLONOSCOPY;  Surgeon: Jesus Harris MD;  Location: [facility]    HC REMOVAL OF TONSILS,<11 Y/O   5 years old     Tonsils <14y.o.    HERNIA REPAIR, INGUINAL RT/LT        HERNIORRHAPHY INGUINAL Left 1/10/2024     Procedure: HERNIORRHAPHY, LEFT INGUINAL, OPEN;  Surgeon: Lazaro Gordon MD;  Location: [facility] OR    JOINT REPLACEMENT, HIP RT/LT   12/19/11     Left total hip arthroplasty. [facility]    JOINT REPLACEMENT, HIP RT/LT   12/18/2017     Right tota hip arthroplasty. [facility]    RELEASE CARPAL TUNNEL Left 10/18/2017     Procedure: RELEASE CARPAL TUNNEL;  Left Carpal Tunnel Release;  Surgeon: Norris Townsend MD;  Location: [facility] OR    ZZHC COLONOSCOPY THRU STOMA W BIOPSY/CAUTERY TUMOR/POLYP/LESION   2003     hyperplastic polyp repeat in 2008.            Family History:    Family History         Family History   Problem Relation Age of Onset    Heart Disease Mother           Tachycardia    Osteoporosis Mother      Diabetes Father      Cancer Father           skin, lymph and colon CA    Colon Cancer Father      Arthritis Brother      Anesthesia Reaction No family hx of              Social History:  Marital Status:   [2]  Social History               Tobacco Use    Smoking status: Never       Passive exposure: Never    Smokeless tobacco: Never   Vaping Use    Vaping status: Never Used   Substance Use Topics    Alcohol use: Yes       Comment: occasional    Drug use: No            Medications:      sulfamethoxazole-trimethoprim (BACTRIM DS) 800-160 MG tablet     acetaminophen (TYLENOL) 500 MG tablet  aspirin (ASA) 81 MG EC tablet  atorvastatin (LIPITOR) 20 MG tablet  ciprofloxacin (CIPRO) 500 MG tablet  lisinopril (ZESTRIL) 5 MG tablet  melatonin 1 MG/ML LIQD liquid  metoprolol succinate ER (TOPROL XL) 50 MG 24 hr tablet  Probiotic Product (PROBIOTIC DAILY PO)           "     Review of Systems   All other systems reviewed and are negative.        Physical Exam   BP: (!) 140/79  Pulse: 50  Temp: 97.6  F (36.4  C)  Resp: 18  Height: 180.3 cm (5' 11\")  Weight: 98.4 kg (217 lb)  SpO2: 99 %        Physical Exam  Vitals and nursing note reviewed.   Constitutional:       General: He is not in acute distress.     Appearance: Normal appearance. He is not ill-appearing.   Cardiovascular:      Rate and Rhythm: Normal rate.      Pulses: Normal pulses.   Pulmonary:      Effort: Pulmonary effort is normal.      Breath sounds: Normal breath sounds.   Abdominal:      General: Abdomen is flat. There is no distension.      Tenderness: There is no abdominal tenderness. There is no guarding.   Neurological:      Mental Status: He is alert.            ED Course      Procedures                 Results for orders placed or performed during the hospital encounter of 06/09/24 (from the past 24 hour(s))   UA with Microscopic reflex to Culture     Specimen: Urine, Clean Catch   Result Value Ref Range     Color Urine Yellow Colorless, Straw, Light Yellow, Yellow     Appearance Urine Cloudy (A) Clear     Glucose Urine Negative Negative mg/dL     Bilirubin Urine Negative Negative     Ketones Urine Negative Negative mg/dL     Specific Gravity Urine 1.018 1.003 - 1.035     Blood Urine Large (A) Negative     pH Urine 5.0 5.0 - 7.0     Protein Albumin Urine 100 (A) Negative mg/dL     Urobilinogen Urine Normal Normal, 2.0 mg/dL     Nitrite Urine Positive (A) Negative     Leukocyte Esterase Urine Large (A) Negative     Bacteria Urine Many (A) None Seen /HPF     WBC Clumps Urine Present (A) None Seen /HPF     Mucus Urine Present (A) None Seen /LPF     RBC Urine >182 (H) <=2 /HPF     WBC Urine >182 (H) <=5 /HPF     Narrative     Urine Culture ordered based on laboratory criteria         Medications - No data to display     Urine came back and does show findings consistent with a urinary tract infection again.  I am " "wondering if the symptoms came back because he was only on a 5-day course of the cefpodoxime.  We discussed going back on that or doing a different antibiotic, I think doing a different course of an antibiotic and doing a longer, 10-day course would be more beneficial.  Will treat the patient with a course of Bactrim for the next 10 days.  I will send this urine off for culture again.  Patient will follow-up with his regular doctor this week since he has had recurrent infections, not sure if he needs a referral to urology or more investigation on why he is getting these recurrent infections.     Assessments & Plan (with Medical Decision Making)  Urinary tract infection      I have reviewed the nursing notes.     I have reviewed the findings, diagnosis, plan and need for follow up with the patient.           Discharge Medication List as of 6/9/2024  7:26 AM              START taking these medications     Details   sulfamethoxazole-trimethoprim (BACTRIM DS) 800-160 MG tablet Take 1 tablet by mouth 2 times daily, Disp-20 tablet, R-0, E-Prescribe                 Final diagnoses:   Urinary tract infection without hematuria, site unspecified         6/9/2024   [facility] EMERGENCY DEPT        Suman, Graham Mera MD  06/09/24 0740      Review of Systems  CONSTITUTIONAL: NEGATIVE for fever, chills, change in weight  ENT/MOUTH: NEGATIVE for ear, mouth and throat problems  RESP: NEGATIVE for significant cough or SOB  CV: NEGATIVE for chest pain, palpitations or peripheral edema  : nocturia x 2  ROS otherwise negative      Objective    /70 (BP Location: Left arm, Patient Position: Chair, Cuff Size: Adult Large)   Pulse (!) 46   Temp 97.7  F (36.5  C) (Temporal)   Resp 12   Ht 1.803 m (5' 11\")   Wt 99.2 kg (218 lb 12.8 oz)   SpO2 98%   BMI 30.52 kg/m    Body mass index is 30.52 kg/m .  Physical Exam   GENERAL: alert and no distress  NECK: no adenopathy, no asymmetry, masses, or scars  RESP: " lungs clear to auscultation - no rales, rhonchi or wheezes  CV: regular rate and rhythm, normal S1 S2, no S3 or S4, no murmur, click or rub, no peripheral edema  ABDOMEN: soft, nontender, no hepatosplenomegaly, no masses and bowel sounds normal  MS: no gross musculoskeletal defects noted, no edema    Admission on 06/09/2024, Discharged on 06/09/2024   Component Date Value Ref Range Status    Color Urine 06/09/2024 Yellow  Colorless, Straw, Light Yellow, Yellow Final    Appearance Urine 06/09/2024 Cloudy (A)  Clear Final    Glucose Urine 06/09/2024 Negative  Negative mg/dL Final    Bilirubin Urine 06/09/2024 Negative  Negative Final    Ketones Urine 06/09/2024 Negative  Negative mg/dL Final    Specific Gravity Urine 06/09/2024 1.018  1.003 - 1.035 Final    Blood Urine 06/09/2024 Large (A)  Negative Final    pH Urine 06/09/2024 5.0  5.0 - 7.0 Final    Protein Albumin Urine 06/09/2024 100 (A)  Negative mg/dL Final    Urobilinogen Urine 06/09/2024 Normal  Normal, 2.0 mg/dL Final    Nitrite Urine 06/09/2024 Positive (A)  Negative Final    Leukocyte Esterase Urine 06/09/2024 Large (A)  Negative Final    Bacteria Urine 06/09/2024 Many (A)  None Seen /HPF Final    WBC Clumps Urine 06/09/2024 Present (A)  None Seen /HPF Final    Mucus Urine 06/09/2024 Present (A)  None Seen /LPF Final    RBC Urine 06/09/2024 >182 (H)  <=2 /HPF Final    WBC Urine 06/09/2024 >182 (H)  <=5 /HPF Final    Culture 06/09/2024 >100,000 CFU/mL Escherichia coli (A)   Final           Signed Electronically by: Ryan Perera MD

## 2024-06-24 ENCOUNTER — LAB (OUTPATIENT)
Dept: LAB | Facility: CLINIC | Age: 74
End: 2024-06-24
Payer: COMMERCIAL

## 2024-06-24 DIAGNOSIS — N39.0 RECURRENT UTI: ICD-10-CM

## 2024-06-24 LAB
ALBUMIN UR-MCNC: NEGATIVE MG/DL
APPEARANCE UR: CLEAR
BILIRUB UR QL STRIP: NEGATIVE
COLOR UR AUTO: YELLOW
GLUCOSE UR STRIP-MCNC: NEGATIVE MG/DL
HGB UR QL STRIP: NEGATIVE
KETONES UR STRIP-MCNC: NEGATIVE MG/DL
LEUKOCYTE ESTERASE UR QL STRIP: NEGATIVE
NITRATE UR QL: NEGATIVE
PH UR STRIP: 5 [PH] (ref 5–7)
SP GR UR STRIP: 1.02 (ref 1–1.03)
UROBILINOGEN UR STRIP-MCNC: NORMAL MG/DL

## 2024-06-24 PROCEDURE — 81003 URINALYSIS AUTO W/O SCOPE: CPT

## 2024-08-03 ENCOUNTER — HEALTH MAINTENANCE LETTER (OUTPATIENT)
Age: 74
End: 2024-08-03

## 2024-10-03 ENCOUNTER — IMMUNIZATION (OUTPATIENT)
Dept: FAMILY MEDICINE | Facility: CLINIC | Age: 74
End: 2024-10-03
Payer: COMMERCIAL

## 2024-10-03 PROCEDURE — 90662 IIV NO PRSV INCREASED AG IM: CPT

## 2024-10-03 PROCEDURE — 90480 ADMN SARSCOV2 VAC 1/ONLY CMP: CPT

## 2024-10-03 PROCEDURE — G0008 ADMIN INFLUENZA VIRUS VAC: HCPCS

## 2024-10-03 PROCEDURE — 91320 SARSCV2 VAC 30MCG TRS-SUC IM: CPT

## 2024-10-31 ENCOUNTER — MYC MEDICAL ADVICE (OUTPATIENT)
Dept: FAMILY MEDICINE | Facility: CLINIC | Age: 74
End: 2024-10-31
Payer: COMMERCIAL

## 2024-10-31 ENCOUNTER — PATIENT OUTREACH (OUTPATIENT)
Dept: FAMILY MEDICINE | Facility: CLINIC | Age: 74
End: 2024-10-31
Payer: COMMERCIAL

## 2024-10-31 DIAGNOSIS — Z12.11 SPECIAL SCREENING FOR MALIGNANT NEOPLASMS, COLON: Primary | ICD-10-CM

## 2024-10-31 NOTE — TELEPHONE ENCOUNTER
Patient Quality Outreach    Patient is due for the following:   Colon Cancer Screening  Physical Annual Wellness Visit    Next Steps:   Schedule a Annual Wellness Visit    Type of outreach:    Sent BusyFlow message.      Questions for provider review:    None           Hoa Quiroga MA

## 2024-11-08 ENCOUNTER — TELEPHONE (OUTPATIENT)
Dept: GASTROENTEROLOGY | Facility: CLINIC | Age: 74
End: 2024-11-08
Payer: COMMERCIAL

## 2024-11-08 DIAGNOSIS — Z12.11 SPECIAL SCREENING FOR MALIGNANT NEOPLASMS, COLON: Primary | ICD-10-CM

## 2024-11-08 NOTE — TELEPHONE ENCOUNTER
"Endoscopy Scheduling Screen    Have you had any respiratory illness or flu-like symptoms in the last 10 days?  No    What is your communication preference for Instructions and/or Bowel Prep?   MyChart    What insurance is in the chart?  Other:  BCBS    Ordering/Referring Provider: LOAN PRYOR   (If ordering provider performs procedure, schedule with ordering provider unless otherwise instructed. )    BMI: Estimated body mass index is 30.52 kg/m  as calculated from the following:    Height as of 6/17/24: 1.803 m (5' 11\").    Weight as of 6/17/24: 99.2 kg (218 lb 12.8 oz).     Sedation Ordered  moderate sedation.   If patient BMI > 50 do not schedule in [facility].    If patient BMI > 45 do not schedule at [facility].    Are you taking methadone or Suboxone?  NO, No RN review required.    Have you been diagnosed and are being treated for severe PTSD or severe anxiety?  NO, No RN review required.    Are you taking any prescription medications for pain 3 or more times per week?   NO, No RN review required.    Do you have a history of malignant hyperthermia?  No    (Females) Are you currently pregnant?   No     Have you been diagnosed or told you have pulmonary hypertension?   No    Do you have an LVAD?  No    Have you been told you have moderate to severe sleep apnea?  Yes. Do you use a CPAP? Yes. (RN Review required for scheduling unless scheduling in Hospital.)     Have you been told you have COPD, asthma, or any other lung disease?  No    Do you have any heart conditions?  No     Have you ever had or are you waiting for an organ transplant?  No. Continue scheduling, no site restrictions.    Have you had a stroke or transient ischemic attack (TIA aka \"mini stroke\" in the last 6 months?   No    Have you been diagnosed with or been told you have cirrhosis of the liver?   No.    Are you currently on dialysis?   No    Do you need assistance transferring?   No    BMI: Estimated body mass index is 30.52 kg/m  as calculated from the " "following:    Height as of 6/17/24: 1.803 m (5' 11\").    Weight as of 6/17/24: 99.2 kg (218 lb 12.8 oz).     Is patients BMI > 40 and scheduling location [facility]?  No    Do you take an injectable or oral medication for weight loss or diabetes (excluding insulin)?  No    Do you take the medication Naltrexone?  No    Do you take blood thinners?  No       Prep   Are you currently on dialysis or do you have chronic kidney disease?  Yes (Golytely Prep)    Do you have a diagnosis of diabetes?  No    Do you have a diagnosis of cystic fibrosis (CF)?  No    On a regular basis do you go 3 -5 days between bowel movements?  No    BMI > 40?  No    Preferred Pharmacy:    [id number] - [address] [address]  Phone: [phone] Fax: [phone]    Final Scheduling Details     Procedure scheduled  Colonoscopy    Surgeon:  RAUL     Date of procedure:  11/27/24     Pre-OP / PAC:   No - Not required for this site.    Location  [facility] - Per order.    Sedation   MAC/Deep Sedation  Per location.      Patient Reminders:   You will receive a call from a Nurse to review instructions and health history.  This assessment must be completed prior to your procedure.  Failure to complete the Nurse assessment may result in the procedure being cancelled.      On the day of your procedure, please designate an adult(s) who can drive you home stay with you for the next 24 hours. The medicines used in the exam will make you sleepy. You will not be able to drive.      You cannot take public transportation, ride share services, or non-medical taxi service without a responsible caregiver.  Medical transport services are allowed with the requirement that a responsible caregiver will receive you at your destination.  We require that drivers and caregivers are confirmed prior to your procedure.    "

## 2024-11-11 RX ORDER — BISACODYL 5 MG/1
TABLET, DELAYED RELEASE ORAL
Qty: 4 TABLET | Refills: 0 | Status: SHIPPED | OUTPATIENT
Start: 2024-11-11

## 2024-11-11 NOTE — TELEPHONE ENCOUNTER
Standard Golytely Bowel Prep recommended due to CKD noted.  Instructions were sent via WebVisible. Bowel prep was sent 11/11/2024 to    [id number] - [place], [place] - [address] [place] [address]    Ne Ba LPN

## 2024-11-13 RX ORDER — CEFPODOXIME PROXETIL 200 MG/1
TABLET, FILM COATED ORAL
COMMUNITY
Start: 2024-06-01 | End: 2024-11-22

## 2024-11-19 NOTE — LETTER
1/18/2024         RE: Dav Seay  [address] [facility] [address]        Dear Colleague,    Thank you for referring your patient, Dav Seay, to the [facility]. Please see a copy of my visit note below.    Follow-up for open left inguinal hernia repair      Subjective:  Patient feels good.  No complaints or concerns.    Objective:  B/P: 110/62, T: 96.3, P: Data Unavailable, R: Data Unavailable  Abdomen: Incision healing well.  Hernia repair intact.    Assessment/plan:  Patient is status post open left inguinal hernia repair.  Doing well.  He will gradually increase his activity as tolerated and follow-up as necessary.    Lazaro Gordon MD, FACS      Again, thank you for allowing me to participate in the care of your patient.        Sincerely,        Lazaro Gordon MD   What Type Of Note Output Would You Prefer (Optional)?: Standard Output How Severe Is Your Rash?: moderate Is This A New Presentation, Or A Follow-Up?: Rash

## 2024-11-24 SDOH — HEALTH STABILITY: PHYSICAL HEALTH: ON AVERAGE, HOW MANY MINUTES DO YOU ENGAGE IN EXERCISE AT THIS LEVEL?: 40 MIN

## 2024-11-24 SDOH — HEALTH STABILITY: PHYSICAL HEALTH: ON AVERAGE, HOW MANY DAYS PER WEEK DO YOU ENGAGE IN MODERATE TO STRENUOUS EXERCISE (LIKE A BRISK WALK)?: 5 DAYS

## 2024-11-24 ASSESSMENT — SOCIAL DETERMINANTS OF HEALTH (SDOH): HOW OFTEN DO YOU GET TOGETHER WITH FRIENDS OR RELATIVES?: MORE THAN THREE TIMES A WEEK

## 2024-11-25 ENCOUNTER — OFFICE VISIT (OUTPATIENT)
Dept: FAMILY MEDICINE | Facility: CLINIC | Age: 74
End: 2024-11-25
Payer: COMMERCIAL

## 2024-11-25 VITALS
DIASTOLIC BLOOD PRESSURE: 72 MMHG | BODY MASS INDEX: 31.21 KG/M2 | HEIGHT: 71 IN | SYSTOLIC BLOOD PRESSURE: 115 MMHG | TEMPERATURE: 97.2 F | HEART RATE: 56 BPM | WEIGHT: 222.9 LBS | RESPIRATION RATE: 14 BRPM | OXYGEN SATURATION: 97 %

## 2024-11-25 DIAGNOSIS — I48.91 ATRIAL FIBRILLATION WITH RVR (H): ICD-10-CM

## 2024-11-25 DIAGNOSIS — N18.31 STAGE 3A CHRONIC KIDNEY DISEASE (H): Primary | ICD-10-CM

## 2024-11-25 DIAGNOSIS — Z29.11 NEED FOR VACCINATION AGAINST RESPIRATORY SYNCYTIAL VIRUS: ICD-10-CM

## 2024-11-25 DIAGNOSIS — Z00.00 ENCOUNTER FOR MEDICARE ANNUAL WELLNESS EXAM: ICD-10-CM

## 2024-11-25 DIAGNOSIS — E78.5 HYPERLIPIDEMIA LDL GOAL <100: ICD-10-CM

## 2024-11-25 DIAGNOSIS — I10 HYPERTENSION GOAL BP (BLOOD PRESSURE) < 140/90: ICD-10-CM

## 2024-11-25 LAB
ANION GAP SERPL CALCULATED.3IONS-SCNC: 10 MMOL/L (ref 7–15)
BUN SERPL-MCNC: 20 MG/DL (ref 8–23)
CALCIUM SERPL-MCNC: 9 MG/DL (ref 8.8–10.4)
CHLORIDE SERPL-SCNC: 103 MMOL/L (ref 98–107)
CHOLEST SERPL-MCNC: 146 MG/DL
CREAT SERPL-MCNC: 1.4 MG/DL (ref 0.67–1.17)
CREAT UR-MCNC: 105.2 MG/DL
EGFRCR SERPLBLD CKD-EPI 2021: 53 ML/MIN/1.73M2
FASTING STATUS PATIENT QL REPORTED: NO
FASTING STATUS PATIENT QL REPORTED: NO
GLUCOSE SERPL-MCNC: 121 MG/DL (ref 70–99)
HCO3 SERPL-SCNC: 24 MMOL/L (ref 22–29)
HDLC SERPL-MCNC: 46 MG/DL
HGB BLD-MCNC: 14.4 G/DL (ref 13.3–17.7)
LDLC SERPL CALC-MCNC: 58 MG/DL
MICROALBUMIN UR-MCNC: <12 MG/L
MICROALBUMIN/CREAT UR: NORMAL MG/G{CREAT}
NONHDLC SERPL-MCNC: 100 MG/DL
POTASSIUM SERPL-SCNC: 4 MMOL/L (ref 3.4–5.3)
SODIUM SERPL-SCNC: 137 MMOL/L (ref 135–145)
TRIGL SERPL-MCNC: 211 MG/DL

## 2024-11-25 PROCEDURE — 36415 COLL VENOUS BLD VENIPUNCTURE: CPT | Performed by: FAMILY MEDICINE

## 2024-11-25 PROCEDURE — 80061 LIPID PANEL: CPT | Performed by: FAMILY MEDICINE

## 2024-11-25 PROCEDURE — 85018 HEMOGLOBIN: CPT | Performed by: FAMILY MEDICINE

## 2024-11-25 PROCEDURE — 80048 BASIC METABOLIC PNL TOTAL CA: CPT | Performed by: FAMILY MEDICINE

## 2024-11-25 PROCEDURE — 82043 UR ALBUMIN QUANTITATIVE: CPT | Performed by: FAMILY MEDICINE

## 2024-11-25 PROCEDURE — G0439 PPPS, SUBSEQ VISIT: HCPCS | Performed by: FAMILY MEDICINE

## 2024-11-25 PROCEDURE — 82570 ASSAY OF URINE CREATININE: CPT | Performed by: FAMILY MEDICINE

## 2024-11-25 PROCEDURE — 99214 OFFICE O/P EST MOD 30 MIN: CPT | Mod: 25 | Performed by: FAMILY MEDICINE

## 2024-11-25 RX ORDER — ATORVASTATIN CALCIUM 20 MG/1
20 TABLET, FILM COATED ORAL DAILY
Qty: 90 TABLET | Refills: 4 | Status: SHIPPED | OUTPATIENT
Start: 2024-11-25

## 2024-11-25 RX ORDER — LISINOPRIL 5 MG/1
5 TABLET ORAL DAILY
Qty: 90 TABLET | Refills: 4 | Status: SHIPPED | OUTPATIENT
Start: 2024-11-25

## 2024-11-25 RX ORDER — METOPROLOL SUCCINATE 50 MG/1
50 TABLET, EXTENDED RELEASE ORAL DAILY
Qty: 90 TABLET | Refills: 4 | Status: SHIPPED | OUTPATIENT
Start: 2024-11-25

## 2024-11-25 ASSESSMENT — PAIN SCALES - GENERAL: PAINLEVEL_OUTOF10: NO PAIN (0)

## 2024-11-25 NOTE — PROGRESS NOTES
Preventive Care Visit  Formerly [facility]  Ryan Perera MD, Family Medicine  Nov 25, 2024      Assessment & Plan     Encounter for Medicare annual wellness exam  Dav eSay is a 74-year-old male who presents to clinic for Medicare annual wellness visit.  His past medical history is primarily pertinent for hypertension, hyperlipidemia and chronic kidney disease stage IIIa.  Does have a distant history of atrial fibrillation with rapid ventricular response more than 10 years ago.  He has had no recurrence of this.    In general his health is good.    All age and gender specific screening recommendations were reviewed.  He is current with all recommendations.  His last colonoscopy was in May 2019.  He is on a 5-year recommended screening.  His colonoscopy is scheduled for later this week.    All vaccine recommendations and schedules were reviewed.  He is up-to-date with all seasonal vaccines including flu and COVID.  He has completed his pneumonia vaccine recommendations.  He is current with his tetanus vaccine.  We discussed risks and benefits of RSV vaccination at some point in time.    On exam this is a well-developed well-nourished elderly male in no acute distress.  His blood pressure is 115/72.  His pulse is 56 and regular.  He is afebrile.  His respiratory rate is 14 with oxygen saturations of 97%.  His weight is 222 pounds which is stable from last year.  Body mass index of 30.6.    HEENT exam: Normocephalic atraumatic.  Pupils are equally round and reactive to light and accommodation.  Extraocular muscles are intact.  TMs are clear bilaterally.  He does wear hearing aids bilaterally.  Oropharynx is clear mucous membranes are moist, tongue projects midline and palate raises symmetrically.  Neck is supple without adenopathy, thyromegaly or carotid bruits.  Lungs are clear to auscultation without wheezing rales or rhonchi.  Cardiac exam is regular without murmur rub or gallop.   Abdomen is soft, nontender, nondistended with good bowel sounds.  No appreciable organomegaly.  Extremities no clubbing cyanosis or edema.  Neurologic exam is nonfocal.  He is alert and oriented x 3.  Cranial nerves II through XII are fully intact without deficit.  Deep tendon reflexes are symmetrical in the upper and lower extremities.  Mood and affect are good.    Laboratories:  Results for orders placed or performed in visit on 11/25/24   BASIC METABOLIC PANEL     Status: Abnormal   Result Value Ref Range    Sodium 137 135 - 145 mmol/L    Potassium 4.0 3.4 - 5.3 mmol/L    Chloride 103 98 - 107 mmol/L    Carbon Dioxide (CO2) 24 22 - 29 mmol/L    Anion Gap 10 7 - 15 mmol/L    Urea Nitrogen 20.0 8.0 - 23.0 mg/dL    Creatinine 1.40 (H) 0.67 - 1.17 mg/dL    GFR Estimate 53 (L) >60 mL/min/1.73m2    Calcium 9.0 8.8 - 10.4 mg/dL    Glucose 121 (H) 70 - 99 mg/dL    Patient Fasting > 8hrs? No    Lipid panel reflex to direct LDL Non-fasting     Status: Abnormal   Result Value Ref Range    Cholesterol 146 <200 mg/dL    Triglycerides 211 (H) <150 mg/dL    Direct Measure HDL 46 >=40 mg/dL    LDL Cholesterol Calculated 58 <100 mg/dL    Non HDL Cholesterol 100 <130 mg/dL    Patient Fasting > 8hrs? No     Narrative    Cholesterol  Desirable: < 200 mg/dL  Borderline High: 200 - 239 mg/dL  High: >= 240 mg/dL    Triglycerides  Normal: < 150 mg/dL  Borderline High: 150 - 199 mg/dL  High: 200-499 mg/dL  Very High: >= 500 mg/dL    Direct Measure HDL  Female: >= 50 mg/dL   Male: >= 40 mg/dL    LDL Cholesterol  Desirable: < 100 mg/dL  Above Desirable: 100 - 129 mg/dL   Borderline High: 130 - 159 mg/dL   High:  160 - 189 mg/dL   Very High: >= 190 mg/dL    Non HDL Cholesterol  Desirable: < 130 mg/dL  Above Desirable: 130 - 159 mg/dL  Borderline High: 160 - 189 mg/dL  High: 190 - 219 mg/dL  Very High: >= 220 mg/dL   Albumin Random Urine Quantitative with Creat Ratio     Status: None   Result Value Ref Range    Creatinine Urine mg/dL 105.2  mg/dL    Albumin Urine mg/L <12.0 mg/L    Albumin Urine mg/g Cr     Hemoglobin     Status: Normal   Result Value Ref Range    Hemoglobin 14.4 13.3 - 17.7 g/dL     Hemoglobin, lipid profile remains stable.  Renal profile is stable with a slight rise of creatinine to 1.4.  Random glucose 121.    Assessment: 74-year-old male who presents to clinic for Medicare annual wellness visit.  He is current and compliant with all recommended screenings.  He is up-to-date with all recommended vaccines.  We did talk about the risks and benefits of RSV vaccination for few the future consideration.    Plan: He will complete his colonoscopy later this week.  All medications were refilled for another year.  I would like him to schedule a follow-up appointment to establish care with one of my colleagues after my care home.  He will need an annual wellness visit again in 1 year.  Encouraged him to drink plenty of fluids on a daily basis as his renal function continues to use to slowly decline and he admits that he is not very good at drinking water throughout the day.    Stage 3a chronic kidney disease (H)  Chronic, stable.  Continue current medication and plan encouraged adequate daily hydration.  - BASIC METABOLIC PANEL; Future  - Albumin Random Urine Quantitative with Creat Ratio; Future  - Hemoglobin; Future    Hyperlipidemia LDL goal <100  Chronic, stable.  Continue current medication and plan.  - Lipid panel reflex to direct LDL Non-fasting; Future  - atorvastatin (LIPITOR) 20 MG tablet; Take 1 tablet (20 mg) by mouth daily.    Hypertension goal BP (blood pressure) < 140/90  Chronic, stable.  Continue current medication and plan.  - lisinopril (ZESTRIL) 5 MG tablet; Take 1 tablet (5 mg) by mouth daily.  - metoprolol succinate ER (TOPROL XL) 50 MG 24 hr tablet; Take 1 tablet (50 mg) by mouth daily.    Atrial fibrillation with RVR (H)  Prior single episode of atrial fibrillation with rapid ventricular response.  Workup was negative  for recurrence he is currently not anticoagulated but is rate controlled with metoprolol.  He continues in a normal sinus rhythm.  Will continue to monitor for any atrial fibrillation recurrence.  - metoprolol succinate ER (TOPROL XL) 50 MG 24 hr tablet; Take 1 tablet (50 mg) by mouth daily.    Patient has been advised of split billing requirements and indicates understanding: Yes      Results for orders placed or performed in visit on 11/25/24   BASIC METABOLIC PANEL     Status: Abnormal   Result Value Ref Range    Sodium 137 135 - 145 mmol/L    Potassium 4.0 3.4 - 5.3 mmol/L    Chloride 103 98 - 107 mmol/L    Carbon Dioxide (CO2) 24 22 - 29 mmol/L    Anion Gap 10 7 - 15 mmol/L    Urea Nitrogen 20.0 8.0 - 23.0 mg/dL    Creatinine 1.40 (H) 0.67 - 1.17 mg/dL    GFR Estimate 53 (L) >60 mL/min/1.73m2    Calcium 9.0 8.8 - 10.4 mg/dL    Glucose 121 (H) 70 - 99 mg/dL    Patient Fasting > 8hrs? No    Lipid panel reflex to direct LDL Non-fasting     Status: Abnormal   Result Value Ref Range    Cholesterol 146 <200 mg/dL    Triglycerides 211 (H) <150 mg/dL    Direct Measure HDL 46 >=40 mg/dL    LDL Cholesterol Calculated 58 <100 mg/dL    Non HDL Cholesterol 100 <130 mg/dL    Patient Fasting > 8hrs? No     Narrative    Cholesterol  Desirable: < 200 mg/dL  Borderline High: 200 - 239 mg/dL  High: >= 240 mg/dL    Triglycerides  Normal: < 150 mg/dL  Borderline High: 150 - 199 mg/dL  High: 200-499 mg/dL  Very High: >= 500 mg/dL    Direct Measure HDL  Female: >= 50 mg/dL   Male: >= 40 mg/dL    LDL Cholesterol  Desirable: < 100 mg/dL  Above Desirable: 100 - 129 mg/dL   Borderline High: 130 - 159 mg/dL   High:  160 - 189 mg/dL   Very High: >= 190 mg/dL    Non HDL Cholesterol  Desirable: < 130 mg/dL  Above Desirable: 130 - 159 mg/dL  Borderline High: 160 - 189 mg/dL  High: 190 - 219 mg/dL  Very High: >= 220 mg/dL   Albumin Random Urine Quantitative with Creat Ratio     Status: None   Result Value Ref Range    Creatinine Urine mg/dL  "105.2 mg/dL    Albumin Urine mg/L <12.0 mg/L    Albumin Urine mg/g Cr     Hemoglobin     Status: Normal   Result Value Ref Range    Hemoglobin 14.4 13.3 - 17.7 g/dL           BMI  Estimated body mass index is 30.69 kg/m  as calculated from the following:    Height as of this encounter: 1.815 m (5' 11.46\").    Weight as of this encounter: 101.1 kg (222 lb 14.4 oz).       Counseling  Appropriate preventive services were addressed with this patient via screening, questionnaire, or discussion as appropriate for fall prevention, nutrition, physical activity, Tobacco-use cessation, social engagement, weight loss and cognition.  Checklist reviewing preventive services available has been given to the patient.  Reviewed patient's diet, addressing concerns and/or questions.   The patient was provided with written information regarding signs of hearing loss.       FUTURE APPOINTMENTS:       - Follow-up visit in 6 months to establish primary care after my skilled nursing       - Follow-up for annual visit or as needed  Work on weight loss  Regular exercise    Joshua Demarco is a 74 year old, presenting for the following:  Wellness Visit        11/25/2024    12:57 PM   Additional Questions   Roomed by [facility]      Hyperlipidemia Follow-Up    Are you regularly taking any medication or supplement to lower your cholesterol?   Yes- Lipitor  Are you having muscle aches or other side effects that you think could be caused by your cholesterol lowering medication?  No    Hypertension Follow-up    Do you check your blood pressure regularly outside of the clinic? Yes   Are you following a low salt diet? Yes  Are your blood pressures ever more than 140 on the top number (systolic) OR more   than 90 on the bottom number (diastolic), for example 140/90? No    Health Care Directive  Patient has a Health Care Directive on file  Advance care planning document is on file and is current.      11/24/2024   General Health   How would " you rate your overall physical health? Excellent   Feel stress (tense, anxious, or unable to sleep) Only a little      (!) STRESS CONCERN      11/24/2024   Nutrition   Diet: Regular (no restrictions)            11/24/2024   Exercise   Days per week of moderate/strenous exercise 5 days   Average minutes spent exercising at this level 40 min            11/24/2024   Social Factors   Frequency of gathering with friends or relatives More than three times a week   Worry food won't last until get money to buy more No   Food not last or not have enough money for food? No   Do you have housing? (Housing is defined as stable permanent housing and does not include staying ouside in a car, in a tent, in an abandoned building, in an overnight shelter, or couch-surfing.) Yes   Are you worried about losing your housing? No   Lack of transportation? No   Unable to get utilities (heat,electricity)? No            11/24/2024   Fall Risk   Fallen 2 or more times in the past year? No    Trouble with walking or balance? No        Patient-reported          11/24/2024   Activities of Daily Living- Home Safety   Needs help with the following daily activites None of the above   Safety concerns in the home None of the above            11/24/2024   Dental   Dentist two times every year? Yes            11/24/2024   Hearing Screening   Hearing concerns? (!) I NEED TO ASK PEOPLE TO SPEAK UP OR REPEAT THEMSELVES.    (!) IT'S HARD TO FOLLOW A CONVERSATION IN A NOISY RESTAURANT OR CROWDED ROOM.       Multiple values from one day are sorted in reverse-chronological order         11/24/2024   Driving Risk Screening   Patient/family members have concerns about driving No            11/24/2024   General Alertness/Fatigue Screening   Have you been more tired than usual lately? No            11/24/2024   Urinary Incontinence Screening   Bothered by leaking urine in past 6 months No            11/24/2024   TB Screening   Were you born outside of the [place]? No             Today's PHQ-2 Score:       11/24/2024     6:26 PM   PHQ-2 ( 1999 [employer])   Q1: Little interest or pleasure in doing things 0    Q2: Feeling down, depressed or hopeless 0    PHQ-2 Score 0    Q1: Little interest or pleasure in doing things Not at all   Q2: Feeling down, depressed or hopeless Not at all   PHQ-2 Score 0       Patient-reported           11/24/2024   Substance Use   Alcohol more than 3/day or more than 7/wk No   Do you have a current opioid prescription? No   How severe/bad is pain from 1 to 10? 0/10 (No Pain)   Do you use any other substances recreationally? No        Social History     Tobacco Use    Smoking status: Never     Passive exposure: Never    Smokeless tobacco: Never   Vaping Use    Vaping status: Never Used   Substance Use Topics    Alcohol use: Yes     Comment: occasional    Drug use: No           11/24/2024   AAA Screening   Family history of Abdominal Aortic Aneurysm (AAA)? No      ASCVD Risk   The 10-year ASCVD risk score (Yash BLANKENSHIP, et al., 2019) is: 18.5%    Values used to calculate the score:      Age: 74 years      Sex: Male      Is Non- : No      Diabetic: No      Tobacco smoker: No      Systolic Blood Pressure: 115 mmHg      Is BP treated: Yes      HDL Cholesterol: 47 mg/dL      Total Cholesterol: 156 mg/dL            Reviewed and updated as needed this visit by Provider                    Lab work is in process  Labs reviewed in EPIC  BP Readings from Last 3 Encounters:   11/25/24 115/72   06/17/24 116/70   06/09/24 (!) 140/79    Wt Readings from Last 3 Encounters:   11/25/24 101.1 kg (222 lb 14.4 oz)   06/17/24 99.2 kg (218 lb 12.8 oz)   06/09/24 98.4 kg (217 lb)                  Patient Active Problem List   Diagnosis    CARDIOVASCULAR SCREENING; LDL GOAL LESS THAN 160    Hypertension goal BP (blood pressure) < 140/90    Insomnia, transient    CKD (chronic kidney disease) stage 3, GFR 30-59 ml/min (H)    Atrial fibrillation with RVR (H)     Hyperlipidemia LDL goal <100    Bradycardia    Central sleep apnea syndrome    History of total hip replacement    Obstructive sleep apnea syndrome in adult    Osteoarthritis of hip    Primary osteoarthritis of right hip    Pain in left wrist    REM sleep behavior disorder    Sensorineural hearing loss of combined sites    Sleep apnea, unspecified    Sleep apnea    Tinnitus    Carpal tunnel syndrome of left wrist    HTN (hypertension)     Past Surgical History:   Procedure Laterality Date    COLONOSCOPY  6/10/2013    Procedure: COLONOSCOPY;  Colonoscopy;  Surgeon: Wes Pablo MD;  Location: [facility]    COLONOSCOPY N/A 5/20/2019    Procedure: COLONOSCOPY;  Surgeon: Jesus Harris MD;  Location: [facility]    HC REMOVAL OF TONSILS,<11 Y/O  5 years old    Tonsils <14y.o.    HERNIA REPAIR, INGUINAL RT/LT      HERNIORRHAPHY INGUINAL Left 1/10/2024    Procedure: HERNIORRHAPHY, LEFT INGUINAL, OPEN;  Surgeon: Lazaro Gordon MD;  Location: [facility]    JOINT REPLACEMENT, HIP RT/LT  12/19/11    Left total hip arthroplasty. [facility]    JOINT REPLACEMENT, HIP RT/LT  12/18/2017    Right tota hip arthroplasty. [facility]    RELEASE CARPAL TUNNEL Left 10/18/2017    Procedure: RELEASE CARPAL TUNNEL;  Left Carpal Tunnel Release;  Surgeon: Norris Townsend MD;  Location: [facility]    ZZHC COLONOSCOPY THRU STOMA W BIOPSY/CAUTERY TUMOR/POLYP/LESION  2003    hyperplastic polyp repeat in 2008.       Social History     Tobacco Use    Smoking status: Never     Passive exposure: Never    Smokeless tobacco: Never   Substance Use Topics    Alcohol use: Yes     Comment: occasional     Family History   Problem Relation Age of Onset    Heart Disease Mother         Tachycardia    Osteoporosis Mother     Diabetes Father     Cancer Father         skin, lymph and colon CA    Colon Cancer Father     Arthritis Brother     Anesthesia Reaction No family hx of          Current Outpatient Medications   Medication Sig Dispense  Refill    aspirin (ASA) 81 MG EC tablet Take 81 mg by mouth      atorvastatin (LIPITOR) 20 MG tablet Take 1 tablet (20 mg) by mouth daily 90 tablet 3    bisacodyl (DULCOLAX) 5 MG EC tablet Take 2 tablets at 3 pm the day before your procedure. If your procedure is before 11 am, take 2 additional tablets at 11 pm. If your procedure is after 11 am, take 2 additional tablets at 6 am. For additional instructions refer to your colonoscopy prep instructions. 4 tablet 0    lisinopril (ZESTRIL) 5 MG tablet Take 1 tablet (5 mg) by mouth daily 90 tablet 3    melatonin 1 MG/ML LIQD liquid Take 5 mg by mouth      metoprolol succinate ER (TOPROL XL) 50 MG 24 hr tablet Take 1 tablet (50 mg) by mouth daily 90 tablet 3    polyethylene glycol (GOLYTELY) 236 g suspension The night before the exam at 6 pm drink an 8-ounce glass every 15 minutes until the jug is half empty. If you arrive before 11 AM: Drink the other half of the Golytely jug at 11 PM night before procedure. If you arrive after 11 AM: Drink the other half of the Golytely jug at 6 AM day of procedure. For additional instructions refer to your colonoscopy prep instructions. 4000 mL 0    Probiotic Product (PROBIOTIC DAILY PO)       acetaminophen (TYLENOL) 500 MG tablet Take 500-1,000 mg by mouth every 6 hours as needed for mild pain       No Known Allergies  Recent Labs   Lab Test 12/13/23  1321 12/16/22  0826 10/25/21  1810 10/25/21  1810 03/03/20  0816 02/18/20  0000 12/08/17  0941 03/16/17  0000   LDL 87 67  --  64  --   --    < >  --    HDL 47 54  --  45  --  43   < >  --    TRIG 112 78  --  148  --   --    < >  --    ALT  --   --   --   --  26  --   --  41   CR 1.47* 1.50*   < > 1.26* 1.38* 1.4*   < > 1.3   GFRESTIMATED 51* 50*   < > 58* 52* 52*   < > 56   GFRESTBLACK  --   --   --   --  60* 60   < >  --    POTASSIUM 4.5 4.5   < > 4.3 4.4  --    < > 5.1*   TSH  --   --   --   --  1.85 3.5  --   --     < > = values in this interval not displayed.      Current providers  "sharing in care for this patient include:  Patient Care Team:  Ryan Perera MD as PCP - General  Ryan Perera MD as Assigned PCP  Jeremiah Orellana MD as MD (Critical Care)  Lazaro Gordon MD as Assigned Heart and Vascular Provider  Jeremiah Orellana MD as Assigned Pulmonology Provider    The following health maintenance items are reviewed in Epic and correct as of today:  Health Maintenance   Topic Date Due    RSV VACCINE (1 - Risk 60-74 years 1-dose series) Never done    COLORECTAL CANCER SCREENING  05/20/2024    MEDICARE ANNUAL WELLNESS VISIT  06/01/2024    ANNUAL REVIEW OF HM ORDERS  06/01/2024    BMP  12/13/2024    LIPID  12/13/2024    MICROALBUMIN  12/13/2024    HEMOGLOBIN  12/13/2024    FALL RISK ASSESSMENT  11/25/2025    GLUCOSE  12/13/2026    ADVANCE CARE PLANNING  06/01/2028    DTAP/TDAP/TD IMMUNIZATION (3 - Td or Tdap) 10/19/2030    HEPATITIS C SCREENING  Completed    PHQ-2 (once per calendar year)  Completed    INFLUENZA VACCINE  Completed    Pneumococcal Vaccine: 65+ Years  Completed    URINALYSIS  Completed    ZOSTER IMMUNIZATION  Completed    COVID-19 Vaccine  Completed    HPV IMMUNIZATION  Aged Out    MENINGITIS IMMUNIZATION  Aged Out    RSV MONOCLONAL ANTIBODY  Aged Out         Review of Systems  CONSTITUTIONAL: NEGATIVE for fever, chills, change in weight  ENT/MOUTH: NEGATIVE for ear, mouth and throat problems  RESP: NEGATIVE for significant cough or SOB  CV: NEGATIVE for chest pain, palpitations or peripheral edema  ROS otherwise negative     Objective    Exam  /72 (BP Location: Left arm, Patient Position: Sitting, Cuff Size: Adult Regular)   Pulse 56   Temp 97.2  F (36.2  C) (Temporal)   Resp 14   Ht 1.815 m (5' 11.46\")   Wt 101.1 kg (222 lb 14.4 oz)   SpO2 97%   BMI 30.69 kg/m     Estimated body mass index is 30.69 kg/m  as calculated from the following:    Height as of this encounter: 1.815 m (5' 11.46\").    Weight as of this encounter: 101.1 kg (222 lb 14.4 " oz).    Physical Exam  GENERAL: alert and no distress  EYES: Eyes grossly normal to inspection, PERRL and conjunctivae and sclerae normal  HENT: ear canals and TM's normal, nose and mouth without ulcers or lesions  NECK: no adenopathy, no asymmetry, masses, or scars  RESP: lungs clear to auscultation - no rales, rhonchi or wheezes  CV: regular rate and rhythm, normal S1 S2, no S3 or S4, no murmur, click or rub, no peripheral edema  ABDOMEN: soft, nontender, no hepatosplenomegaly, no masses and bowel sounds normal  MS: no gross musculoskeletal defects noted, no edema  NEURO: Normal strength and tone, mentation intact and speech normal  PSYCH: mentation appears normal, affect normal/bright  LYMPH: no cervical, supraclavicular, axillary, or inguinal adenopathy        11/25/2024   Mini Cog   Clock Draw Score 2 Normal   3 Item Recall 3 objects recalled   Mini Cog Total Score 5                 Signed Electronically by: Ryan Perera MD

## 2024-11-25 NOTE — PATIENT INSTRUCTIONS
Patient Education   Preventive Care Advice   This is general advice given by our system to help you stay healthy. However, your care team may have specific advice just for you. Please talk to your care team about your preventive care needs.  Nutrition  Eat 5 or more servings of fruits and vegetables each day.  Try wheat bread, brown rice and whole grain pasta (instead of white bread, rice, and pasta).  Get enough calcium and vitamin D. Check the label on foods and aim for 100% of the RDA (recommended daily allowance).  Lifestyle  Exercise at least 150 minutes each week  (30 minutes a day, 5 days a week).  Do muscle strengthening activities 2 days a week. These help control your weight and prevent disease.  No smoking.  Wear sunscreen to prevent skin cancer.  Have a dental exam and cleaning every 6 months.  Yearly exams  See your health care team every year to talk about:  Any changes in your health.  Any medicines your care team has prescribed.  Preventive care, family planning, and ways to prevent chronic diseases.  Shots (vaccines)   HPV shots (up to age 26), if you've never had them before.  Hepatitis B shots (up to age 59), if you've never had them before.  COVID-19 shot: Get this shot when it's due.  Flu shot: Get a flu shot every year.  Tetanus shot: Get a tetanus shot every 10 years.  Pneumococcal, hepatitis A, and RSV shots: Ask your care team if you need these based on your risk.  Shingles shot (for age 50 and up)  General health tests  Diabetes screening:  Starting at age 35, Get screened for diabetes at least every 3 years.  If you are younger than age 35, ask your care team if you should be screened for diabetes.  Cholesterol test: At age 39, start having a cholesterol test every 5 years, or more often if advised.  Bone density scan (DEXA): At age 50, ask your care team if you should have this scan for osteoporosis (brittle bones).  Hepatitis C: Get tested at least once in your life.  STIs (sexually  transmitted infections)  Before age 24: Ask your care team if you should be screened for STIs.  After age 24: Get screened for STIs if you're at risk. You are at risk for STIs (including HIV) if:  You are sexually active with more than one person.  You don't use condoms every time.  You or a partner was diagnosed with a sexually transmitted infection.  If you are at risk for HIV, ask about PrEP medicine to prevent HIV.  Get tested for HIV at least once in your life, whether you are at risk for HIV or not.  Cancer screening tests  Cervical cancer screening: If you have a cervix, begin getting regular cervical cancer screening tests starting at age 21.  Breast cancer scan (mammogram): If you've ever had breasts, begin having regular mammograms starting at age 40. This is a scan to check for breast cancer.  Colon cancer screening: It is important to start screening for colon cancer at age 45.  Have a colonoscopy test every 10 years (or more often if you're at risk) Or, ask your provider about stool tests like a FIT test every year or Cologuard test every 3 years.  To learn more about your testing options, visit:   .  For help making a decision, visit:   https://bit.ly/gq71855.  Prostate cancer screening test: If you have a prostate, ask your care team if a prostate cancer screening test (PSA) at age 55 is right for you.  Lung cancer screening: If you are a current or former smoker ages 50 to 80, ask your care team if ongoing lung cancer screenings are right for you.  For informational purposes only. Not to replace the advice of your health care provider. Copyright   2023 [facility]. All rights reserved. Clinically reviewed by the [facility]. [employer] [id number] - REV 01/24.  Nutrition for Older Adults: Care Instructions  Overview     Good nutrition is important at any age. But it is especially important for older adults. Eating healthy foods helps keep your body strong. And it can  help lower your risk for disease.  As you get older, your body needs more of certain nutrients. These include vitamin B12, calcium, and vitamin D. But it may be harder for you to get these and other important nutrients. This could be for many reasons. You may not feel as hungry as you used to. Or you could have problems with your teeth or mouth that make it hard to chew. Or you may not enjoy planning and preparing meals, especially if you live alone.  Talk with your doctor if you want help getting the most nutrition from what you eat. They may have you work with a dietitian to help you plan meals.  Follow-up care is a key part of your treatment and safety. Be sure to make and go to all appointments, and call your doctor if you are having problems. It's also a good idea to know your test results and keep a list of the medicines you take.  How can you care for yourself at home?  To stay healthy  Eat a variety of foods. The more you vary the foods you eat, the more vitamins, minerals, and other nutrients you get.  Ask your doctor if you should take a multivitamin. Choose one with about 100% of the daily value (DV) for vitamins and minerals. Do not take more than 100% of the daily value for any vitamin or mineral unless your doctor tells you to. Talk with your doctor if you are not sure which multivitamin is right for you.  Try to eat lots of fruits and vegetables. Fresh or frozen vegetables and fruits are healthy choices. Choose canned vegetables that have no salt added and fruits that are canned in their own juice or light syrup.  Include foods that are high in vitamin B12 in your diet. Good choices are fortified breakfast cereal, nonfat or low-fat milk and other dairy products, meat, poultry, fish, and eggs.  Get enough calcium and vitamin D. Good choices include nonfat or low-fat milk, cheese, and yogurt. Other good options are tofu, orange juice with added calcium, and some leafy green vegetables, such as mary  greens and kale. If you don't use milk products, talk to your doctor about calcium and vitamin D supplements.  Try to eat protein foods every day. Good choices include lean meat, fish, poultry, eggs, and cheese. Other good options are cooked beans, peanut butter, and nuts and seeds.  Choose whole grains for half of the grains you eat. Look for 100% whole wheat bread, whole-grain cereals, brown rice, and other whole grains.  If you have constipation  Eat high-fiber foods every day if you can. These include fruits, vegetables, cooked dried beans, and whole grains.  Drink plenty of fluids. If you have kidney, heart, or liver disease and have to limit fluids, talk with your doctor before you increase the amount of fluids you drink.  Ask your doctor if stool softeners may help keep your bowels regular.  If you have mouth problems that make chewing hard  Pick canned or cooked fruits and vegetables. These are often softer.  Chop or shred meat, poultry, and fish. Add sauce or gravy to the meat to help keep it moist.  Pick other protein foods that are soft. These include cheese, peanut butter, cooked beans, cottage cheese, and eggs.  If you have trouble shopping for yourself  Ask a local food store to deliver groceries to your home.  Contact your local area agency on aging and ask about resources that can help.  Ask a family member or neighbor to help you.  If you have trouble preparing meals  Try easier cooking methods such as using a slow cooker or microwave oven.  Let the grocery store do some of the work for you. Look for precut, washed, and ready-to-eat foods.  Take part in group meal programs. You can find these through senior citizen programs.  Have meals brought to your home. Your community may offer programs that deliver meals, such as Meals on Wheels. Or you could use an online meal delivery service.  If you are able, take a cooking class.  If your appetite is poor  Try to eat meals on a regular schedule. It may  "help to eat smaller meals more often throughout the day.  If you can, eat some meals with other people. You could ask family or friends to eat with you. Or you could take part in group meal programs offered in your community.  Ask your doctor if your medicines could cause appetite or taste problems. If so, ask about changing medicines.  Add spices and herbs to increase the flavor of food.  If you think you are depressed, ask your doctor for help. Depression can affect your appetite. And it can make it hard to do everyday activities like grocery shopping and making meals. Treatment can help.  When should you call for help?  Watch closely for changes in your health, and be sure to contact your doctor if you have any problems.  Where can you learn more?  Go to https://www.[employer].net/patiented  Enter L643 in the search box to learn more about \"Nutrition for Older Adults: Care Instructions.\"  Current as of: September 25, 2023  Content Version: 14.2 2024 [facility].   Care instructions adapted under license by your healthcare professional. If you have questions about a medical condition or this instruction, always ask your healthcare professional. [employer] disclaims any warranty or liability for your use of this information.    Hearing Loss: Care Instructions  Overview     Hearing loss is a sudden or slow decrease in how well you hear. It can range from slight to profound. Permanent hearing loss can occur with aging. It also can happen when you are exposed long-term to loud noise. Examples include listening to loud music, riding motorcycles, or being around other loud machines.  Hearing loss can affect your work and home life. It can make you feel lonely or depressed. You may feel that you have lost your independence. But hearing aids and other devices can help you hear better and feel connected to others.  Follow-up care is a key part of your treatment and safety. Be sure to make and go " to all appointments, and call your doctor if you are having problems. It's also a good idea to know your test results and keep a list of the medicines you take.  How can you care for yourself at home?  Avoid loud noises whenever possible. This helps keep your hearing from getting worse.  Always wear hearing protection around loud noises.  Wear a hearing aid as directed.  A professional can help you pick a hearing aid that will work best for you.  You can also get hearing aids over the counter for mild to moderate hearing loss.  Have hearing tests as your doctor suggests. They can show whether your hearing has changed. Your hearing aid may need to be adjusted.  Use other devices as needed. These may include:  Telephone amplifiers and hearing aids that can connect to a television, stereo, radio, or microphone.  Devices that use lights or vibrations. These alert you to the doorbell, a ringing telephone, or a baby monitor.  Television closed-captioning. This shows the words at the bottom of the screen. Most new TVs can do this.  TTY (text telephone). This lets you type messages back and forth on the telephone instead of talking or listening. These devices are also called TDD. When messages are typed on the keyboard, they are sent over the phone line to a receiving TTY. The message is shown on a monitor.  Use text messaging, social media, and email if it is hard for you to communicate by telephone.  Try to learn a listening technique called speechreading. It is not lipreading. You pay attention to people's gestures, expressions, posture, and tone of voice. These clues can help you understand what a person is saying. Face the person you are talking to, and have them face you. Make sure the lighting is good. You need to see the other person's face clearly.  Think about counseling if you need help to adjust to your hearing loss.  When should you call for help?  Watch closely for changes in your health, and be sure to  "contact your doctor if:    You think your hearing is getting worse.     You have new symptoms, such as dizziness or nausea.   Where can you learn more?  Go to https://www.[employer].net/patiented  Enter R798 in the search box to learn more about \"Hearing Loss: Care Instructions.\"  Current as of: September 27, 2023  Content Version: 14.2 2024 [facility].   Care instructions adapted under license by your healthcare professional. If you have questions about a medical condition or this instruction, always ask your healthcare professional. [employer] disclaims any warranty or liability for your use of this information.       "

## 2024-11-27 ENCOUNTER — ANESTHESIA (OUTPATIENT)
Dept: GASTROENTEROLOGY | Facility: CLINIC | Age: 74
End: 2024-11-27
Payer: MEDICARE

## 2024-11-27 ENCOUNTER — ANESTHESIA EVENT (OUTPATIENT)
Dept: GASTROENTEROLOGY | Facility: CLINIC | Age: 74
End: 2024-11-27
Payer: MEDICARE

## 2024-11-27 ENCOUNTER — HOSPITAL ENCOUNTER (OUTPATIENT)
Facility: CLINIC | Age: 74
Discharge: HOME | End: 2024-11-27
Attending: INTERNAL MEDICINE | Admitting: INTERNAL MEDICINE
Payer: MEDICARE

## 2024-11-27 VITALS
OXYGEN SATURATION: 94 % | TEMPERATURE: 97.7 F | SYSTOLIC BLOOD PRESSURE: 134 MMHG | DIASTOLIC BLOOD PRESSURE: 80 MMHG | RESPIRATION RATE: 16 BRPM | WEIGHT: 222.9 LBS | BODY MASS INDEX: 30.69 KG/M2 | HEART RATE: 61 BPM

## 2024-11-27 LAB — COLONOSCOPY: NORMAL

## 2024-11-27 PROCEDURE — 88305 TISSUE EXAM BY PATHOLOGIST: CPT | Mod: 26 | Performed by: PATHOLOGY

## 2024-11-27 PROCEDURE — 258N000003 HC RX IP 258 OP 636: Performed by: NURSE ANESTHETIST, CERTIFIED REGISTERED

## 2024-11-27 PROCEDURE — 250N000011 HC RX IP 250 OP 636: Performed by: NURSE ANESTHETIST, CERTIFIED REGISTERED

## 2024-11-27 PROCEDURE — 250N000009 HC RX 250: Performed by: NURSE ANESTHETIST, CERTIFIED REGISTERED

## 2024-11-27 PROCEDURE — 88305 TISSUE EXAM BY PATHOLOGIST: CPT | Mod: TC | Performed by: INTERNAL MEDICINE

## 2024-11-27 PROCEDURE — 45380 COLONOSCOPY AND BIOPSY: CPT | Performed by: INTERNAL MEDICINE

## 2024-11-27 PROCEDURE — 370N000017 HC ANESTHESIA TECHNICAL FEE, PER MIN: Performed by: INTERNAL MEDICINE

## 2024-11-27 RX ORDER — NALOXONE HYDROCHLORIDE 0.4 MG/ML
0.1 INJECTION, SOLUTION INTRAMUSCULAR; INTRAVENOUS; SUBCUTANEOUS
Status: CANCELLED | OUTPATIENT
Start: 2024-11-27

## 2024-11-27 RX ORDER — LIDOCAINE 40 MG/G
CREAM TOPICAL
Status: DISCONTINUED | OUTPATIENT
Start: 2024-11-27 | End: 2024-11-27 | Stop reason: HOSPADM

## 2024-11-27 RX ORDER — ONDANSETRON 4 MG/1
4 TABLET, ORALLY DISINTEGRATING ORAL EVERY 30 MIN PRN
Status: CANCELLED | OUTPATIENT
Start: 2024-11-27

## 2024-11-27 RX ORDER — LIDOCAINE HYDROCHLORIDE 20 MG/ML
INJECTION, SOLUTION INFILTRATION; PERINEURAL PRN
Status: DISCONTINUED | OUTPATIENT
Start: 2024-11-27 | End: 2024-11-27

## 2024-11-27 RX ORDER — PROPOFOL 10 MG/ML
INJECTION, EMULSION INTRAVENOUS CONTINUOUS PRN
Status: DISCONTINUED | OUTPATIENT
Start: 2024-11-27 | End: 2024-11-27

## 2024-11-27 RX ORDER — PROPOFOL 10 MG/ML
INJECTION, EMULSION INTRAVENOUS PRN
Status: DISCONTINUED | OUTPATIENT
Start: 2024-11-27 | End: 2024-11-27

## 2024-11-27 RX ORDER — DEXAMETHASONE SODIUM PHOSPHATE 10 MG/ML
4 INJECTION, SOLUTION INTRAMUSCULAR; INTRAVENOUS
Status: CANCELLED | OUTPATIENT
Start: 2024-11-27

## 2024-11-27 RX ORDER — ONDANSETRON 2 MG/ML
4 INJECTION INTRAMUSCULAR; INTRAVENOUS EVERY 30 MIN PRN
Status: CANCELLED | OUTPATIENT
Start: 2024-11-27

## 2024-11-27 RX ADMIN — PHENYLEPHRINE HYDROCHLORIDE 100 MCG: 10 INJECTION INTRAVENOUS at 09:03

## 2024-11-27 RX ADMIN — PROPOFOL 200 MCG/KG/MIN: 10 INJECTION, EMULSION INTRAVENOUS at 08:53

## 2024-11-27 RX ADMIN — PHENYLEPHRINE HYDROCHLORIDE 100 MCG: 10 INJECTION INTRAVENOUS at 09:09

## 2024-11-27 RX ADMIN — PROPOFOL 150 MG: 10 INJECTION, EMULSION INTRAVENOUS at 08:53

## 2024-11-27 RX ADMIN — PHENYLEPHRINE HYDROCHLORIDE 100 MCG: 10 INJECTION INTRAVENOUS at 09:13

## 2024-11-27 RX ADMIN — PHENYLEPHRINE HYDROCHLORIDE 100 MCG: 10 INJECTION INTRAVENOUS at 09:05

## 2024-11-27 RX ADMIN — LIDOCAINE HYDROCHLORIDE 50 MG: 20 INJECTION, SOLUTION INFILTRATION; PERINEURAL at 08:53

## 2024-11-27 RX ADMIN — PHENYLEPHRINE HYDROCHLORIDE 100 MCG: 10 INJECTION INTRAVENOUS at 09:02

## 2024-11-27 RX ADMIN — PHENYLEPHRINE HYDROCHLORIDE 100 MCG: 10 INJECTION INTRAVENOUS at 09:06

## 2024-11-27 ASSESSMENT — ACTIVITIES OF DAILY LIVING (ADL)
ADLS_ACUITY_SCORE: 41

## 2024-11-27 NOTE — ANESTHESIA PREPROCEDURE EVALUATION
Anesthesia Pre-Procedure Evaluation    Patient: Dav Seay   MRN: [id number] : 1950        Procedure : Procedure(s):  Colonoscopy          Past Medical History:   Diagnosis Date    Arthritis     Hypertension     Traumatic amputation of other finger(s) (complete) (partial), without mention of complication     Amputated RIght 2nd finger      Past Surgical History:   Procedure Laterality Date    COLONOSCOPY  6/10/2013    Procedure: COLONOSCOPY;  Colonoscopy;  Surgeon: Wes Pablo MD;  Location: [facility]    COLONOSCOPY N/A 2019    Procedure: COLONOSCOPY;  Surgeon: Jesus Harris MD;  Location: [facility]    HC REMOVAL OF TONSILS,<11 Y/O  5 years old    Tonsils <14y.o.    HERNIA REPAIR, INGUINAL RT/LT      HERNIORRHAPHY INGUINAL Left 1/10/2024    Procedure: HERNIORRHAPHY, LEFT INGUINAL, OPEN;  Surgeon: Lazaro Gordon MD;  Location: [facility]    JOINT REPLACEMENT, HIP RT/LT  13    Left total hip arthroplasty. [facility]    JOINT REPLACEMENT, HIP RT/LT  2017    Right tota hip arthroplasty. [facility]    RELEASE CARPAL TUNNEL Left 10/18/2017    Procedure: RELEASE CARPAL TUNNEL;  Left Carpal Tunnel Release;  Surgeon: Norris Townsend MD;  Location: [facility]    ZZHC COLONOSCOPY THRU STOMA W BIOPSY/CAUTERY TUMOR/POLYP/LESION  2003    hyperplastic polyp repeat in .      No Known Allergies   Social History     Tobacco Use    Smoking status: Never     Passive exposure: Never    Smokeless tobacco: Never   Substance Use Topics    Alcohol use: Yes     Comment: occasional      Wt Readings from Last 1 Encounters:   24 101.1 kg (222 lb 14.4 oz)        Anesthesia Evaluation   Pt has had prior anesthetic.         ROS/MED HX  ENT/Pulmonary:     (+) sleep apnea,                                       Neurologic:  - neg neurologic ROS     Cardiovascular:     (+)  hypertension- -   -  - -                                      METS/Exercise Tolerance:     Hematologic:  -  "neg hematologic  ROS     Musculoskeletal:  - neg musculoskeletal ROS     GI/Hepatic:     (+)        bowel prep,            Renal/Genitourinary:       Endo:     (+)               Obesity,       Psychiatric/Substance Use:  - neg psychiatric ROS     Infectious Disease:  - neg infectious disease ROS     Malignancy:  - neg malignancy ROS     Other:  - neg other ROS          Physical Exam    Airway  airway exam normal      Mallampati: II   TM distance: > 3 FB   Neck ROM: full   Mouth opening: > 3 cm    Respiratory Devices and Support         Dental           Cardiovascular   cardiovascular exam normal       Rhythm and rate: regular and normal     Pulmonary   pulmonary exam normal        breath sounds clear to auscultation           OUTSIDE LABS:  CBC:   Lab Results   Component Value Date    WBC 7.9 03/03/2020    WBC 6.5 12/08/2017    HGB 14.4 11/25/2024    HGB 15.6 12/13/2023    HCT 49.7 03/03/2020    HCT 45.9 12/08/2017     03/03/2020     12/08/2017     BMP:   Lab Results   Component Value Date     11/25/2024     12/13/2023    POTASSIUM 4.0 11/25/2024    POTASSIUM 4.5 12/13/2023    CHLORIDE 103 11/25/2024    CHLORIDE 103 12/13/2023    CO2 24 11/25/2024    CO2 27 12/13/2023    BUN 20.0 11/25/2024    BUN 20.6 12/13/2023    CR 1.40 (H) 11/25/2024    CR 1.47 (H) 12/13/2023     (H) 11/25/2024     (H) 12/13/2023     COAGS:   Lab Results   Component Value Date    INR 0.97 03/03/2020     POC: No results found for: \"BGM\", \"HCG\", \"HCGS\"  HEPATIC:   Lab Results   Component Value Date    ALBUMIN 4.2 03/03/2020    PROTTOTAL 7.2 03/03/2020    ALT 26 03/03/2020    AST 20 03/03/2020    ALKPHOS 65 03/03/2020    BILITOTAL 1.0 03/03/2020     OTHER:   Lab Results   Component Value Date    PH 6.0 11/21/2002    ZELALEM 9.0 11/25/2024    TSH 1.85 03/03/2020       Anesthesia Plan    ASA Status:  2    NPO Status:  NPO Appropriate    Anesthesia Type: General.   Induction: Intravenous, Propofol.   Maintenance: " "TIVA.        Consents    Anesthesia Plan(s) and associated risks, benefits, and realistic alternatives discussed. Questions answered and patient/representative(s) expressed understanding.     - Discussed: Risks, Benefits and Alternatives for BOTH SEDATION and the PROCEDURE were discussed     - Discussed with:  Patient       - Patient is DNR/DNI Status: No     Use of blood products discussed: No .     Postoperative Care            Comments:               BUCK Nation CRNA    I have reviewed the pertinent notes and labs in the chart from the past 30 days and (re)examined the patient.  Any updates or changes from those notes are reflected in this note.             # Drug Induced Platelet Defect: home medication list includes an antiplatelet medication   # Hypertension: Noted on problem list           # Obesity: Estimated body mass index is 30.69 kg/m  as calculated from the following:    Height as of 11/25/24: 1.815 m (5' 11.46\").    Weight as of this encounter: 101.1 kg (222 lb 14.4 oz).             "

## 2024-11-27 NOTE — ANESTHESIA CARE TRANSFER NOTE
Patient: Dav Seay    Procedure: Procedure(s):  COLONOSCOPY, WITH POLYPECTOMY       Diagnosis: Special screening for malignant neoplasms, colon [Z12.11]  Diagnosis Additional Information: No value filed.    Anesthesia Type:   No value filed.     Note:    Oropharynx: oropharynx clear of all foreign objects  Level of Consciousness: drowsy  Oxygen Supplementation: nasal cannula  Level of Supplemental Oxygen (L/min / FiO2): 2  Independent Airway: airway patency satisfactory and stable  Dentition: dentition unchanged  Vital Signs Stable: post-procedure vital signs reviewed and stable  Report to RN Given: handoff report given  Patient transferred to: Phase II    Handoff Report: Identifed the Patient, Identified the Reponsible Provider, Reviewed the pertinent medical history, Discussed the surgical course, Reviewed Intra-OP anesthesia mangement and issues during anesthesia, Set expectations for post-procedure period and Allowed opportunity for questions and acknowledgement of understanding      Vitals:  Vitals Value Taken Time   /46 11/27/24 0915   Temp     Pulse 54 11/27/24 0915   Resp     SpO2 93 % 11/27/24 0920   Vitals shown include unfiled device data.    Electronically Signed By: BUCK Nation CRNA  November 27, 2024  9:20 AM

## 2024-11-27 NOTE — LETTER
December 2, 2024      Dav Seay  [address] [address]        Dear ,    We are writing to inform you of your test results.    Non-concerning polyps.  A repeat exam in 5 yrs is optional if you wish and future health permits.     Resulted Orders   Surgical Pathology Exam   Result Value Ref Range    Case Report       Surgical Pathology Report                         Case: [id number]                                  Authorizing Provider:  Mayo Topete MD        Collected:           11/27/2024 09:06 AM          Ordering Location:     [facility]          Received:            11/27/2024 09:28 AM                                 [facility]                                                          Pathologist:           Sola Su MD                                                                           Specimen:    Rectum, rectal polyps                                                                      Final Diagnosis       Large intestine, rectum, polypectomy:  -2 hyperplastic polyps and 1 fragment of nonneoplastic colonic mucosa with lymphoid aggregate        Clinical Information       Procedure:  COLONOSCOPY, WITH POLYPECTOMY  Pre-op Diagnosis: Special screening for malignant neoplasms, colon [Z12.11]  Post-op Diagnosis: Z12.11 - Special screening for malignant neoplasms, colon [ICD-10-CM]    - Three 2 to 3 mm polyps in the rectum, removed with a      cold biopsy forceps. Resected and retrieved.       Gross Description       A(1). Rectum, rectal polyps:  The specimen is received in formalin, labeled with the patient's name, medical record number and other identifying information and designated  Rectal polyps . It consists of 3 tan soft tissue fragments ranging from 0.2-0.3 cm. Entirely submitted in one cassette.   JASPREET Aguilar(ASCP)CM 11/27/2024 2:14 PM       Microscopic  Description       A formal microscopic examination has been performed      Performing Labs       The technical component of this testing was completed at [facility].    Stain controls for all stains resulted within this report have been reviewed and show appropriate reactivity.       Case Images         If you have any questions or concerns, please call the clinic at the number listed above.       Sincerely,      Mayo Topete MD

## 2024-11-27 NOTE — ANESTHESIA POSTPROCEDURE EVALUATION
Patient: Dav Seay    Procedure: Procedure(s):  COLONOSCOPY, WITH POLYPECTOMY       Anesthesia Type:  General    Note:  Disposition: Outpatient   Postop Pain Control: Uneventful            Sign Out: Well controlled pain   PONV: No   Neuro/Psych: Uneventful            Sign Out: Acceptable/Baseline neuro status   Airway/Respiratory: Uneventful            Sign Out: Acceptable/Baseline resp. status   CV/Hemodynamics: Uneventful            Sign Out: Acceptable CV status; No obvious hypovolemia; No obvious fluid overload   Other NRE: NONE   DID A NON-ROUTINE EVENT OCCUR? No           Last vitals:  Vitals Value Taken Time   /79 11/27/24 1032   Temp     Pulse 91 11/27/24 1032   Resp     SpO2 98 % 11/27/24 1033   Vitals shown include unfiled device data.    Electronically Signed By: BUCK Nation CRNA  November 27, 2024  11:10 AM   Pt cleared by consults. Will obtain medication from Pharmacy. Educational Information and follow up was provided.

## 2024-11-27 NOTE — DISCHARGE INSTRUCTIONS
[facility]    Home Care Following Endoscopy          Activity:  You have just undergone an endoscopic procedure usually performed with conscious sedation.  Do not work or operate machinery (including a car) for at least 12 hours.    I encourage you to walk and attempt to pass this air as soon as possible.    Diet:  Return to the diet you were on before your procedure but eat lightly for the first 12-24 hours.  Drink plenty of water.  Resume any regular medications unless otherwise advised by your physician.  Please begin any new medication prescribed as a result of your procedure as directed by your physician.   If you had any biopsy or polyp removed please refrain from aspirin or aspirin products for 2 days.  If on Coumadin please restart as instructed by your physician.   Pain:  You may take Tylenol as needed for pain.  Expected Recovery:  You can expect some mild abdominal fullness and/or discomfort due to the air used to inflate your intestinal tract. It is also normal to have a mild sore throat after upper endoscopy.    Call Your Physician if You Have:  After Colonoscopy:  Worsening persisting abdominal pain which is worse with activity.  Fevers (>101 degrees F), chills or shakes.  Passage of continued blood with bowel movements.     Any questions or concerns about your recovery, please call [phone] or after hours [facility] ([phone]) Nurse Advice Line.    Follow-up Care:  You did have polyps/biopsy tissue sample(s) removed.  The polyps/biopsy tissue sample(s) will be sent to pathology.    You should receive letter in your My Chart from Dr. Topete with your results within 1-2 weeks. If you do not participate in My Chart a physical letter will come in the mail in 2-3 weeks.  Please call if you have not received a notification of your results.  If asked to return to clinic please make an appointment 1 week after your procedure.  Call [phone].

## 2024-11-27 NOTE — H&P
[facility] Anesthesia Pre-op History and Physical    Dav Seay MRN# [id number]   Age: 74 year old YOB: 1950      Date of Surgery: 11/27/2024 Location [facility]      Date of Exam 11/27/2024 Facility (In hospital)       Home clinic: [facility]  Primary care provider: Ryan Perera         Chief Complaint and/or Reason for Procedure:   No chief complaint on file.  Colonoscopy  2019 exam, family hx father age 60s       Active problem list:     Patient Active Problem List    Diagnosis Date Noted    Bradycardia 03/27/2024     Priority: Medium     Mar 21, 2016 Entered By: HELLEN REESE Comment: Likely  Beta Blocker to discuss with Dr. Perera PCP      Sensorineural hearing loss of combined sites 03/27/2024     Priority: Medium    Sleep apnea, unspecified 03/27/2024     Priority: Medium    Tinnitus 03/27/2024     Priority: Medium    Carpal tunnel syndrome of left wrist 03/27/2024     Priority: Medium     Mar 16, 2017 Entered By: HELLEN REESE Comment: March 2017 ordered a brace      HTN (hypertension) 03/27/2024     Priority: Medium     Mar 21, 2016 Entered By: HELLEN REESE Comment: Since 2012 has been on Atenolol      Hyperlipidemia LDL goal <100 12/14/2022     Priority: Medium    Atrial fibrillation with RVR (H) 10/25/2021     Priority: Medium    Pain in left wrist 03/10/2021     Priority: Medium    Central sleep apnea syndrome 04/28/2020     Priority: Medium    Obstructive sleep apnea syndrome in adult 04/28/2020     Priority: Medium    REM sleep behavior disorder 04/28/2020     Priority: Medium    Sleep apnea 01/01/2020     Priority: Medium     Mar 29, 2021 Entered By: TAMIKO JESUS Comment: [facility]  at [facility]      CKD (chronic kidney disease) stage 3, GFR 30-59 ml/min (H) 01/08/2018     Priority: Medium    Primary osteoarthritis of right hip 12/05/2017     Priority: Medium    Insomnia, transient 12/02/2013      Priority: Medium    Hypertension goal BP (blood pressure) < 140/90 02/15/2012     Priority: Medium    History of total hip replacement 12/20/2011     Priority: Medium     Overview:    Left      Osteoarthritis of hip 12/20/2011     Priority: Medium     Mar 16, 2017 Entered By: HELLEN REESE Comment: Takes celebrex      CARDIOVASCULAR SCREENING; LDL GOAL LESS THAN 160 10/31/2010     Priority: Medium            Medications (include herbals and vitamins):   Any Plavix use in the last 7 days? No     No current facility-administered medications for this encounter.     Current Outpatient Medications   Medication Sig Dispense Refill    aspirin (ASA) 81 MG EC tablet Take 81 mg by mouth      melatonin 1 MG/ML LIQD liquid Take 5 mg by mouth      Probiotic Product (PROBIOTIC DAILY PO)       acetaminophen (TYLENOL) 500 MG tablet Take 500-1,000 mg by mouth every 6 hours as needed for mild pain      atorvastatin (LIPITOR) 20 MG tablet Take 1 tablet (20 mg) by mouth daily. 90 tablet 4    bisacodyl (DULCOLAX) 5 MG EC tablet Take 2 tablets at 3 pm the day before your procedure. If your procedure is before 11 am, take 2 additional tablets at 11 pm. If your procedure is after 11 am, take 2 additional tablets at 6 am. For additional instructions refer to your colonoscopy prep instructions. 4 tablet 0    lisinopril (ZESTRIL) 5 MG tablet Take 1 tablet (5 mg) by mouth daily. 90 tablet 4    metoprolol succinate ER (TOPROL XL) 50 MG 24 hr tablet Take 1 tablet (50 mg) by mouth daily. 90 tablet 4    polyethylene glycol (GOLYTELY) 236 g suspension The night before the exam at 6 pm drink an 8-ounce glass every 15 minutes until the jug is half empty. If you arrive before 11 AM: Drink the other half of the Golytely jug at 11 PM night before procedure. If you arrive after 11 AM: Drink the other half of the Golytely jug at 6 AM day of procedure. For additional instructions refer to your colonoscopy prep instructions. 4000 mL 0              Allergies:    No Known Allergies  Allergy to Latex? No  Allergy to tape?   No  Intolerances:             Physical Exam:   All vitals have been reviewed  No data found.  No intake/output data recorded.  Lungs:   No increased work of breathing, good air exchange, clear to auscultation bilaterally, no crackles or wheezing     Cardiovascular:   Normal apical impulse, regular rate and rhythm, normal S1 and S2, no S3 or S4, and no murmur noted             Lab / Radiology Results:            Anesthetic risk and/or ASA classification:       Mayo Topete MD

## 2024-11-29 LAB
PATH REPORT.COMMENTS IMP SPEC: NORMAL
PATH REPORT.COMMENTS IMP SPEC: NORMAL
PATH REPORT.FINAL DX SPEC: NORMAL
PATH REPORT.GROSS SPEC: NORMAL
PATH REPORT.MICROSCOPIC SPEC OTHER STN: NORMAL
PATH REPORT.RELEVANT HX SPEC: NORMAL
PHOTO IMAGE: NORMAL

## (undated) DEVICE — SU MONOCRYL 4-0 PS-2 18" UND Y496G

## (undated) DEVICE — DRSG GAUZE 4X4" 3033

## (undated) DEVICE — TUBING SUCTION 6"X3/16" N56A

## (undated) DEVICE — SPONGE KITTNER 31001010

## (undated) DEVICE — SOL NACL 0.9% IRRIG 1000ML BOTTLE 07138-09

## (undated) DEVICE — SU VICRYL 0 CT-1 36" J346H

## (undated) DEVICE — GLOVE PROTEXIS W/NEU-THERA 8.0  2D73TE80

## (undated) DEVICE — ESU GROUND PAD UNIVERSAL W/O CORD

## (undated) DEVICE — DRSG PRIMAPORE 03 1/8X6" 66000318

## (undated) DEVICE — PACK HAND WRIST FOREARM CUSTOM

## (undated) DEVICE — BNDG KLING 3" 2232

## (undated) DEVICE — ENDO FORCEP ENDOJAW BIOPSY 2.8MMX230CM FB-220U

## (undated) DEVICE — SUCTION MANIFOLD NEPTUNE 2 SYS 1 PORT 702-025-000

## (undated) DEVICE — GLOVE BIOGEL PI ULTRATOUCH G SZ 7.5 42175

## (undated) DEVICE — BASIN SET MINOR DISP

## (undated) DEVICE — DRSG XEROFORM 1X8"

## (undated) DEVICE — GLOVE PROTEXIS BLUE W/NEU-THERA 8.5  2D73EB85

## (undated) DEVICE — BNDG COBAN 2"X5YDS UNSTERILE 2082

## (undated) DEVICE — SU PROLENE 2-0 SH 30" 8833H

## (undated) DEVICE — SU PROLENE 2-0 SHDA 36" 8523H

## (undated) DEVICE — STOCKING SLEEVE COMPRESSION CALF MED

## (undated) DEVICE — DRAIN PENROSE 0.25"X18" LATEX FREE GR201

## (undated) DEVICE — SU ETHILON 3-0 PS-2 18" 1669H

## (undated) DEVICE — SYR EAR BULB 2OZ

## (undated) DEVICE — KIT ENDO TURNOVER/PROCEDURE CARRY-ON 101822

## (undated) DEVICE — SOL ADH LIQUID BENZOIN SWAB 0.6ML C1544

## (undated) DEVICE — SUCTION MANIFOLD NEPTUNE 2 SYS 4 PORT 0702-020-000

## (undated) DEVICE — PACK MINOR PROCEDURE CUSTOM

## (undated) DEVICE — DRAPE LAP TRANSVERSE 29421

## (undated) DEVICE — SOL WATER IRRIG 1000ML BOTTLE 2F7114

## (undated) DEVICE — SU DERMABOND ADVANCED .7ML DNX12

## (undated) DEVICE — SU VICRYL 3-0 SH 27" UND J416H

## (undated) RX ORDER — PROPOFOL 10 MG/ML
INJECTION, EMULSION INTRAVENOUS
Status: DISPENSED
Start: 2024-01-10

## (undated) RX ORDER — BUPIVACAINE HYDROCHLORIDE 5 MG/ML
INJECTION, SOLUTION EPIDURAL; INTRACAUDAL
Status: DISPENSED
Start: 2017-10-18

## (undated) RX ORDER — ONDANSETRON 2 MG/ML
INJECTION INTRAMUSCULAR; INTRAVENOUS
Status: DISPENSED
Start: 2024-01-10

## (undated) RX ORDER — DEXAMETHASONE SODIUM PHOSPHATE 10 MG/ML
INJECTION, SOLUTION INTRAMUSCULAR; INTRAVENOUS
Status: DISPENSED
Start: 2024-01-10

## (undated) RX ORDER — EPINEPHRINE 1 MG/ML
INJECTION, SOLUTION INTRAMUSCULAR; SUBCUTANEOUS
Status: DISPENSED
Start: 2024-01-10

## (undated) RX ORDER — PHENYLEPHRINE HYDROCHLORIDE 10 MG/ML
INJECTION INTRAVENOUS
Status: DISPENSED
Start: 2024-01-10

## (undated) RX ORDER — LIDOCAINE HYDROCHLORIDE 10 MG/ML
INJECTION, SOLUTION EPIDURAL; INFILTRATION; INTRACAUDAL; PERINEURAL
Status: DISPENSED
Start: 2024-01-10

## (undated) RX ORDER — BUPIVACAINE HYDROCHLORIDE AND EPINEPHRINE 2.5; 5 MG/ML; UG/ML
INJECTION, SOLUTION EPIDURAL; INFILTRATION; INTRACAUDAL; PERINEURAL
Status: DISPENSED
Start: 2023-12-15

## (undated) RX ORDER — FENTANYL CITRATE 50 UG/ML
INJECTION, SOLUTION INTRAMUSCULAR; INTRAVENOUS
Status: DISPENSED
Start: 2024-01-10

## (undated) RX ORDER — EPHEDRINE SULFATE 50 MG/ML
INJECTION, SOLUTION INTRAMUSCULAR; INTRAVENOUS; SUBCUTANEOUS
Status: DISPENSED
Start: 2024-01-10

## (undated) RX ORDER — LIDOCAINE HYDROCHLORIDE 10 MG/ML
INJECTION, SOLUTION INFILTRATION; PERINEURAL
Status: DISPENSED
Start: 2017-10-18

## (undated) RX ORDER — BUPIVACAINE HYDROCHLORIDE 2.5 MG/ML
INJECTION, SOLUTION EPIDURAL; INFILTRATION; INTRACAUDAL
Status: DISPENSED
Start: 2024-01-10